# Patient Record
Sex: MALE | Race: WHITE | NOT HISPANIC OR LATINO | URBAN - METROPOLITAN AREA
[De-identification: names, ages, dates, MRNs, and addresses within clinical notes are randomized per-mention and may not be internally consistent; named-entity substitution may affect disease eponyms.]

---

## 2018-01-15 ENCOUNTER — EMERGENCY (EMERGENCY)
Facility: HOSPITAL | Age: 48
LOS: 1 days | End: 2018-01-15

## 2018-01-15 DIAGNOSIS — F25.9 SCHIZOAFFECTIVE DISORDER, UNSPECIFIED: ICD-10-CM

## 2018-01-15 DIAGNOSIS — F32.9 MAJOR DEPRESSIVE DISORDER, SINGLE EPISODE, UNSPECIFIED: ICD-10-CM

## 2018-01-15 DIAGNOSIS — E11.9 TYPE 2 DIABETES MELLITUS WITHOUT COMPLICATIONS: ICD-10-CM

## 2018-06-04 ENCOUNTER — INPATIENT (INPATIENT)
Facility: HOSPITAL | Age: 48
LOS: 7 days | Discharge: HOME | End: 2018-06-12
Attending: PSYCHIATRY & NEUROLOGY | Admitting: PSYCHIATRY & NEUROLOGY

## 2018-06-04 VITALS
DIASTOLIC BLOOD PRESSURE: 84 MMHG | WEIGHT: 225.09 LBS | RESPIRATION RATE: 22 BRPM | OXYGEN SATURATION: 98 % | HEIGHT: 71 IN | TEMPERATURE: 98 F | SYSTOLIC BLOOD PRESSURE: 149 MMHG | HEART RATE: 89 BPM

## 2018-06-04 DIAGNOSIS — F22 DELUSIONAL DISORDERS: ICD-10-CM

## 2018-06-04 DIAGNOSIS — F33.9 MAJOR DEPRESSIVE DISORDER, RECURRENT, UNSPECIFIED: ICD-10-CM

## 2018-06-04 DIAGNOSIS — F95.9 TIC DISORDER, UNSPECIFIED: ICD-10-CM

## 2018-06-04 DIAGNOSIS — F84.0 AUTISTIC DISORDER: ICD-10-CM

## 2018-06-04 LAB
ALBUMIN SERPL ELPH-MCNC: 4.3 G/DL — SIGNIFICANT CHANGE UP (ref 3.5–5.2)
ALP SERPL-CCNC: 51 U/L — SIGNIFICANT CHANGE UP (ref 30–115)
ALT FLD-CCNC: 18 U/L — SIGNIFICANT CHANGE UP (ref 0–41)
ANION GAP SERPL CALC-SCNC: 14 MMOL/L — SIGNIFICANT CHANGE UP (ref 7–14)
APAP SERPL-MCNC: <5 UG/ML — LOW (ref 10–30)
AST SERPL-CCNC: 17 U/L — SIGNIFICANT CHANGE UP (ref 0–41)
BASOPHILS # BLD AUTO: 0.05 K/UL — SIGNIFICANT CHANGE UP (ref 0–0.2)
BASOPHILS NFR BLD AUTO: 0.5 % — SIGNIFICANT CHANGE UP (ref 0–1)
BILIRUB SERPL-MCNC: 0.5 MG/DL — SIGNIFICANT CHANGE UP (ref 0.2–1.2)
BUN SERPL-MCNC: 16 MG/DL — SIGNIFICANT CHANGE UP (ref 10–20)
CALCIUM SERPL-MCNC: 9.8 MG/DL — SIGNIFICANT CHANGE UP (ref 8.5–10.1)
CHLORIDE SERPL-SCNC: 104 MMOL/L — SIGNIFICANT CHANGE UP (ref 98–110)
CO2 SERPL-SCNC: 25 MMOL/L — SIGNIFICANT CHANGE UP (ref 17–32)
CREAT SERPL-MCNC: 1.2 MG/DL — SIGNIFICANT CHANGE UP (ref 0.7–1.5)
EOSINOPHIL # BLD AUTO: 0.14 K/UL — SIGNIFICANT CHANGE UP (ref 0–0.7)
EOSINOPHIL NFR BLD AUTO: 1.5 % — SIGNIFICANT CHANGE UP (ref 0–8)
ETHANOL SERPL-MCNC: <10 MG/DL — HIGH
GLUCOSE SERPL-MCNC: 94 MG/DL — SIGNIFICANT CHANGE UP (ref 70–99)
HCT VFR BLD CALC: 41.1 % — LOW (ref 42–52)
HGB BLD-MCNC: 14.4 G/DL — SIGNIFICANT CHANGE UP (ref 14–18)
IMM GRANULOCYTES NFR BLD AUTO: 0.4 % — HIGH (ref 0.1–0.3)
LYMPHOCYTES # BLD AUTO: 2.51 K/UL — SIGNIFICANT CHANGE UP (ref 1.2–3.4)
LYMPHOCYTES # BLD AUTO: 26.1 % — SIGNIFICANT CHANGE UP (ref 20.5–51.1)
MCHC RBC-ENTMCNC: 30.1 PG — SIGNIFICANT CHANGE UP (ref 27–31)
MCHC RBC-ENTMCNC: 35 G/DL — SIGNIFICANT CHANGE UP (ref 32–37)
MCV RBC AUTO: 86 FL — SIGNIFICANT CHANGE UP (ref 80–94)
MONOCYTES # BLD AUTO: 0.75 K/UL — HIGH (ref 0.1–0.6)
MONOCYTES NFR BLD AUTO: 7.8 % — SIGNIFICANT CHANGE UP (ref 1.7–9.3)
NEUTROPHILS # BLD AUTO: 6.11 K/UL — SIGNIFICANT CHANGE UP (ref 1.4–6.5)
NEUTROPHILS NFR BLD AUTO: 63.7 % — SIGNIFICANT CHANGE UP (ref 42.2–75.2)
PLATELET # BLD AUTO: 274 K/UL — SIGNIFICANT CHANGE UP (ref 130–400)
POTASSIUM SERPL-MCNC: 3.9 MMOL/L — SIGNIFICANT CHANGE UP (ref 3.5–5)
POTASSIUM SERPL-SCNC: 3.9 MMOL/L — SIGNIFICANT CHANGE UP (ref 3.5–5)
PROT SERPL-MCNC: 6.8 G/DL — SIGNIFICANT CHANGE UP (ref 6–8)
RBC # BLD: 4.78 M/UL — SIGNIFICANT CHANGE UP (ref 4.7–6.1)
RBC # FLD: 11.8 % — SIGNIFICANT CHANGE UP (ref 11.5–14.5)
SALICYLATES SERPL-MCNC: <0.3 MG/DL — LOW (ref 4–30)
SODIUM SERPL-SCNC: 143 MMOL/L — SIGNIFICANT CHANGE UP (ref 135–146)
WBC # BLD: 9.6 K/UL — SIGNIFICANT CHANGE UP (ref 4.8–10.8)
WBC # FLD AUTO: 9.6 K/UL — SIGNIFICANT CHANGE UP (ref 4.8–10.8)

## 2018-06-04 RX ORDER — LURASIDONE HYDROCHLORIDE 40 MG/1
60 TABLET ORAL DAILY
Qty: 0 | Refills: 0 | Status: DISCONTINUED | OUTPATIENT
Start: 2018-06-04 | End: 2018-06-05

## 2018-06-04 RX ORDER — SIMVASTATIN 20 MG/1
20 TABLET, FILM COATED ORAL AT BEDTIME
Qty: 0 | Refills: 0 | Status: DISCONTINUED | OUTPATIENT
Start: 2018-06-04 | End: 2018-06-12

## 2018-06-04 RX ORDER — FLUOXETINE HCL 10 MG
40 CAPSULE ORAL DAILY
Qty: 0 | Refills: 0 | Status: DISCONTINUED | OUTPATIENT
Start: 2018-06-04 | End: 2018-06-12

## 2018-06-04 RX ORDER — METFORMIN HYDROCHLORIDE 850 MG/1
1000 TABLET ORAL
Qty: 0 | Refills: 0 | Status: DISCONTINUED | OUTPATIENT
Start: 2018-06-04 | End: 2018-06-12

## 2018-06-04 RX ORDER — LAMOTRIGINE 25 MG/1
200 TABLET, ORALLY DISINTEGRATING ORAL EVERY 12 HOURS
Qty: 0 | Refills: 0 | Status: DISCONTINUED | OUTPATIENT
Start: 2018-06-04 | End: 2018-06-12

## 2018-06-04 RX ORDER — CLONAZEPAM 1 MG
2 TABLET ORAL EVERY 12 HOURS
Qty: 0 | Refills: 0 | Status: DISCONTINUED | OUTPATIENT
Start: 2018-06-04 | End: 2018-06-11

## 2018-06-04 RX ADMIN — Medication 1 MILLIGRAM(S): at 16:23

## 2018-06-04 RX ADMIN — LAMOTRIGINE 200 MILLIGRAM(S): 25 TABLET, ORALLY DISINTEGRATING ORAL at 22:40

## 2018-06-04 RX ADMIN — Medication 2 MILLIGRAM(S): at 22:42

## 2018-06-04 RX ADMIN — METFORMIN HYDROCHLORIDE 1000 MILLIGRAM(S): 850 TABLET ORAL at 22:41

## 2018-06-04 RX ADMIN — SIMVASTATIN 20 MILLIGRAM(S): 20 TABLET, FILM COATED ORAL at 22:42

## 2018-06-04 NOTE — ED PROVIDER NOTE - OBJECTIVE STATEMENT
47y M w PMH schizoaffective disorder w paranoia, HTN, HLD, schizophrenia presents with wife for paranoid behavior, worsening for the last few days. Pt states that he believes that people have been watching him. Saw a van down the street from him for a long time. Once it left, he went to the house that it was parked near, and asked why it was there. Homeowner said that a neighbor had a [employer] service call, and the pt called [employer] to confirm and went to the neighbor's house and rang the doorbell. Both 47y M w PMH schizoaffective disorder w paranoia, HTN, HLD, schizophrenia presents with wife for paranoid behavior, worsening for the last few days. Pt states that he believes that people have been watching him. Saw a van down the street from him for a long time. Once it left, he went to the house that it was parked near, and asked why it was there. Homeowner said that a neighbor had a [employer] service call, and the pt called [employer] to confirm and went to the neighbor's house and rang the doorbell. Both sources told the pt that there was no service call, so he get more paranoid.   Today, wife wanted to bring him to ED for evaluation, and wanted a police escort for assistance. Pt got very upset and hit himself in the face with fists approximately 20-30 times while the police were not comfortable intervening.  Pt states he has been having delusions. Aware of his paranoia. +suicidal ideation. No HI.

## 2018-06-04 NOTE — ED BEHAVIORAL HEALTH ASSESSMENT NOTE - DETAILS
2 daughters age 16 and 19 haldol, abilify, geodon "bad reaction", risperdal worked x 8 yrs then "lost effectiveness" maternal uncle suicided, also w schizophrenia, 2 sisters with "mental disorders" requiring IPP na

## 2018-06-04 NOTE — ED PROVIDER NOTE - ATTENDING CONTRIBUTION TO CARE
48 y/o M comes to the ER PMH of multiple psych conditions, paranoid personality and delusional disorder. Last one last Sunday. Pt sees a psychotherapist 2 x a week. Pt also states that he punched himself in the right cheek 30 times in a rage and is requesting an XR.    Patient has been having delusions and ringing on peoples doorbells and thinking parked cars are people up to no good.  Wife is concerned patient has had a psychotic break and she cannot manage him. Patient placed on a 1:1, labs sent.  Patient medically cleared and evaluated by Psych, who request admission to Psych.

## 2018-06-04 NOTE — ED BEHAVIORAL HEALTH ASSESSMENT NOTE - HPI (INCLUDE ILLNESS QUALITY, SEVERITY, DURATION, TIMING, CONTEXT, MODIFYING FACTORS, ASSOCIATED SIGNS AND SYMPTOMS)
56 yo MWM w ho past dx of paranoid personality do, schizoaffective disorder, delusional disorder presents with worsening of paranoid symptoms. Pt reports having been diagnosed with schizoaffective disorder in June 2007 after presenting with belief that he was being watched by neighbors for "government reasons" and being fired from job and pt mentioned email from boss telling him that he "feared for my safety and the safety of my employees and my emotional well being". Pt had a 10 day stay in [facility] 10 for psych reasons. Pt is compliant with treatment of seeing Dr Kulkarni every few weeks and is on fluoxetine 20 mg qd, lamotragine 200 mg po q12h, clonazepam 2 mg po q12h, latuda 60 mg po qd and has been on this regimen for several months with most recent change being an inc in benzo as he was orig rx 2 mg total for day but was taking 3-5 mg per day and dosage was inc and pt has been taking appropriately as rx since then. Pt has been experiencing decompensation in recent days with worry that he was being watched by cars, worrying that the government is watching him to find out if he is driving to give this evidence to prosecutors who would pass that on to a  which would get him in legal trouble. Pt has been on SSD for mental illness and at the time of SSD assessment, he reported difficulty with driving and since then periodically worries that he is being observed that he is able to drive well. In recent days, pt has been concerned about cars on his block. Yesterday, he saw a car parked in front of neighbors home and thought it might be from [employer], so called [employer] to find out if they had sent a car to [facility]. Pt then began ringing doorbells of neighbors to ask them about cars in area. He has also called 2 of his 4 friends to ask 3 questions "are you my friend? are you watching me? are you recording me?" repeatedly. He reports his friends denied this and wife took phone away before he could call a 3rd person. Pt reports sadness as one friend told him "I can't have you around my wife and kids anymore" and he has had this friend for 30 yrs. Pt has also felt that he is being recorded. 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 Pt waited in car for her during her sessions. At each session, he became increasingly worried about being watched and at the last session she attended, he was screaming while in car. Wife was going to take him to ER, but was worried about his agitation and so called [place] for [place]. Pt reports he didn't want an escort, so when they arrived, he locked himself within car and began hitting himself in the face repeatedly, pt estimates 30 times (pt does have bruised and swollen right cheek) and yelling "I don't want an escort". [place] did not intervene until EMS responded and then was able to start an IV and transport him to ER. Pt reported that while in car, he had urge to take a large quantity of clonazepam to hurt himself "my doctor told me if I take too much, it can stop my heart". Pt did not have any meds on him. Pt reports that currently he has "zero depression" but states this was his original diagnosis for SSD. Pt pleasant and cooperative, good historian but exam noted for flat tone of voice and pt repeating facts in a robotic fashion multiple times during assessment.

## 2018-06-04 NOTE — ED BEHAVIORAL HEALTH ASSESSMENT NOTE - RISK ASSESSMENT
Pt felt to be an acute risk to self/others, given thoughts of self harm, nonsuicidal self injurious behavior and active psychosis

## 2018-06-04 NOTE — PATIENT PROFILE BEHAVIORAL HEALTH - NSBHPSYTREAT_PSY_A_CORE
partial hospitalization/family history of psych problems/pt sees a therapist twice a week and has a private psychiatrist he sees. Pt has family with psych disorders and prior IPP admissions/private therapist

## 2018-06-04 NOTE — ED PROVIDER NOTE - NS ED ROS FT
Constitutional: No fever or chills. Normal appetite. No unintended weight loss.   Eyes: No vision changes.  ENT: No hearing changes. No ear pain. No sore throat.  Neck: No neck pain or stiffness.  Cardiovascular: No chest pain, palpitations, or edema.  Pulmonary: No cough or SOB. No hemoptysis.  Abdominal: No abdominal pain, nausea, vomiting, or diarrhea.   : No dysuria or frequency. No hematuria.   Neuro: No headache, syncope, or dizziness.  MS: No back pain. No calf pain/swelling.  Psych: +suicidal ideations

## 2018-06-04 NOTE — ED BEHAVIORAL HEALTH ASSESSMENT NOTE - SUMMARY
54 yo MWM w ho ishaant diagnoses, currently presenting with delusion of being watched by government for evidence that he is fraudulently receiving his SSD income which would result in legal issues for him. Pt currently with flat affect, tells history with robotic, repetitive fashion but otherwise cooperative. Pt agrees to IPP at this time with ER hold. Process of ER hold explained to pt and pt in agreement.

## 2018-06-04 NOTE — ED PROVIDER NOTE - CARE PLAN
Principal Discharge DX:	Paranoid schizophrenia  Secondary Diagnosis:	Aggressive behavior  Secondary Diagnosis:	Delusions

## 2018-06-04 NOTE — ED PROVIDER NOTE - PHYSICAL EXAMINATION
Constitutional: Well developed, well nourished. NAD. Good general hygiene  Head: R upper cheek swelling without bleeding or abrasions.   Eyes: PERRLA. EOMI without discomfort.   ENT: No nasal discharge. Mucous membranes moist.  Neck: Supple. Painless ROM.  Cardiovascular: Regular rhythm. Regular rate. Normal S1 and S2. No murmurs. 2+ pulses in all extremities.   Pulmonary: Normal respiratory rate and effort. Lungs clear to auscultation bilaterally. No wheezing, rales, or rhonchi. Bilateral, equal lung expansion.   Abdominal: Soft. Nondistended. Nontender. No rebound or guarding.   Extremities. Pelvis stable. No lower extremity edema.  Skin: No rashes.   Neuro: AAOx3. No focal neurological deficits.  Psych: Agitated. Pressured speech. Anxious.

## 2018-06-04 NOTE — PATIENT PROFILE BEHAVIORAL HEALTH - REASON FOR ADMISSION
pt realized he was having delusional thinking over the last several weeks and has been recently getting worse. Pt states he feels that the government is tapping his phone and listening to his conversations, car in the neighborhood have been following him. Pt admitted to ringing neighborhood doorbells questioning people about deliveries made to their homes. Pt sees a therapist twice a week and regularly sees a psychiatrist. Pt expressed these concerns to his therapist and psychiatrist. Pt is compliant with medications at home. Pt went to work with wife this morning and got increasingly paranoid throughout the course of the day. Wife wanted a police escort to take pt to ER. Pt did not want the police escort, locked himself in the care and punched himself 30 times in the head.

## 2018-06-04 NOTE — ED PROVIDER NOTE - MEDICAL DECISION MAKING DETAILS
I personally evaluated the patient. I reviewed the Resident’s or Physician Assistant’s note (as assigned above), and agree with the findings and plan except as documented in my note. 46yo M comes to the ER PMH of multiple psych conditions, paranoid personality and delusional disorder. Last one last Sunday. Pt sees a psychotherapist 2 x a week. Pt also states that he punched himself in the right cheek 30 times in a rage and is requesting an XR.

## 2018-06-05 DIAGNOSIS — E78.00 PURE HYPERCHOLESTEROLEMIA, UNSPECIFIED: ICD-10-CM

## 2018-06-05 DIAGNOSIS — Z98.890 OTHER SPECIFIED POSTPROCEDURAL STATES: Chronic | ICD-10-CM

## 2018-06-05 DIAGNOSIS — F39 UNSPECIFIED MOOD [AFFECTIVE] DISORDER: ICD-10-CM

## 2018-06-05 DIAGNOSIS — E11.9 TYPE 2 DIABETES MELLITUS WITHOUT COMPLICATIONS: ICD-10-CM

## 2018-06-05 RX ORDER — LURASIDONE HYDROCHLORIDE 40 MG/1
80 TABLET ORAL DAILY
Qty: 0 | Refills: 0 | Status: DISCONTINUED | OUTPATIENT
Start: 2018-06-05 | End: 2018-06-05

## 2018-06-05 RX ORDER — LURASIDONE HYDROCHLORIDE 40 MG/1
80 TABLET ORAL DAILY
Qty: 0 | Refills: 0 | Status: DISCONTINUED | OUTPATIENT
Start: 2018-06-06 | End: 2018-06-07

## 2018-06-05 RX ORDER — HYDROXYZINE HCL 10 MG
50 TABLET ORAL EVERY 6 HOURS
Qty: 0 | Refills: 0 | Status: DISCONTINUED | OUTPATIENT
Start: 2018-06-05 | End: 2018-06-12

## 2018-06-05 RX ADMIN — Medication 40 MILLIGRAM(S): at 11:12

## 2018-06-05 RX ADMIN — LURASIDONE HYDROCHLORIDE 60 MILLIGRAM(S): 40 TABLET ORAL at 11:12

## 2018-06-05 RX ADMIN — LAMOTRIGINE 200 MILLIGRAM(S): 25 TABLET, ORALLY DISINTEGRATING ORAL at 08:33

## 2018-06-05 RX ADMIN — Medication 2 MILLIGRAM(S): at 20:59

## 2018-06-05 RX ADMIN — Medication 2 MILLIGRAM(S): at 08:32

## 2018-06-05 RX ADMIN — METFORMIN HYDROCHLORIDE 1000 MILLIGRAM(S): 850 TABLET ORAL at 08:33

## 2018-06-05 RX ADMIN — METFORMIN HYDROCHLORIDE 1000 MILLIGRAM(S): 850 TABLET ORAL at 20:44

## 2018-06-05 RX ADMIN — SIMVASTATIN 20 MILLIGRAM(S): 20 TABLET, FILM COATED ORAL at 20:44

## 2018-06-05 RX ADMIN — LAMOTRIGINE 200 MILLIGRAM(S): 25 TABLET, ORALLY DISINTEGRATING ORAL at 20:44

## 2018-06-05 NOTE — PROGRESS NOTE ADULT - SUBJECTIVE AND OBJECTIVE BOX
Pt was seen by Dr. Godwin (PGY2)- assessment and plan discussed with resident and in agreement.    Pt was evaluated and assessed independently.     States he came in because of worsening paranoia. Reports that he had thoughts of overdosing on his klonopin yesterday which scared him and caused him to ask his wife to take him to the ER. It appears the thought was more intrusive then a suicidal ideation with intent. States he has been seeing his therapist (2 times per week- Dr. Meza) and Dr. Kulkarni as scheduled. Most recently on Monday his klonopin was change to 2mg q12 from 1mg q6. States his Latuda was last increased 1 month ago.   His Prozac was increased on admission. Pt states he feels better now that he is in the hospital and denies any active suicidal ideation. States he continues to believe the government is following him and has relief with reassurance but states that after therapy his symptoms usually subside for 1-3 days but then return.

## 2018-06-05 NOTE — H&P ADULT - NSHPPHYSICALEXAM_GEN_ALL_CORE
Vital Signs Last 24 Hrs  T(F): 97.6 (05 Jun 2018 09:45), Max: 98.6 (04 Jun 2018 18:43)  HR: 67 (05 Jun 2018 09:45) (63 - 89)  BP: 135/64 (05 Jun 2018 09:45) (119/65 - 149/84)  RR: 20 (05 Jun 2018 10:30) (16 - 22)  SpO2: 96% (05 Jun 2018 10:30) (96% - 99%)    PHYSICAL EXAM:      Constitutional: alert, calm, speech slightly rapid and pressured  Eyes: PERRLA, EOM intact  ENMT: no sore throat  Neck: no tenderness  Back: no spinal tenderness  Respiratory: cta b/l  Cardiovascular: s1 s2 rrr  Gastrointestinal: soft nt  nd + bs no rebound or guarding  Genitourinary: no cva tenderness  Extremities: normal rom, no edema, calf tenderness  Vascular: no cyanosis   Neurological:no focal deficits  Skin: no rash  Lymph Nodes: no lymphadenopathy  Musculoskeletal: no joint swelling

## 2018-06-05 NOTE — H&P ADULT - HISTORY OF PRESENT ILLNESS
46yo M with hx of mental illness compliant with psych meds whom was having increasing, paranoia, delusions at home and became increasingly agitated, screaming frequently, thoughts of hurting himself and even SI. Patient punched himself in head 30 times in a rage episode. Patient brought to ED by police.  Patient denies any alcohol or substance abuse.

## 2018-06-05 NOTE — PROGRESS NOTE ADULT - PROBLEM SELECTOR PLAN 1
-Pt will be changed to enhanced supervision for safety and 1:1 will be discontinued  -Will increase Latuda to 80mg q24  -Will follow up with Dr. Kulkarni and wife for collateral  -Ativan 1mg q6 prn for severe agitation  -Vistaril 50mg q6 prn for anxiety or agitation

## 2018-06-05 NOTE — CHART NOTE - NSCHARTNOTEFT_GEN_A_CORE
Social work admit note:    PT is a 55 year old  white male brought to the ED by [place]/[place] for mental health evaluation due to paranoia, agitation, self-harm behavior and anxiety.      PT has a previous diagnosis of paranoid personality disorder, schizoaffective disorder, delusional disorder presents with worsening of paranoid symptoms. Per ED notes. PT reports having been diagnosed with schizoaffective disorder in June 2007 after presenting with belief that he was being watched by neighbors for "government reasons" and being fired from job and pt mentioned email from boss telling him that he "feared for my safety and the safety of my employees and my emotional well being". PT has been experiencing decompensation in recent days with worry that he was being watched by cars, worrying that the government is watching. Last admission @ [facility] IPP1/2019 with similar presentation.    PT currently receiving out patient treatment bu Dr. Kulkarni, [facility]. PT is currently receiving SSDI, he lives with his wife and minor children.    PT denies any substance abuse or legal issues. See social work assessment for further information.

## 2018-06-05 NOTE — H&P ADULT - NSHPLABSRESULTS_GEN_ALL_CORE
14.4   9.60  )-----------( 274      ( 04 Jun 2018 17:14 )             41.1   06-04    143  |  104  |  16  ----------------------------<  94  3.9   |  25  |  1.2    Ca    9.8      04 Jun 2018 17:14    TPro  6.8  /  Alb  4.3  /  TBili  0.5  /  DBili  x   /  AST  17  /  ALT  18  /  AlkPhos  51  06-04    < from: CT Maxillofacial No Cont (06.04.18 @ 20:03) >    IMPRESSION:    No evidence of acute facial fracture.    < end of copied text >

## 2018-06-05 NOTE — H&P ADULT - NSHPREVIEWOFSYSTEMS_GEN_ALL_CORE
General:	no fever, weight loss,  chills  Skin: no rash, ulcers  Ophthalmologic: no visual changes  ENMT:	no sore throat  Respiratory and Thorax: no cough, wheeze,  sob  Cardiovascular:	no chest pain, palpitations, dizziness  Gastrointestinal:	no nausea, vomiting, diarrhea, abd pain  Genitourinary:	no dysuria, hematuria  Musculoskeletal:	no joint pains  Neurological:	 no speech disturbance, focal weakness, numbness

## 2018-06-06 DIAGNOSIS — E11.9 TYPE 2 DIABETES MELLITUS WITHOUT COMPLICATIONS: ICD-10-CM

## 2018-06-06 DIAGNOSIS — F25.1 SCHIZOAFFECTIVE DISORDER, DEPRESSIVE TYPE: ICD-10-CM

## 2018-06-06 LAB
CHOLEST SERPL-MCNC: 125 MG/DL — SIGNIFICANT CHANGE UP (ref 100–200)
ESTIMATED AVERAGE GLUCOSE: 126 MG/DL — HIGH (ref 68–114)
HBA1C BLD-MCNC: 6 % — HIGH (ref 4–5.6)
HDLC SERPL-MCNC: 39 MG/DL — LOW (ref 40–125)
LIPID PNL WITH DIRECT LDL SERPL: 66 MG/DL — SIGNIFICANT CHANGE UP (ref 4–129)
TOTAL CHOLESTEROL/HDL RATIO MEASUREMENT: 3.2 RATIO — LOW (ref 4–5.5)
TRIGL SERPL-MCNC: 162 MG/DL — HIGH (ref 10–149)

## 2018-06-06 RX ADMIN — Medication 1 MILLIGRAM(S): at 03:34

## 2018-06-06 RX ADMIN — Medication 50 MILLIGRAM(S): at 04:28

## 2018-06-06 RX ADMIN — METFORMIN HYDROCHLORIDE 1000 MILLIGRAM(S): 850 TABLET ORAL at 20:01

## 2018-06-06 RX ADMIN — LAMOTRIGINE 200 MILLIGRAM(S): 25 TABLET, ORALLY DISINTEGRATING ORAL at 08:33

## 2018-06-06 RX ADMIN — LURASIDONE HYDROCHLORIDE 80 MILLIGRAM(S): 40 TABLET ORAL at 08:34

## 2018-06-06 RX ADMIN — Medication 40 MILLIGRAM(S): at 08:33

## 2018-06-06 RX ADMIN — LAMOTRIGINE 200 MILLIGRAM(S): 25 TABLET, ORALLY DISINTEGRATING ORAL at 20:01

## 2018-06-06 RX ADMIN — METFORMIN HYDROCHLORIDE 1000 MILLIGRAM(S): 850 TABLET ORAL at 08:33

## 2018-06-06 RX ADMIN — Medication 2 MILLIGRAM(S): at 08:33

## 2018-06-06 RX ADMIN — Medication 2 MILLIGRAM(S): at 20:01

## 2018-06-06 RX ADMIN — SIMVASTATIN 20 MILLIGRAM(S): 20 TABLET, FILM COATED ORAL at 20:01

## 2018-06-06 NOTE — PROGRESS NOTE BEHAVIORAL HEALTH - NSBHCHARTREVIEWLAB_PSY_A_CORE FT
CBC Full  -  ( 04 Jun 2018 17:14 )  WBC Count : 9.60 K/uL  Hemoglobin : 14.4 g/dL  Hematocrit : 41.1 %  Platelet Count - Automated : 274 K/uL  Mean Cell Volume : 86.0 fL  Mean Cell Hemoglobin : 30.1 pg  Mean Cell Hemoglobin Concentration : 35.0 g/dL  Auto Neutrophil # : 6.11 K/uL  Auto Lymphocyte # : 2.51 K/uL  Auto Monocyte # : 0.75 K/uL  Auto Eosinophil # : 0.14 K/uL  Auto Basophil # : 0.05 K/uL  Auto Neutrophil % : 63.7 %  Auto Lymphocyte % : 26.1 %  Auto Monocyte % : 7.8 %  Auto Eosinophil % : 1.5 %  Auto Basophil % : 0.5 %
CBC Full  -  ( 04 Jun 2018 17:14 )  WBC Count : 9.60 K/uL  Hemoglobin : 14.4 g/dL  Hematocrit : 41.1 %  Platelet Count - Automated : 274 K/uL  Mean Cell Volume : 86.0 fL  Mean Cell Hemoglobin : 30.1 pg  Mean Cell Hemoglobin Concentration : 35.0 g/dL  Auto Neutrophil # : 6.11 K/uL  Auto Lymphocyte # : 2.51 K/uL  Auto Monocyte # : 0.75 K/uL  Auto Eosinophil # : 0.14 K/uL  Auto Basophil # : 0.05 K/uL  Auto Neutrophil % : 63.7 %  Auto Lymphocyte % : 26.1 %  Auto Monocyte % : 7.8 %  Auto Eosinophil % : 1.5 %  Auto Basophil % : 0.5 %

## 2018-06-06 NOTE — CONSULT NOTE ADULT - SUBJECTIVE AND OBJECTIVE BOX
QUINN CAMPOS  47y  Male      Patient is a 47y old  Male who presents with a chief complaint of pt realized he was having delusional thinking over the last several weeks and has been recently getting worse. Pt states he feels that the government is tapping his phone and listening to his conversations, car in the neighborhood have been following him. Pt admitted to ringing neighborhood doorbells questioning people about deliveries made to their homes. Pt sees a therapist twice a week and regularly sees a psychiatrist. Pt expressed these concerns to his therapist and psychiatrist. Pt is compliant with medications at home. Pt went to work with wife this morning and got increasingly paranoid throughout the course of the day. Wife wanted a police escort to take pt to ER. Pt did not want the police escort, locked himself in the care and punched himself 30 times in the head. (04 Jun 2018 23:36)    HPI:  48yo M with hx of mental illness compliant with psych meds whom was having increasing, paranoia, delusions at home and became increasingly agitated, screaming frequently, thoughts of hurting himself and even SI. Patient punched himself in head 30 times in a rage episode. Patient brought to ED by police.  Patient denies any alcohol or substance abuse. (05 Jun 2018 10:46)    pt wellknown to me as take care of himmecially in office  last seen few months ago  lives hjoem with wife adn children    INTERVAL HPI/OVERNIGHT EVENTS:  HEALTH ISSUES - PROBLEM Dx:  Mood disorder: Mood disorder  Type 2 diabetes mellitus without complication, without long-term current use of insulin: Type 2 diabetes mellitus without complication, without long-term current use of insulin  High blood cholesterol: High blood cholesterol  Delusional disorder  Major depression, recurrent, chronic  Autism spectrum disorder  Tic like phenomenon        PAST MEDICAL & SURGICAL HISTORY:  Paranoid schizophrenia  High blood cholesterol  Diabetes mellitus, type 2  S/P knee surgery    FAMILY HISTORY:  No pertinent family history in first degree relatives    clonazePAM Tablet 2 milliGRAM(s) Oral every 12 hours  FLUoxetine 40 milliGRAM(s) Oral daily  hydrOXYzine hydrochloride 50 milliGRAM(s) Oral every 6 hours PRN  lamoTRIgine 200 milliGRAM(s) Oral every 12 hours  LORazepam     Tablet 1 milliGRAM(s) Oral every 6 hours PRN  lurasidone 80 milliGRAM(s) Oral daily  metFORMIN 1000 milliGRAM(s) Oral two times a day  simvastatin 20 milliGRAM(s) Oral at bedtime      REVIEW OF SYSTEMS:  CONSTITUTIONAL: No fever, weight loss, or fatigue  EYES: No eye pain, visual disturbances, or discharge  ENMT:  No difficulty hearing, tinnitus, vertigo; No sinus or throat pain  NECK: No pain or stiffness  BREASTS: No pain, masses, or nipple discharge  RESPIRATORY: No cough, wheezing, chills or hemoptysis; No shortness of breath  CARDIOVASCULAR: No chest pain, palpitations, dizziness, or leg swelling  GASTROINTESTINAL: No abdominal or epigastric pain. No nausea, vomiting, or hematemesis; No diarrhea or constipation. No melena or hematochezia.  GENITOURINARY: No dysuria, frequency, hematuria, or incontinence  NEUROLOGICAL: No headaches, memory loss, loss of strength, numbness, or tremors  SKIN: No itching, burning, rashes, or lesions   LYMPH NODES: No enlarged glands  ENDOCRINE: No heat or cold intolerance; No hair loss  MUSCULOSKELETAL: No joint pain or swelling; No muscle, back, or extremity pain  PSYCHIATRIC: as per hpi and previous psych history  HEME/LYMPH: No easy bruising, or bleeding gums  ALLERY AND IMMUNOLOGIC: No hives or eczema    T(C): 36.3 (06-06-18 @ 06:20), Max: 36.4 (06-05-18 @ 09:45)  HR: 65 (06-06-18 @ 06:20) (65 - 80)  BP: 108/63 (06-06-18 @ 06:20) (108/63 - 135/64)  RR: 16 (06-06-18 @ 06:20) (16 - 20)  SpO2: 96% (06-05-18 @ 10:30) (96% - 99%)  Wt(kg): --Vital Signs Last 24 Hrs  T(C): 36.3 (06 Jun 2018 06:20), Max: 36.4 (05 Jun 2018 09:45)  T(F): 97.3 (06 Jun 2018 06:20), Max: 97.6 (05 Jun 2018 09:45)  HR: 65 (06 Jun 2018 06:20) (65 - 80)  BP: 108/63 (06 Jun 2018 06:20) (108/63 - 135/64)  BP(mean): --  RR: 16 (06 Jun 2018 06:20) (16 - 20)  SpO2: 96% (05 Jun 2018 10:30) (96% - 99%)    PHYSICAL EXAM:  GENERAL: NAD, well-groomed, well-developed  HEAD:  Atraumatic, Normocephalic  EYES: EOMI, PERRLA, conjunctiva and sclera clear  ENMT: No tonsillar erythema, exudates, or enlargement; Moist mucous membranes, Good dentition, No lesions  NECK: Supple, No JVD, Normal thyroid  NERVOUS SYSTEM:  Alert & Oriented X3, Good concentration; Motor Strength 5/5 B/L upper and lower extremities; DTRs 2+ intact and symmetric  CHEST/LUNG: Clear to percussion bilaterally; No rales, rhonchi, wheezing, or rubs  HEART: Regular rate and rhythm; No murmurs, rubs, or gallops  ABDOMEN: Soft, Nontender, Nondistended; Bowel sounds present  EXTREMITIES:  2+ Peripheral Pulses, No clubbing, cyanosis, or edema  LYMPH: No lymphadenopathy noted  SKIN: No rashes or lesions  Neuro: alert  no focal deficits    Consultant(s) Notes Reviewed:  [x ] YES  [ ] NO  Care Discussed with Consultants/Other Providers [ x] YES  [ ] NO    LABS:                        14.4   9.60  )-----------( 274      ( 04 Jun 2018 17:14 )             41.1     06-04    143  |  104  |  16  ----------------------------<  94  3.9   |  25  |  1.2    Ca    9.8      04 Jun 2018 17:14    TPro  6.8  /  Alb  4.3  /  TBili  0.5  /  DBili  x   /  AST  17  /  ALT  18  /  AlkPhos  51  06-04        CAPILLARY BLOOD GLUCOSE                RADIOLOGY & ADDITIONAL TESTS:    Imaging Personally Reviewed:  [ ] YES  [ ] NO

## 2018-06-07 DIAGNOSIS — F20.9 SCHIZOPHRENIA, UNSPECIFIED: ICD-10-CM

## 2018-06-07 LAB — TSH SERPL-MCNC: 1.83 UIU/ML — SIGNIFICANT CHANGE UP (ref 0.27–4.2)

## 2018-06-07 RX ORDER — LURASIDONE HYDROCHLORIDE 40 MG/1
80 TABLET ORAL DAILY
Qty: 0 | Refills: 0 | Status: DISCONTINUED | OUTPATIENT
Start: 2018-06-07 | End: 2018-06-12

## 2018-06-07 RX ADMIN — METFORMIN HYDROCHLORIDE 1000 MILLIGRAM(S): 850 TABLET ORAL at 08:03

## 2018-06-07 RX ADMIN — Medication 40 MILLIGRAM(S): at 08:02

## 2018-06-07 RX ADMIN — LAMOTRIGINE 200 MILLIGRAM(S): 25 TABLET, ORALLY DISINTEGRATING ORAL at 20:49

## 2018-06-07 RX ADMIN — Medication 2 MILLIGRAM(S): at 20:49

## 2018-06-07 RX ADMIN — SIMVASTATIN 20 MILLIGRAM(S): 20 TABLET, FILM COATED ORAL at 21:05

## 2018-06-07 RX ADMIN — Medication 2 MILLIGRAM(S): at 08:02

## 2018-06-07 RX ADMIN — Medication 50 MILLIGRAM(S): at 04:02

## 2018-06-07 RX ADMIN — METFORMIN HYDROCHLORIDE 1000 MILLIGRAM(S): 850 TABLET ORAL at 20:49

## 2018-06-07 RX ADMIN — LAMOTRIGINE 200 MILLIGRAM(S): 25 TABLET, ORALLY DISINTEGRATING ORAL at 08:03

## 2018-06-07 RX ADMIN — LURASIDONE HYDROCHLORIDE 80 MILLIGRAM(S): 40 TABLET ORAL at 08:03

## 2018-06-07 NOTE — PROGRESS NOTE ADULT - ASSESSMENT
Pt is 47 y.o. male with schizoaffective disorder presented to hospital with worsening paranoid delusions, worsening depression with suicidal ideation, and decreased self care. Pt is stable on the unit, denying active suicidal ideation at this time. Pt is tolerating increase in Latuda and Prozac. Will consider decreasing klonopin dose.     -Will increase Latuda to 100mg q24 tomorrow  -Continue Lamictal 200mg q12  -Continue Prozac 40mg q24  -Continue Klonopin 2mg q12 at this time  -Will discontinue ativan prn as pt is benefiting from vistaril prn  -Continue Vistaril 50mg q 6 prn Pt is 47 y.o. male with schizoaffective disorder presented to hospital with worsening paranoid delusions, worsening depression with suicidal ideation, and decreased self care. Pt is stable on the unit, denying active suicidal ideation at this time. Pt is tolerating increase in Latuda and Prozac. Will consider decreasing klonopin dose.

## 2018-06-07 NOTE — PROGRESS NOTE ADULT - PROBLEM SELECTOR PLAN 1
-Will increase Latuda to 100mg q24 tomorrow  -Continue Lamictal 200mg q12  -Continue Prozac 40mg q24  -Continue Klonopin 2mg q12 at this time  -Will discontinue ativan prn as pt is benefiting from vistaril prn  -Continue Vistaril 50mg q 6 prn -Continue Latuda 80mg q24  -Continue Lamictal 200mg q12  -Continue Prozac 40mg q24  -Continue Klonopin 2mg q12 at this time  -Will discontinue ativan prn as pt is benefiting from vistaril prn  -Continue Vistaril 50mg q 6 prn

## 2018-06-07 NOTE — PROGRESS NOTE ADULT - SUBJECTIVE AND OBJECTIVE BOX
Pt seen and examined independent of PGY2 Dr. Godwin    Pt reported he is doing "very good" and states he did wake up at 4 am but was able to get back to sleep with a vistaril.  States he feels his paranoid thoughts are getting better and attributes this to Latuda. Denies any side effects . States his wife and in-laws visiting helped his mood.   Pt is interested in attending groups while on the unit.  Discussed Clozaril risk and benefits with patient and pt feels he is improving with increase in Latuda and would like to give it a trail.   Pt was asking about individual therapy while on the unit despite being told yesterday that is not available.    On assessment pt is alert and awake, orientated x 3. Pt is well groomed and has fair eye contact. Pt is cooperative during assessment. Speech is reg rate, rhythm, volume, tone. Pts mood is "better", affect is anxious and constricted. Thought process is linear but overinclusive and perseverating. + Paranoid Delusions. Denies SI/HI/AVH. Insight is fair, judgement is good.    Pt is 47 y.o. male with schizoaffective disorder presented to hospital with worsening paranoid delusions, worsening depression with suicidal ideation, and decreased self care. Pt is stable on the unit, denying active suicidal ideation at this time. Pt is tolerating increase in Latuda and Prozac. Will consider decreasing klonopin dose.     -Will increase Latuda to 100mg q24 tomorrow  -Continue Lamictal 200mg q12  -Continue Prozac 40mg q24  -Continue Klonopin 2mg q12 Pt seen and examined independent of PGY2 Dr. Godwin    Pt reported he is doing "very good" and states he did wake up at 4 am but was able to get back to sleep with a vistaril.  States he feels his paranoid thoughts are getting better and attributes this to Latuda. Denies any side effects . States his wife and in-laws visiting helped his mood.   Pt is interested in attending groups while on the unit.  Discussed Clozaril risk and benefits with patient and pt feels he is improving with increase in Latuda and would like to give it a trail.   Pt was asking about individual therapy while on the unit despite being told yesterday that is not available.    On assessment pt is alert and awake, orientated x 3. Pt is well groomed and has fair eye contact. Pt is cooperative during assessment. Speech is reg rate, rhythm, volume, tone. Pts mood is "better", affect is anxious and constricted. Thought process is linear but overinclusive and perseverating. + Paranoid Delusions. Denies SI/HI/AVH. Insight is fair, judgement is good.

## 2018-06-07 NOTE — PROGRESS NOTE BEHAVIORAL HEALTH - NSBHADDHXPSYCHFT_PSY_A_CORE
patient reports that he had thoughts that his emails were being hacked, and his bosses firing him, however he was coping at work. In 2007, he decompensated and had to stop working

## 2018-06-08 RX ADMIN — LAMOTRIGINE 200 MILLIGRAM(S): 25 TABLET, ORALLY DISINTEGRATING ORAL at 20:37

## 2018-06-08 RX ADMIN — LURASIDONE HYDROCHLORIDE 80 MILLIGRAM(S): 40 TABLET ORAL at 08:06

## 2018-06-08 RX ADMIN — Medication 40 MILLIGRAM(S): at 12:15

## 2018-06-08 RX ADMIN — METFORMIN HYDROCHLORIDE 1000 MILLIGRAM(S): 850 TABLET ORAL at 08:05

## 2018-06-08 RX ADMIN — LAMOTRIGINE 200 MILLIGRAM(S): 25 TABLET, ORALLY DISINTEGRATING ORAL at 08:32

## 2018-06-08 RX ADMIN — METFORMIN HYDROCHLORIDE 1000 MILLIGRAM(S): 850 TABLET ORAL at 20:37

## 2018-06-08 RX ADMIN — Medication 2 MILLIGRAM(S): at 08:03

## 2018-06-08 RX ADMIN — Medication 50 MILLIGRAM(S): at 18:13

## 2018-06-08 RX ADMIN — SIMVASTATIN 20 MILLIGRAM(S): 20 TABLET, FILM COATED ORAL at 21:09

## 2018-06-08 RX ADMIN — Medication 2 MILLIGRAM(S): at 20:38

## 2018-06-08 NOTE — PROGRESS NOTE BEHAVIORAL HEALTH - SUMMARY
47 Y male with schizoaffective disorder admitted for decompensated psychosis and poor self care. Since pt's Prozac and Latuda were increased, pt has improved grooming, reports decreased paranoia, and visible on the unit more, and less anxious.   Family meeting on Monday at 2pm
47 Y male with schizoaffective disorder admitted for decompensated psychosis. Patient presently has paranoid ideations which interfere with his functioning and worsening mood presenting suicidal ideation.  Pt will be tried on increase in Latuda and Prozac. If pts symptoms continue to not respond, will consider Clozaril after discussing risk and benefits with patient.
47 Y male with schizoaffective disorder admitted for decompensated psychosis. Patient presently has paranoid ideations which interfere with his functioning and make him a danger to self, requiring IPP hospitalization.    Plan:  1. discontinue 1:1  2. increase latuda to 80mg  3. prn ativan 1mg and vistaril 50 for agitation
47 Y male with schizoaffective disorder admitted for decompensated psychosis. Patient presently has paranoid ideations which interfere with his functioning and worsening mood presenting suicidal ideation.  Pt will be tried on increase in Latuda and Prozac. If pts symptoms continue to not respond, will consider Clozaril after discussing risk and benefits with patient.

## 2018-06-09 RX ADMIN — METFORMIN HYDROCHLORIDE 1000 MILLIGRAM(S): 850 TABLET ORAL at 08:11

## 2018-06-09 RX ADMIN — SIMVASTATIN 20 MILLIGRAM(S): 20 TABLET, FILM COATED ORAL at 20:37

## 2018-06-09 RX ADMIN — LURASIDONE HYDROCHLORIDE 80 MILLIGRAM(S): 40 TABLET ORAL at 08:13

## 2018-06-09 RX ADMIN — Medication 2 MILLIGRAM(S): at 08:14

## 2018-06-09 RX ADMIN — Medication 50 MILLIGRAM(S): at 20:34

## 2018-06-09 RX ADMIN — Medication 2 MILLIGRAM(S): at 20:33

## 2018-06-09 RX ADMIN — Medication 50 MILLIGRAM(S): at 02:30

## 2018-06-09 RX ADMIN — Medication 40 MILLIGRAM(S): at 11:51

## 2018-06-09 RX ADMIN — METFORMIN HYDROCHLORIDE 1000 MILLIGRAM(S): 850 TABLET ORAL at 20:34

## 2018-06-09 RX ADMIN — LAMOTRIGINE 200 MILLIGRAM(S): 25 TABLET, ORALLY DISINTEGRATING ORAL at 20:34

## 2018-06-09 RX ADMIN — LAMOTRIGINE 200 MILLIGRAM(S): 25 TABLET, ORALLY DISINTEGRATING ORAL at 08:11

## 2018-06-09 NOTE — PROGRESS NOTE ADULT - SUBJECTIVE AND OBJECTIVE BOX
pt stable alert in NAD  no new complaints   stil with paranoina    PARANOID SCHIZOPHRENIA; AGGRESSIVE BEHAVIOR; DELUSIONS  ^PARANOID SCHIZOPHRENIA; AGGRESSIVE BEHAVIOR; DELUSIONS  No pertinent family history in first degree relatives  MEWS Score  Paranoid schizophrenia  High blood cholesterol  Diabetes mellitus, type 2  Paranoid schizophrenia  Schizophrenia, unspecified type  Diabetes mellitus, type 2  Schizoaffective disorder, depressive type  Mood disorder  Type 2 diabetes mellitus without complication, without long-term current use of insulin  High blood cholesterol  Delusional disorder  Major depression, recurrent, chronic  Autism spectrum disorder  Tic like phenomenon  S/P knee surgery  No significant past surgical history  AGITATED  90+  Delusions  Aggressive behavior    HEALTH ISSUES - PROBLEM Dx:  Schizophrenia, unspecified type  Diabetes mellitus, type 2: Diabetes mellitus, type 2  Schizoaffective disorder, depressive type: Schizoaffective disorder, depressive type  Mood disorder: Mood disorder  Type 2 diabetes mellitus without complication, without long-term current use of insulin: Type 2 diabetes mellitus without complication, without long-term current use of insulin  High blood cholesterol: High blood cholesterol  Delusional disorder  Major depression, recurrent, chronic  Autism spectrum disorder  Tic like phenomenon        PAST MEDICAL & SURGICAL HISTORY:  Paranoid schizophrenia  High blood cholesterol  Diabetes mellitus, type 2  S/P knee surgery    No Known Allergies      FAMILY HISTORY:  No pertinent family history in first degree relatives      clonazePAM Tablet 2 milliGRAM(s) Oral every 12 hours  FLUoxetine 40 milliGRAM(s) Oral daily  hydrOXYzine hydrochloride 50 milliGRAM(s) Oral every 6 hours PRN  lamoTRIgine 200 milliGRAM(s) Oral every 12 hours  lurasidone 80 milliGRAM(s) Oral daily  metFORMIN 1000 milliGRAM(s) Oral two times a day  simvastatin 20 milliGRAM(s) Oral at bedtime      T(C): 35.9 (06-09-18 @ 06:29), Max: 36.3 (06-08-18 @ 11:05)  HR: 68 (06-09-18 @ 06:29) (68 - 89)  BP: 112/64 (06-09-18 @ 06:29) (112/64 - 120/79)  RR: 16 (06-09-18 @ 06:29) (16 - 20)  SpO2: --    PE;  general:   stable no acute cahnages  Lungs:    Heart:    EXT:  from  Neuro:alert no deficits                          CAPILLARY BLOOD GLUCOSE

## 2018-06-10 RX ADMIN — Medication 50 MILLIGRAM(S): at 18:10

## 2018-06-10 RX ADMIN — LURASIDONE HYDROCHLORIDE 80 MILLIGRAM(S): 40 TABLET ORAL at 08:02

## 2018-06-10 RX ADMIN — SIMVASTATIN 20 MILLIGRAM(S): 20 TABLET, FILM COATED ORAL at 21:18

## 2018-06-10 RX ADMIN — Medication 2 MILLIGRAM(S): at 08:01

## 2018-06-10 RX ADMIN — Medication 40 MILLIGRAM(S): at 11:56

## 2018-06-10 RX ADMIN — LAMOTRIGINE 200 MILLIGRAM(S): 25 TABLET, ORALLY DISINTEGRATING ORAL at 08:01

## 2018-06-10 RX ADMIN — LAMOTRIGINE 200 MILLIGRAM(S): 25 TABLET, ORALLY DISINTEGRATING ORAL at 20:24

## 2018-06-10 RX ADMIN — Medication 2 MILLIGRAM(S): at 20:24

## 2018-06-10 RX ADMIN — METFORMIN HYDROCHLORIDE 1000 MILLIGRAM(S): 850 TABLET ORAL at 08:01

## 2018-06-10 RX ADMIN — METFORMIN HYDROCHLORIDE 1000 MILLIGRAM(S): 850 TABLET ORAL at 20:24

## 2018-06-10 NOTE — PROGRESS NOTE BEHAVIORAL HEALTH - NSBHCHARTREVIEWVS_PSY_A_CORE FT
Vital Signs Last 24 Hrs  T(C): 35.9 (07 Jun 2018 10:04), Max: 36.7 (06 Jun 2018 18:31)  T(F): 96.7 (07 Jun 2018 10:04), Max: 98 (06 Jun 2018 18:31)  HR: 101 (07 Jun 2018 10:04) (63 - 101)  BP: 108/72 (07 Jun 2018 10:04) (108/72 - 123/59)  BP(mean): --  RR: 17 (07 Jun 2018 10:04) (16 - 18)  SpO2: --
Vital Signs Last 24 Hrs  T(C): 35.5 (05 Jun 2018 18:44), Max: 36.6 (05 Jun 2018 07:22)  T(F): 95.9 (05 Jun 2018 18:44), Max: 97.8 (05 Jun 2018 07:22)  HR: 80 (05 Jun 2018 18:44) (63 - 80)  BP: 132/77 (05 Jun 2018 18:44) (119/65 - 135/64)  BP(mean): --  RR: 18 (05 Jun 2018 18:44) (16 - 20)  SpO2: 96% (05 Jun 2018 10:30) (96% - 99%)
Vital Signs Last 24 Hrs  T(C): 35.9 (07 Jun 2018 10:04), Max: 36.7 (06 Jun 2018 18:31)  T(F): 96.7 (07 Jun 2018 10:04), Max: 98 (06 Jun 2018 18:31)  HR: 101 (07 Jun 2018 10:04) (63 - 101)  BP: 108/72 (07 Jun 2018 10:04) (108/72 - 123/59)  BP(mean): --  RR: 17 (07 Jun 2018 10:04) (16 - 18)  SpO2: --
Vital Signs Last 24 Hrs  T(C): 35.5 (05 Jun 2018 18:44), Max: 36.6 (05 Jun 2018 07:22)  T(F): 95.9 (05 Jun 2018 18:44), Max: 97.8 (05 Jun 2018 07:22)  HR: 80 (05 Jun 2018 18:44) (63 - 80)  BP: 132/77 (05 Jun 2018 18:44) (119/65 - 135/64)  BP(mean): --  RR: 18 (05 Jun 2018 18:44) (16 - 20)  SpO2: 96% (05 Jun 2018 10:30) (96% - 99%)

## 2018-06-10 NOTE — PROGRESS NOTE BEHAVIORAL HEALTH - PROBLEM SELECTOR PLAN 2
Carb consistent diet  Metformin 1000mg q12
Carb consistent diet  Metformin 1000mg q12
-Continue Zocor
Carb consistent diet  Metformin 1000mg q12

## 2018-06-10 NOTE — PROGRESS NOTE BEHAVIORAL HEALTH - PROBLEM SELECTOR PROBLEM 2
Type 2 diabetes mellitus without complication, without long-term current use of insulin
Type 2 diabetes mellitus without complication, without long-term current use of insulin
High blood cholesterol
Type 2 diabetes mellitus without complication, without long-term current use of insulin

## 2018-06-10 NOTE — PROGRESS NOTE BEHAVIORAL HEALTH - PRIMARY DX
Schizoaffective disorder, depressive type
Schizophrenia, unspecified type
Schizoaffective disorder, depressive type
Schizoaffective disorder, depressive type

## 2018-06-10 NOTE — PROGRESS NOTE BEHAVIORAL HEALTH - PROBLEM SELECTOR PROBLEM 1
Schizoaffective disorder, depressive type

## 2018-06-11 RX ORDER — METFORMIN HYDROCHLORIDE 850 MG/1
1 TABLET ORAL
Qty: 0 | Refills: 0 | COMMUNITY
Start: 2018-06-11

## 2018-06-11 RX ORDER — FLUOXETINE HCL 10 MG
1 CAPSULE ORAL
Qty: 0 | Refills: 0 | COMMUNITY
Start: 2018-06-11

## 2018-06-11 RX ORDER — LAMOTRIGINE 25 MG/1
0 TABLET, ORALLY DISINTEGRATING ORAL
Qty: 0 | Refills: 0 | COMMUNITY

## 2018-06-11 RX ORDER — LURASIDONE HYDROCHLORIDE 40 MG/1
0 TABLET ORAL
Qty: 0 | Refills: 0 | COMMUNITY

## 2018-06-11 RX ORDER — METFORMIN HYDROCHLORIDE 850 MG/1
0 TABLET ORAL
Qty: 0 | Refills: 0 | COMMUNITY

## 2018-06-11 RX ORDER — SIMVASTATIN 20 MG/1
1 TABLET, FILM COATED ORAL
Qty: 0 | Refills: 0 | COMMUNITY
Start: 2018-06-11

## 2018-06-11 RX ORDER — SIMVASTATIN 20 MG/1
0 TABLET, FILM COATED ORAL
Qty: 0 | Refills: 0 | COMMUNITY

## 2018-06-11 RX ORDER — LAMOTRIGINE 25 MG/1
1 TABLET, ORALLY DISINTEGRATING ORAL
Qty: 0 | Refills: 0 | COMMUNITY
Start: 2018-06-11

## 2018-06-11 RX ORDER — FLUOXETINE HCL 10 MG
0 CAPSULE ORAL
Qty: 0 | Refills: 0 | COMMUNITY

## 2018-06-11 RX ORDER — LURASIDONE HYDROCHLORIDE 40 MG/1
1 TABLET ORAL
Qty: 0 | Refills: 0 | COMMUNITY
Start: 2018-06-11

## 2018-06-11 RX ADMIN — Medication 2 MILLIGRAM(S): at 08:01

## 2018-06-11 RX ADMIN — LURASIDONE HYDROCHLORIDE 80 MILLIGRAM(S): 40 TABLET ORAL at 08:01

## 2018-06-11 RX ADMIN — LAMOTRIGINE 200 MILLIGRAM(S): 25 TABLET, ORALLY DISINTEGRATING ORAL at 08:00

## 2018-06-11 RX ADMIN — Medication 50 MILLIGRAM(S): at 01:18

## 2018-06-11 RX ADMIN — Medication 2 MILLIGRAM(S): at 21:03

## 2018-06-11 RX ADMIN — METFORMIN HYDROCHLORIDE 1000 MILLIGRAM(S): 850 TABLET ORAL at 08:01

## 2018-06-11 RX ADMIN — LAMOTRIGINE 200 MILLIGRAM(S): 25 TABLET, ORALLY DISINTEGRATING ORAL at 21:04

## 2018-06-11 RX ADMIN — Medication 40 MILLIGRAM(S): at 08:01

## 2018-06-11 RX ADMIN — METFORMIN HYDROCHLORIDE 1000 MILLIGRAM(S): 850 TABLET ORAL at 21:04

## 2018-06-11 RX ADMIN — SIMVASTATIN 20 MILLIGRAM(S): 20 TABLET, FILM COATED ORAL at 21:04

## 2018-06-11 NOTE — PROGRESS NOTE BEHAVIORAL HEALTH - NSBHADMITDANGERSELF_PSY_A_CORE
unable to care for self
psychosis/unable to care for self
unable to care for self
unable to care for self
unable to care for self/suicidal ideation with plan and means
unable to care for self
unable to care for self

## 2018-06-11 NOTE — PROGRESS NOTE BEHAVIORAL HEALTH - NSBHLEGALSTATUS_PSY_A_CORE
9.39 (Emergency)
9.27 (2PC)
9.39 (Emergency)

## 2018-06-11 NOTE — CHART NOTE - NSCHARTNOTEFT_GEN_A_CORE
Social work update:    Meeting held on 6/11 with PT, PT's wife (Pilar Couch) and MD Min to discuss progress, medications, treatment plan and discharge options.    PT will return to out patient provider Dr. Kulkarni when stable.     will continue to offer support daily and make collateral contacts as necessary.

## 2018-06-11 NOTE — PROGRESS NOTE BEHAVIORAL HEALTH - NSBHFUPTYPE_PSY_A_CORE
Inpatient
Inpatient-On Service Note
Inpatient-On Service Note
Inpatient
Inpatient

## 2018-06-11 NOTE — PROGRESS NOTE BEHAVIORAL HEALTH - NS ED BHA MSE GENERAL APPEARANCE
Other/Well developed
Well developed
Well developed/Other
Other/Well developed
Well developed/Other
Other/Well developed
Other/Well developed

## 2018-06-11 NOTE — PROGRESS NOTE BEHAVIORAL HEALTH - NS ED BHA MSE SPEECH SPONTANEITY
Increased latency
Increased latency
Normal
Increased latency

## 2018-06-11 NOTE — PROGRESS NOTE BEHAVIORAL HEALTH - THOUGHT PROCESS
Linear/Overinclusive
Overinclusive/Linear
Overinclusive/Circumstantial/Impaired reasoning/Perseverative
Perseverative
Circumstantial/Perseverative/Impaired reasoning/Overinclusive
Linear/Overinclusive
Overinclusive/Linear

## 2018-06-11 NOTE — PROGRESS NOTE BEHAVIORAL HEALTH - NSBHFUPINTERVALHXFT_PSY_A_CORE
Pt states he feels better including improved mood. Denies suicidal ideation. Denies any side effects.
Pt states he feels better today- including less paranoid and improved mood. Denies suicidal ideation. Denies any side effects.  Feels he is benefiting from the increase in medication. States his visit with his wife are going well.   Slept 7 hours and woke up around 5 am - did not take a vistaril as he was worried he could miss breakfast and able to go back to sleep after 45 min.   Did not require any prns after morning dose yesterday. Pt is shaving, showering daily and visible on the unit.
patient reports that for 10 years he has had intermittent thoughts that the government is monitoring him. He has been told he is paranoid and this makes him feel better for a day or two and then he continues to have intrusive thoughts of being watched. Presently, he feels strongly that he is being monitored.
patient reports that the increase in latuda is helping him with the paranoia, he says he feels calmer. He reports appropriate sleep and appetite. He says he feels safe in the hospital. He expresses sadness over the effect that his "paranoia" has over his family, and says his goal is for the paranoia to go away. He is well related on interview.
Pt states he feels better including less paranoid and improved mood. Denies suicidal ideation. Denies any side effects.
Patient seen and examined with treatment team during morning rounds. Patient reports feeling "much better" in terms of his anxiety and his paranoid thoughts. He admits to still having some paranoid thoughts about cars following him on the outside, but "I think about it about 5% of the day, as opposed to 95% of the day." He reports good sleep and appetite, and reports looking forward to going home to attend his daughter's award ceremony later this week. He denies any suicidal or homicidal ideations. He reports feeling safe in the hospital and on the unit. No behavioural events overnight.  Met with patient's wife, who agrees that patient has improved significantly and patient is ready for discharge.
Pt seen and evaluated today with treatment team. Pt states he is feeling a little better but did have an awakening at 3/4am and got a vistaril. States it was helpful and was able to get back to sleep. States he was feeling paranoid. Reports his mood is low and attributing this to his family not able to come visit him. Denies any suicidal ideation at this time.    Spoke with patients wife who states that pt has been worse over the last month. He has been agitated, more depressed, tearful with outbursts and crying spells, more paranoid causing pt to bother neighbors and calling friends to obtain reassurance that they are not spying on him/working with the government, poor self care. She was concerned he is suicidal.     Spoke with Dr. Kulkarni who states the patient has not responded well to any antipsychotic trails including Risperidone, Haldol, Geodon, and currently not responding to titration on Latuda. States he might be appropriate for Clozaril trail.

## 2018-06-11 NOTE — PROGRESS NOTE BEHAVIORAL HEALTH - NSBHADMITIPOBS_PSY_A_CORE
Enhanced supervision
Routine observation
Enhanced supervision
Enhanced supervision
Routine observation
Enhanced supervision
Routine observation

## 2018-06-11 NOTE — DISCHARGE NOTE BEHAVIORAL HEALTH - MEDICATION SUMMARY - MEDICATIONS TO TAKE
I will START or STAY ON the medications listed below when I get home from the hospital:    clonazePAM 1 mg oral tablet  -- 1 milligram(s) by mouth 4 times a day MDD:4mg   -- Indication: For Paranoid schizophrenia    lamoTRIgine 200 mg oral tablet  -- 1 tab(s) by mouth every 12 hours  -- Indication: For Paranoid schizophrenia    FLUoxetine 40 mg oral capsule  -- 1 cap(s) by mouth once a day  -- Indication: For Paranoid schizophrenia    metFORMIN 1000 mg oral tablet  -- 1 tab(s) by mouth 2 times a day  -- Indication: For Paranoid schizophrenia    simvastatin 20 mg oral tablet  -- 1 tab(s) by mouth once a day (at bedtime)  -- Indication: For Paranoid schizophrenia    lurasidone 80 mg oral tablet  -- 1 tab(s) by mouth once a day  -- Indication: For Paranoid schizophrenia

## 2018-06-11 NOTE — DISCHARGE NOTE BEHAVIORAL HEALTH - MEDICATION SUMMARY - MEDICATIONS TO CHANGE
I will SWITCH the dose or number of times a day I take the medications listed below when I get home from the hospital:    Latuda    clonazePAM    FLUoxetine

## 2018-06-11 NOTE — PROGRESS NOTE BEHAVIORAL HEALTH - THOUGHT CONTENT
Preoccupations/Delusions
Delusions
Preoccupations/Delusions
Delusions/Preoccupations
Delusions/Preoccupations

## 2018-06-11 NOTE — PROGRESS NOTE BEHAVIORAL HEALTH - NSBHFUPINTERVALCCFT_PSY_A_CORE
"Im feeling better"
"Im feeling better"  I am feeling less paranoid. I think I am ready for discharge.
I am paranoid
I am paranoid and my family is dysfunctional because of it
"Im feeling better" I think I am ready for discharge.
"I am feeling much better."
I am feeling a little better

## 2018-06-11 NOTE — PROGRESS NOTE ADULT - SUBJECTIVE AND OBJECTIVE BOX
pt stable alert in NAD  no new complaints    PARANOID SCHIZOPHRENIA; AGGRESSIVE BEHAVIOR; DELUSIONS  ^PARANOID SCHIZOPHRENIA; AGGRESSIVE BEHAVIOR; DELUSIONS  No pertinent family history in first degree relatives  MEWS Score  Paranoid schizophrenia  High blood cholesterol  Diabetes mellitus, type 2  Paranoid schizophrenia  Schizophrenia, unspecified type  Diabetes mellitus, type 2  Schizoaffective disorder, depressive type  Mood disorder  Type 2 diabetes mellitus without complication, without long-term current use of insulin  High blood cholesterol  Delusional disorder  Major depression, recurrent, chronic  Autism spectrum disorder  Tic like phenomenon  S/P knee surgery  No significant past surgical history  AGITATED  90+  Delusions  Aggressive behavior    HEALTH ISSUES - PROBLEM Dx:  Schizophrenia, unspecified type  Diabetes mellitus, type 2: Diabetes mellitus, type 2  Schizoaffective disorder, depressive type: Schizoaffective disorder, depressive type  Mood disorder: Mood disorder  Type 2 diabetes mellitus without complication, without long-term current use of insulin: Type 2 diabetes mellitus without complication, without long-term current use of insulin  High blood cholesterol: High blood cholesterol  Delusional disorder  Major depression, recurrent, chronic  Autism spectrum disorder  Tic like phenomenon        PAST MEDICAL & SURGICAL HISTORY:  Paranoid schizophrenia  High blood cholesterol  Diabetes mellitus, type 2  S/P knee surgery    No Known Allergies      FAMILY HISTORY:  No pertinent family history in first degree relatives      clonazePAM Tablet 2 milliGRAM(s) Oral every 12 hours  FLUoxetine 40 milliGRAM(s) Oral daily  hydrOXYzine hydrochloride 50 milliGRAM(s) Oral every 6 hours PRN  lamoTRIgine 200 milliGRAM(s) Oral every 12 hours  lurasidone 80 milliGRAM(s) Oral daily  metFORMIN 1000 milliGRAM(s) Oral two times a day  simvastatin 20 milliGRAM(s) Oral at bedtime      T(C): 35.9 (06-11-18 @ 06:24), Max: 35.9 (06-11-18 @ 06:24)  HR: 58 (06-11-18 @ 06:24) (58 - 92)  BP: 107/70 (06-11-18 @ 06:24) (107/70 - 110/68)  RR: 16 (06-11-18 @ 06:24) (16 - 16)  SpO2: --    PE;  general:  unchanged adn stable    Lungs:    Heart:    EXT:    Neuro:                          CAPILLARY BLOOD GLUCOSE

## 2018-06-11 NOTE — PROGRESS NOTE BEHAVIORAL HEALTH - NSBHPTASSESSDT_PSY_A_CORE
05-Jun-2018 22:14
06-Jun-2018 15:22
07-Jun-2018 14:41
08-Jun-2018 14:41
10-Hari-2018 06:43
09-Jun-2018 10:41
11-Jun-2018 16:20

## 2018-06-11 NOTE — DISCHARGE NOTE BEHAVIORAL HEALTH - PROVIDER TOKENS
FREE:[LAST:[Ohn],FIRST:[Kyi],PHONE:[phone]],FAX:[phone]    -],ADDRESS:[[address] [place] [address]]]

## 2018-06-11 NOTE — DISCHARGE NOTE BEHAVIORAL HEALTH - HPI (INCLUDE ILLNESS QUALITY, SEVERITY, DURATION, TIMING, CONTEXT, MODIFYING FACTORS, ASSOCIATED SIGNS AND SYMPTOMS)
As per intake:   54 yo MWM w ho past dx of paranoid personality do, schizoaffective disorder, delusional disorder presents with worsening of paranoid symptoms. Pt reports having been diagnosed with schizoaffective disorder in June 2007 after presenting with belief that he was being watched by neighbors for "government reasons" and being fired from job and pt mentioned email from boss telling him that he "feared for my safety and the safety of my employees and my emotional well being". Pt had a 10 day stay in [facility] for psych reasons. Pt is compliant with treatment of seeing Dr Kulkarni every few weeks and is on fluoxetine 20 mg qd, lamotragine 200 mg po q12h, clonazepam 2 mg po q12h, latuda 60 mg po qd and has been on this regimen for several months with most recent change being an inc in benzo as he was orig rx 2 mg total for day but was taking 3-5 mg per day and dosage was inc and pt has been taking appropriately as rx since then. Pt has been experiencing decompensation in recent days with worry that he was being watched by cars, worrying that the government is watching him to find out if he is driving to give this evidence to prosecutors who would pass that on to a  which would get him in legal trouble. Pt has been on SSD for mental illness and at the time of SSD assessment, he reported difficulty with driving and since then periodically worries that he is being observed that he is able to drive well. In recent days, pt has been concerned about cars on his block. Yesterday, he saw a car parked in front of neighbors home and thought it might be from [employer], so called [employer] to find out if they had sent a car to [facility]. Pt then began ringing doorbells of neighbors to ask them about cars in area. He has also called 2 of his 4 friends to ask 3 questions "are you my friend? are you watching me? are you recording me?" repeatedly. He reports his friends denied this and wife took phone away before he could call a 3rd person. Pt reports sadness as one friend told him "I can't have you around my wife and kids anymore" and he has had this friend for 30 yrs. Pt has also felt that he is being recorded. 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 Pt waited in car for her during her sessions. At each session, he became increasingly worried about being watched and at the last session she attended, he was screaming while in car. Wife was going to take him to ER, but was worried about his agitation and so called [employer] for escort. Pt reports he didn't want an escort, so when they arrived, he locked himself within car and began hitting himself in the face repeatedly, pt estimates 30 times (pt does have bruised and swollen right cheek) and yelling "I don't want an escort". [employer] did not intervene until EMS responded and then was able to start an IV and transport him to ER. Pt reported that while in car, he had urge to take a large quantity of clonazepam to hurt himself "my doctor told me if I take too much, it can stop my heart". Pt did not have any meds on him. Pt reports that currently he has "zero depression" but states this was his original diagnosis for SSD. Pt pleasant and cooperative, good historian but exam noted for flat tone of voice and pt repeating facts in a robotic fashion multiple times during assessment.

## 2018-06-12 VITALS
DIASTOLIC BLOOD PRESSURE: 63 MMHG | TEMPERATURE: 96 F | SYSTOLIC BLOOD PRESSURE: 105 MMHG | HEART RATE: 85 BPM | RESPIRATION RATE: 18 BRPM

## 2018-06-12 RX ORDER — SIMVASTATIN 20 MG/1
1 TABLET, FILM COATED ORAL
Qty: 30 | Refills: 0 | OUTPATIENT
Start: 2018-06-12

## 2018-06-12 RX ORDER — FLUOXETINE HCL 10 MG
1 CAPSULE ORAL
Qty: 30 | Refills: 0 | OUTPATIENT
Start: 2018-06-12

## 2018-06-12 RX ORDER — LURASIDONE HYDROCHLORIDE 40 MG/1
1 TABLET ORAL
Qty: 30 | Refills: 0 | OUTPATIENT
Start: 2018-06-12

## 2018-06-12 RX ORDER — METFORMIN HYDROCHLORIDE 850 MG/1
1 TABLET ORAL
Qty: 60 | Refills: 0 | OUTPATIENT
Start: 2018-06-12

## 2018-06-12 RX ORDER — CLONAZEPAM 1 MG
1 TABLET ORAL
Qty: 120 | Refills: 0 | OUTPATIENT
Start: 2018-06-12 | End: 2018-07-11

## 2018-06-12 RX ORDER — LAMOTRIGINE 25 MG/1
1 TABLET, ORALLY DISINTEGRATING ORAL
Qty: 60 | Refills: 0 | OUTPATIENT
Start: 2018-06-12

## 2018-06-12 RX ORDER — CLONAZEPAM 1 MG
0 TABLET ORAL
Qty: 0 | Refills: 0 | COMMUNITY

## 2018-06-12 RX ADMIN — METFORMIN HYDROCHLORIDE 1000 MILLIGRAM(S): 850 TABLET ORAL at 08:05

## 2018-06-12 RX ADMIN — Medication 40 MILLIGRAM(S): at 11:34

## 2018-06-12 RX ADMIN — LURASIDONE HYDROCHLORIDE 80 MILLIGRAM(S): 40 TABLET ORAL at 08:05

## 2018-06-12 RX ADMIN — LAMOTRIGINE 200 MILLIGRAM(S): 25 TABLET, ORALLY DISINTEGRATING ORAL at 08:05

## 2018-06-12 NOTE — PROGRESS NOTE ADULT - SUBJECTIVE AND OBJECTIVE BOX
pt stable alert in NAD  no new complaints    PARANOID SCHIZOPHRENIA; AGGRESSIVE BEHAVIOR; DELUSIONS  ^PARANOID SCHIZOPHRENIA; AGGRESSIVE BEHAVIOR; DELUSIONS  No pertinent family history in first degree relatives  MEWS Score  Paranoid schizophrenia  High blood cholesterol  Diabetes mellitus, type 2  Paranoid schizophrenia  Schizophrenia, unspecified type  Diabetes mellitus, type 2  Schizoaffective disorder, depressive type  Mood disorder  Type 2 diabetes mellitus without complication, without long-term current use of insulin  High blood cholesterol  Delusional disorder  Major depression, recurrent, chronic  Autism spectrum disorder  Tic like phenomenon  S/P knee surgery  No significant past surgical history  AGITATED  90+  Delusions  Aggressive behavior      No Known Allergies          FLUoxetine 40 milliGRAM(s) Oral daily  hydrOXYzine hydrochloride 50 milliGRAM(s) Oral every 6 hours PRN  lamoTRIgine 200 milliGRAM(s) Oral every 12 hours  lurasidone 80 milliGRAM(s) Oral daily  metFORMIN 1000 milliGRAM(s) Oral two times a day  simvastatin 20 milliGRAM(s) Oral at bedtime      T(C): 35.8 (06-12-18 @ 05:47), Max: 36.4 (06-11-18 @ 12:21)  HR: 57 (06-12-18 @ 05:47) (57 - 67)  BP: 108/58 (06-12-18 @ 05:47) (106/53 - 114/71)  RR: 17 (06-12-18 @ 05:47) (17 - 18)  SpO2: --    PE;  general:  stabel alert    Lungs:  clear  Heart:noarml    EXT:  from  Neuro:   nod eficits                        CAPILLARY BLOOD GLUCOSE

## 2018-06-12 NOTE — CHART NOTE - NSCHARTNOTEFT_GEN_A_CORE
Social work discharge note:    PT is alert and oriented x3, denies homicidal or suicidal ideation, no psychosis noted.    MD met with PT's wife Pilar Couch, she has no concerns for discharge, she is in agreement with discharge plan.    PT was given appointment with his previous provider Dr. Kulkarni and Dr. Diaz, this writer spoke to both providers on 6/12 prior to discharge, they had no concerns.     will follow up with out patient provider after discharge to ensure compliance with appointment.

## 2018-06-12 NOTE — PROGRESS NOTE ADULT - PROBLEM SELECTOR PROBLEM 2
Diabetes mellitus, type 2
Mood disorder
Schizophrenia, unspecified type
Type 2 diabetes mellitus without complication, without long-term current use of insulin
High blood cholesterol

## 2018-06-12 NOTE — PROGRESS NOTE ADULT - PROBLEM SELECTOR PLAN 2
-Will continue Lamcital and Prozac at current dose
cotn with meds and to f/u with nme in office one week
continue present treatment as per psych plan as reviewed  Medically stable with no new changes in treatment  will continue to monitor medical status while being treated on psych
cotn current tx no new changes
-Continue Zocor 40mg qHS

## 2018-06-12 NOTE — PROGRESS NOTE ADULT - PROBLEM SELECTOR PLAN 1
continue present treatment as per psych plan as reviewed  Medically stable with no new changes in treatment  will continue to monitor medical status while being treated on psych   [facility]

## 2018-06-12 NOTE — PROGRESS NOTE ADULT - PROBLEM SELECTOR PROBLEM 1
Delusional disorder
Schizophrenia, unspecified type
Diabetes mellitus, type 2
Schizoaffective disorder, depressive type
Schizoaffective disorder, depressive type

## 2018-06-15 DIAGNOSIS — F95.8 OTHER TIC DISORDERS: ICD-10-CM

## 2018-06-15 DIAGNOSIS — E11.9 TYPE 2 DIABETES MELLITUS WITHOUT COMPLICATIONS: ICD-10-CM

## 2018-06-15 DIAGNOSIS — F20.0 PARANOID SCHIZOPHRENIA: ICD-10-CM

## 2018-06-15 DIAGNOSIS — F33.9 MAJOR DEPRESSIVE DISORDER, RECURRENT, UNSPECIFIED: ICD-10-CM

## 2018-06-15 DIAGNOSIS — F84.0 AUTISTIC DISORDER: ICD-10-CM

## 2018-06-15 DIAGNOSIS — I10 ESSENTIAL (PRIMARY) HYPERTENSION: ICD-10-CM

## 2018-06-15 DIAGNOSIS — Z81.8 FAMILY HISTORY OF OTHER MENTAL AND BEHAVIORAL DISORDERS: ICD-10-CM

## 2018-06-15 DIAGNOSIS — Z79.84 LONG TERM (CURRENT) USE OF ORAL HYPOGLYCEMIC DRUGS: ICD-10-CM

## 2018-06-15 DIAGNOSIS — R45.851 SUICIDAL IDEATIONS: ICD-10-CM

## 2018-10-02 NOTE — PROGRESS NOTE BEHAVIORAL HEALTH - RECENT MEMORY
"You were seen today in the Cardiovascular Clinic at the [facility].     Cardiology Providers you saw during your visit: Nela March NP     Follow up and medication changes:  1. Increase coreg (carvedilol) to 6.25mg twice daily  2. In one week (Tuesday 10/9), increase lisinopril to 10mg twice daily  3. Follow up with your primary care provider re: diabetes  4. Return to clinic to see Dr. Matos 10/31.      Results for KELSY NOLAN (MRN [id number]) as of 10/2/2018 07:15   Ref. Range 10/2/2018 06:46   Sodium Latest Ref Range: 133 - 144 mmol/L 134   Potassium Latest Ref Range: 3.4 - 5.3 mmol/L 4.5   Chloride Latest Ref Range: 94 - 109 mmol/L 100   Carbon Dioxide Latest Ref Range: 20 - 32 mmol/L 26   Urea Nitrogen Latest Ref Range: 7 - 30 mg/dL 18   Creatinine Latest Ref Range: 0.66 - 1.25 mg/dL 0.79   GFR Estimate Latest Ref Range: >60 mL/min/1.7m2 >90   GFR Estimate If Black Latest Ref Range: >60 mL/min/1.7m2 >90   Calcium Latest Ref Range: 8.5 - 10.1 mg/dL 9.0   Anion Gap Latest Ref Range: 3 - 14 mmol/L 8   N-Terminal Pro Bnp Latest Ref Range: 0 - 125 pg/mL 812 (H)   Glucose Latest Ref Range: 70 - 99 mg/dL 164 (H)       Please limit your fluid intake to 2 L (68 ounces) daily.  2 Liters a day = 8.5 cups, or 68 ounces.  Please limit your salt intake to 2 grams a day or less.     If you gain 2# in 24 hours or 5# in one week call Meli Alfred RN so we can adjust your medications as needed over the phone.     Please feel free to call me with any questions or concerns.       Meli Alfred RN  [facility]  Cardiology Care Coordinator-Heart Failure Clinic     Questions and [phone].   First press #1 for the University and then press #3 for \"Medical Questions\" to reach us Cardiology Nurses.      On Call Cardiologist for after hours or on weekends: [phone]   option #4 and ask to speak to the on-call Cardiologist. Inform them you are a CORE/heart failure patient at the "
[facility].        If you need a medication refill please contact your pharmacy.  Please allow 3 business days for your refill to be completed.  _______________________________________________________  [facility]IN[facility] Cardiomyopathy, Optimization, Rehabilitation, Education   The [facility].R.E[facility] CLINIC is a heart failure specialty clinic within the [facility] where you will work with specialized nurse practitioners dedicated to helping patients with heart failure carefully adjust medications, receive education, and learn who and when to call if symptoms develop. They specialize in helping you better understand your condition, slow the progression of your disease, improve the length and quality of your life, help you detect future heart problems before they become life threatening, and avoid hospitalizations.  As always, thank you for trusting us with your health care needs!  
Normal

## 2018-11-02 ENCOUNTER — INPATIENT (INPATIENT)
Facility: HOSPITAL | Age: 48
LOS: 3 days | Discharge: HOME | End: 2018-11-06
Attending: PSYCHIATRY & NEUROLOGY | Admitting: PSYCHIATRY & NEUROLOGY

## 2018-11-02 VITALS
WEIGHT: 197.98 LBS | OXYGEN SATURATION: 100 % | SYSTOLIC BLOOD PRESSURE: 134 MMHG | RESPIRATION RATE: 20 BRPM | TEMPERATURE: 98 F | DIASTOLIC BLOOD PRESSURE: 84 MMHG | HEART RATE: 84 BPM

## 2018-11-02 DIAGNOSIS — F25.0 SCHIZOAFFECTIVE DISORDER, BIPOLAR TYPE: ICD-10-CM

## 2018-11-02 DIAGNOSIS — E11.9 TYPE 2 DIABETES MELLITUS WITHOUT COMPLICATIONS: ICD-10-CM

## 2018-11-02 DIAGNOSIS — F22 DELUSIONAL DISORDERS: ICD-10-CM

## 2018-11-02 DIAGNOSIS — Z98.890 OTHER SPECIFIED POSTPROCEDURAL STATES: Chronic | ICD-10-CM

## 2018-11-02 DIAGNOSIS — E78.00 PURE HYPERCHOLESTEROLEMIA, UNSPECIFIED: ICD-10-CM

## 2018-11-02 DIAGNOSIS — F20.0 PARANOID SCHIZOPHRENIA: ICD-10-CM

## 2018-11-02 DIAGNOSIS — F60.0 PARANOID PERSONALITY DISORDER: ICD-10-CM

## 2018-11-02 LAB
ALBUMIN SERPL ELPH-MCNC: 4.6 G/DL — SIGNIFICANT CHANGE UP (ref 3.5–5.2)
ALP SERPL-CCNC: 62 U/L — SIGNIFICANT CHANGE UP (ref 30–115)
ALT FLD-CCNC: 29 U/L — SIGNIFICANT CHANGE UP (ref 0–41)
AMPHET UR-MCNC: NEGATIVE — SIGNIFICANT CHANGE UP
ANION GAP SERPL CALC-SCNC: 13 MMOL/L — SIGNIFICANT CHANGE UP (ref 7–14)
APAP SERPL-MCNC: <5 UG/ML — LOW (ref 10–30)
AST SERPL-CCNC: 26 U/L — SIGNIFICANT CHANGE UP (ref 0–41)
BARBITURATES UR SCN-MCNC: NEGATIVE — SIGNIFICANT CHANGE UP
BASOPHILS # BLD AUTO: 0.08 K/UL — SIGNIFICANT CHANGE UP (ref 0–0.2)
BASOPHILS NFR BLD AUTO: 0.7 % — SIGNIFICANT CHANGE UP (ref 0–1)
BENZODIAZ UR-MCNC: POSITIVE
BILIRUB SERPL-MCNC: <0.2 MG/DL — SIGNIFICANT CHANGE UP (ref 0.2–1.2)
BUN SERPL-MCNC: 18 MG/DL — SIGNIFICANT CHANGE UP (ref 10–20)
CALCIUM SERPL-MCNC: 10.1 MG/DL — SIGNIFICANT CHANGE UP (ref 8.5–10.1)
CHLORIDE SERPL-SCNC: 102 MMOL/L — SIGNIFICANT CHANGE UP (ref 98–110)
CO2 SERPL-SCNC: 26 MMOL/L — SIGNIFICANT CHANGE UP (ref 17–32)
COCAINE METAB.OTHER UR-MCNC: NEGATIVE — SIGNIFICANT CHANGE UP
CREAT SERPL-MCNC: 1.2 MG/DL — SIGNIFICANT CHANGE UP (ref 0.7–1.5)
EOSINOPHIL # BLD AUTO: 0.34 K/UL — SIGNIFICANT CHANGE UP (ref 0–0.7)
EOSINOPHIL NFR BLD AUTO: 3.2 % — SIGNIFICANT CHANGE UP (ref 0–8)
ETHANOL SERPL-MCNC: <10 MG/DL — HIGH
GLUCOSE SERPL-MCNC: 131 MG/DL — HIGH (ref 70–99)
HCT VFR BLD CALC: 43.1 % — SIGNIFICANT CHANGE UP (ref 42–52)
HGB BLD-MCNC: 14.5 G/DL — SIGNIFICANT CHANGE UP (ref 14–18)
IMM GRANULOCYTES NFR BLD AUTO: 0.7 % — HIGH (ref 0.1–0.3)
LYMPHOCYTES # BLD AUTO: 3.27 K/UL — SIGNIFICANT CHANGE UP (ref 1.2–3.4)
LYMPHOCYTES # BLD AUTO: 30.5 % — SIGNIFICANT CHANGE UP (ref 20.5–51.1)
MCHC RBC-ENTMCNC: 29.5 PG — SIGNIFICANT CHANGE UP (ref 27–31)
MCHC RBC-ENTMCNC: 33.6 G/DL — SIGNIFICANT CHANGE UP (ref 32–37)
MCV RBC AUTO: 87.6 FL — SIGNIFICANT CHANGE UP (ref 80–94)
METHADONE UR-MCNC: NEGATIVE — SIGNIFICANT CHANGE UP
MONOCYTES # BLD AUTO: 1.02 K/UL — HIGH (ref 0.1–0.6)
MONOCYTES NFR BLD AUTO: 9.5 % — HIGH (ref 1.7–9.3)
NEUTROPHILS # BLD AUTO: 5.94 K/UL — SIGNIFICANT CHANGE UP (ref 1.4–6.5)
NEUTROPHILS NFR BLD AUTO: 55.4 % — SIGNIFICANT CHANGE UP (ref 42.2–75.2)
NRBC # BLD: 0 /100 WBCS — SIGNIFICANT CHANGE UP (ref 0–0)
OPIATES UR-MCNC: NEGATIVE — SIGNIFICANT CHANGE UP
PCP SPEC-MCNC: SIGNIFICANT CHANGE UP
PLATELET # BLD AUTO: 278 K/UL — SIGNIFICANT CHANGE UP (ref 130–400)
POTASSIUM SERPL-MCNC: 4.9 MMOL/L — SIGNIFICANT CHANGE UP (ref 3.5–5)
POTASSIUM SERPL-SCNC: 4.9 MMOL/L — SIGNIFICANT CHANGE UP (ref 3.5–5)
PROPOXYPHENE QUALITATIVE URINE RESULT: NEGATIVE — SIGNIFICANT CHANGE UP
PROT SERPL-MCNC: 7.4 G/DL — SIGNIFICANT CHANGE UP (ref 6–8)
RBC # BLD: 4.92 M/UL — SIGNIFICANT CHANGE UP (ref 4.7–6.1)
RBC # FLD: 12.2 % — SIGNIFICANT CHANGE UP (ref 11.5–14.5)
SALICYLATES SERPL-MCNC: <0.3 MG/DL — LOW (ref 4–30)
SODIUM SERPL-SCNC: 141 MMOL/L — SIGNIFICANT CHANGE UP (ref 135–146)
WBC # BLD: 10.73 K/UL — SIGNIFICANT CHANGE UP (ref 4.8–10.8)
WBC # FLD AUTO: 10.73 K/UL — SIGNIFICANT CHANGE UP (ref 4.8–10.8)

## 2018-11-02 RX ORDER — CLOZAPINE 150 MG/1
25 TABLET, ORALLY DISINTEGRATING ORAL AT BEDTIME
Qty: 0 | Refills: 0 | Status: COMPLETED | OUTPATIENT
Start: 2018-11-02 | End: 2018-11-02

## 2018-11-02 RX ORDER — SIMVASTATIN 20 MG/1
20 TABLET, FILM COATED ORAL AT BEDTIME
Qty: 0 | Refills: 0 | Status: DISCONTINUED | OUTPATIENT
Start: 2018-11-02 | End: 2018-11-06

## 2018-11-02 RX ORDER — SIMVASTATIN 20 MG/1
20 TABLET, FILM COATED ORAL AT BEDTIME
Qty: 0 | Refills: 0 | Status: DISCONTINUED | OUTPATIENT
Start: 2018-11-02 | End: 2018-11-02

## 2018-11-02 RX ORDER — METFORMIN HYDROCHLORIDE 850 MG/1
1000 TABLET ORAL
Qty: 0 | Refills: 0 | Status: DISCONTINUED | OUTPATIENT
Start: 2018-11-02 | End: 2018-11-02

## 2018-11-02 RX ORDER — CLONAZEPAM 1 MG
1 TABLET ORAL THREE TIMES A DAY
Qty: 0 | Refills: 0 | Status: DISCONTINUED | OUTPATIENT
Start: 2018-11-02 | End: 2018-11-06

## 2018-11-02 RX ORDER — CLOZAPINE 150 MG/1
25 TABLET, ORALLY DISINTEGRATING ORAL
Qty: 0 | Refills: 0 | Status: DISCONTINUED | OUTPATIENT
Start: 2018-11-04 | End: 2018-11-05

## 2018-11-02 RX ORDER — CLONAZEPAM 1 MG
1 TABLET ORAL THREE TIMES A DAY
Qty: 0 | Refills: 0 | Status: DISCONTINUED | OUTPATIENT
Start: 2018-11-02 | End: 2018-11-02

## 2018-11-02 RX ORDER — LAMOTRIGINE 25 MG/1
200 TABLET, ORALLY DISINTEGRATING ORAL
Qty: 0 | Refills: 0 | Status: DISCONTINUED | OUTPATIENT
Start: 2018-11-02 | End: 2018-11-02

## 2018-11-02 RX ORDER — LURASIDONE HYDROCHLORIDE 40 MG/1
80 TABLET ORAL DAILY
Qty: 0 | Refills: 0 | Status: DISCONTINUED | OUTPATIENT
Start: 2018-11-02 | End: 2018-11-02

## 2018-11-02 RX ORDER — CLOZAPINE 150 MG/1
TABLET, ORALLY DISINTEGRATING ORAL
Qty: 0 | Refills: 0 | Status: DISCONTINUED | OUTPATIENT
Start: 2018-11-02 | End: 2018-11-05

## 2018-11-02 RX ORDER — METFORMIN HYDROCHLORIDE 850 MG/1
1000 TABLET ORAL
Qty: 0 | Refills: 0 | Status: DISCONTINUED | OUTPATIENT
Start: 2018-11-02 | End: 2018-11-06

## 2018-11-02 RX ORDER — LAMOTRIGINE 25 MG/1
200 TABLET, ORALLY DISINTEGRATING ORAL
Qty: 0 | Refills: 0 | Status: DISCONTINUED | OUTPATIENT
Start: 2018-11-02 | End: 2018-11-06

## 2018-11-02 RX ORDER — FLUOXETINE HCL 10 MG
40 CAPSULE ORAL DAILY
Qty: 0 | Refills: 0 | Status: DISCONTINUED | OUTPATIENT
Start: 2018-11-02 | End: 2018-11-06

## 2018-11-02 RX ORDER — FLUOXETINE HCL 10 MG
40 CAPSULE ORAL ONCE
Qty: 0 | Refills: 0 | Status: DISCONTINUED | OUTPATIENT
Start: 2018-11-02 | End: 2018-11-02

## 2018-11-02 RX ADMIN — METFORMIN HYDROCHLORIDE 1000 MILLIGRAM(S): 850 TABLET ORAL at 20:45

## 2018-11-02 RX ADMIN — Medication 1 MILLIGRAM(S): at 08:05

## 2018-11-02 RX ADMIN — LAMOTRIGINE 200 MILLIGRAM(S): 25 TABLET, ORALLY DISINTEGRATING ORAL at 20:46

## 2018-11-02 RX ADMIN — Medication 40 MILLIGRAM(S): at 08:06

## 2018-11-02 RX ADMIN — LAMOTRIGINE 200 MILLIGRAM(S): 25 TABLET, ORALLY DISINTEGRATING ORAL at 08:06

## 2018-11-02 RX ADMIN — SIMVASTATIN 20 MILLIGRAM(S): 20 TABLET, FILM COATED ORAL at 21:49

## 2018-11-02 RX ADMIN — CLOZAPINE 25 MILLIGRAM(S): 150 TABLET, ORALLY DISINTEGRATING ORAL at 20:46

## 2018-11-02 RX ADMIN — Medication 1 MILLIGRAM(S): at 12:01

## 2018-11-02 RX ADMIN — METFORMIN HYDROCHLORIDE 1000 MILLIGRAM(S): 850 TABLET ORAL at 08:06

## 2018-11-02 NOTE — ED PROVIDER NOTE - ATTENDING CONTRIBUTION TO CARE
48 year old male, pmhx of schizoaffective, comes in with request to be admitted because he wants to be changed off f his clozipine, np cp/sob, no n/v/d, no loc, no fever.     CONSTITUTIONAL: Well-developed; well-nourished; in no acute distress. Sitting up and providing appropriate history and physical examination  SKIN: skin exam is warm and dry, no acute rash.  HEAD: Normocephalic; atraumatic.  EYES: PERRL, 3 mm bilateral, no nystagmus, EOM intact; conjunctiva and sclera clear.  ENT: No nasal discharge; airway clear.  NECK: Supple; non tender. + full passive ROM in all directions. No JVD  CARD: S1, S2 normal; no murmurs, gallops, or rubs. Regular rate and rhythm. + Symmetric Strong Pulses  RESP: No wheezes, rales or rhonchi. Good air movement bilaterally  ABD: soft; non-distended; non-tender. No Rebound, No Guarding, No signs of peritonitis, No CVA tenderness. No pulsatile abdominal mass. + Strong and Symmetric Pulses  EXT: Normal ROM. No clubbing, cyanosis or edema. Dp and Pt Pulses intact. Cap refill less than 3 seconds  NEURO: CN 2-12 intact, normal finger to nose, normal romberg, stable gait, no sensory or motor deficits, Alert, oriented, grossly unremarkable. No Focal deficits. GCS 15. NIH 0  PSYCH: Cooperative, appropriate. no si or hi, no a.v hallucinations.     Seen and evaluated by Psychiatry Dr. Garrido, patient will be admitted to psychiatry.

## 2018-11-02 NOTE — ED BEHAVIORAL HEALTH ASSESSMENT NOTE - DETAILS
His two sisters  have schizophrenia . maternal committed suicide and had mental illness yes as above

## 2018-11-02 NOTE — ED BEHAVIORAL HEALTH ASSESSMENT NOTE - SUMMARY
48 year old   male  with a long h/o mental illness and chronic paranoid delusions , had been on multiple antipsychotic medications with poor response . pt wants to get admitted at the request of his psychiatrist to  start  Clozapine

## 2018-11-02 NOTE — PROGRESS NOTE BEHAVIORAL HEALTH - NSBHADDHXFAMFT_PSY_A_CORE
has long family history of psychiatric illnesses. two sisters that suffer from psychiatric illnesses, said that they are on meds and have been hospitalized for mental illness, however unable to specify diagnoses. his brother has paranoid schizophrenia and his mother had bipolar disorder.

## 2018-11-02 NOTE — ED PROVIDER NOTE - NS ED ROS FT
Review of Systems    Constitutional: (-) fever  Eyes/ENT: (-) change in vision, (-) sore throat, (-) ear pain  Cardiovascular: (-) chest pain, (-) palpitation   Respiratory: (-) cough, (-) shortness of breath  Gastrointestinal: (-) abdominal pain (-) vomiting, (-) diarrhea  Musculoskeletal: (-) neck pain, (-) back pain, (-) joint pain  Integumentary: (-) rash, (-) edema  Neurological: (-) headache, (-) altered mental status  Heme/Lymph: (-) easy bruising (-) easy bleeding (-) lymphadenopathy  Allergic/Immunologic: (-) pruritus

## 2018-11-02 NOTE — H&P ADULT - HISTORY OF PRESENT ILLNESS
· HPI (include illness quality, severity, duration, timing, context, modifying factors, associated signs and symptoms)	48 year old    , unemployed  male  who is domiciled with his family , has a long history of  mental illness ( schizoaffective  d/o )  , 2 prior psych admissions and  has been on many antipsychotic  medications  with poor response , presents with chronic  fixed paranoid delusions  that  people are watching him through cars  and government is after him.  He is very frustrated that no medications is helping him . He follows up with Dr Kulkarni  once a month and  Dr Meza  for therapy twice a week . pt states that  his psychiatrist and therapist recommended  to get admitted to start  Clozapine. He denies  feeling depress but feels hopeless at times not getting better.  He denies hearing voices. He denies suicidal / homicidal ideations intent or plan .

## 2018-11-02 NOTE — CONSULT NOTE ADULT - ASSESSMENT
dm on metformin  check glu    hld on lopid  check lipid    Imp psych check b12 folate tsh  psych to follow up

## 2018-11-02 NOTE — CONSULT NOTE ADULT - SUBJECTIVE AND OBJECTIVE BOX
QUINN CAMPOS  48y  Male      Patient is a 48y old  Male who presents with a chief complaint of Pt c/o paranoid delusions in the context of people watching him; pt expressed frustration towards ineffective medication regime (02 Nov 2018 05:38)        PAST MEDICAL/SURGICAL HISTORY  PAST MEDICAL & SURGICAL HISTORY:  Paranoid schizophrenia  High blood cholesterol  Diabetes mellitus, type 2  S/P knee surgery      REVIEW OF SYSTEMS:  CONSTITUTIONAL: No fever, weight loss, or fatigue  EYES: No eye pain, visual disturbances, or discharge  ENMT:  No difficulty hearing, tinnitus, vertigo; No sinus or throat pain  NECK: No pain or stiffness  RESPIRATORY: No cough, wheezing, chills or hemoptysis; No shortness of breath  CARDIOVASCULAR: No chest pain, palpitations, dizziness, or leg swelling  GASTROINTESTINAL: No abdominal or epigastric pain. No nausea, vomiting, or hematemesis; No diarrhea or constipation. No melena or hematochezia.  GENITOURINARY: No dysuria, frequency, hematuria, or incontinence    ALLERY AND IMMUNOLOGIC: No hives or eczema    clonazePAM Tablet 1 milliGRAM(s) Oral three times a day  FLUoxetine 40 milliGRAM(s) Oral daily  lamoTRIgine 200 milliGRAM(s) Oral two times a day  lurasidone 80 milliGRAM(s) Oral daily  metFORMIN 1000 milliGRAM(s) Oral two times a day  simvastatin 20 milliGRAM(s) Oral at bedtime      T(C): 36.6 (11-02-18 @ 09:56), Max: 36.6 (11-02-18 @ 00:53)  HR: 88 (11-02-18 @ 09:56) (63 - 88)  BP: 142/92 (11-02-18 @ 09:56) (114/59 - 142/92)  RR: 18 (11-02-18 @ 09:56) (18 - 20)  SpO2: 100% (11-02-18 @ 03:20) (100% - 100%)  Wt(kg): --Vital Signs Last 24 Hrs  T(C): 36.6 (02 Nov 2018 09:56), Max: 36.6 (02 Nov 2018 00:53)  T(F): 97.8 (02 Nov 2018 09:56), Max: 97.9 (02 Nov 2018 00:53)  HR: 88 (02 Nov 2018 09:56) (63 - 88)  BP: 142/92 (02 Nov 2018 09:56) (114/59 - 142/92)  BP(mean): --  RR: 18 (02 Nov 2018 09:56) (18 - 20)  SpO2: 100% (02 Nov 2018 03:20) (100% - 100%)    PHYSICAL EXAM:  GENERAL: NAD, well-groomed, well-developed  HEAD:  Atraumatic, Normocephalic  EYES: EOMI, PERRLA, conjunctiva and sclera clear  ENMT: No tonsillar erythema, exudates, or enlargement; Moist mucous membranes, Good dentition, No lesions  NECK: Supple, No JVD, Normal thyroid  NERVOUS SYSTEM:  Alert & Oriented X3, Good concentration; Motor Strength 5/5 B/L upper and lower extremities; DTRs 2+ intact and symmetric  CHEST/LUNG: Clear to percussion bilaterally; No rales, rhonchi, wheezing, or rubs  HEART: Regular rate and rhythm; No murmurs, rubs, or gallops  ABDOMEN: Soft, Nontender, Nondistended; Bowel sounds present  EXTREMITIES:  2+ Peripheral Pulses, No clubbing, cyanosis, or edema  LYMPH: No lymphadenopathy noted  SKIN: No rashes or lesions      LABS:  CBC   11-02-18 @ 02:25  Hematcorit 43.1  Hemoglobin 14.5  Mean Cell Hemoglobin 29.5  Platelet Count-Automated 278  RBC Count 4.92  Red Cell Distrib Width 12.2  Wbc Count 10.73      BMP  11-02-18 @ 02:25  Anion Gap. Serum 13  Blood Urea Nitrogen,Serm 18  Calcium, Total Serum 10.1  Carbon Dioxide, Serum 26  Chloride, Serum 102  Creatinine, Serum 1.2  eGFR in  81  eGFR in Non Afican American 70  Gloucose, serum 131  Potassium, Serum 4.9  Sodium, Serum 141                  CMP  11-02-18 @ 02:25  Devi Aminotransferase(ALT/SGPT)29  Albumin, Serum 4.6  Alkaline Phosphatase, Serum 62  Anion Gap, Serum 13  Aspartate Aminotransferase (AST/SGOT)26  Bilirubin Total, Serum <0.2  Blood Urea Nitrogen, Serum 18  Calcium,Total Serum 10.1  Carbon Dioxide, Serum 26  Chloride, Serum 102  Creatinine, Serum 1.2  eGFR if  81  eGFR if Non African American 70  Glucose, Serum 131  Potassium, Serum 4.9  Protein Total, Serum 7.4  Sodium, Serum 141                          PT/INR      Amylase/Lipase            RADIOLOGY & ADDITIONAL TESTS:    Imaging Personally Reviewed:  [ ] YES  [ ] NO

## 2018-11-02 NOTE — ED PROVIDER NOTE - OBJECTIVE STATEMENT
47 yo m pmhx of schizoaffective disorder reports for evaluation and placement to inpatient psychiatric unit for clozapine, pt reports feeling out of control for 1 wk in duration as per pt felt like I was not in my own skin. Denies suicidal, homicidal ideations; denies auditory and visual hallucinations, CP, SOB, palpitations, n/v.

## 2018-11-02 NOTE — ED PROVIDER NOTE - PHYSICAL EXAMINATION
Physical Exam    Vital Signs: I have reviewed the initial vital signs.  Constitutional: well-nourished, appears stated age, no acute distress  Eyes: PERRLA, EOM intact, RAPD absent, no conjunctival injection, and symmetrical lids.  ENT: Neck supple with no adenopathy, moist MM.  Cardiovascular: regular rate, regular rhythm, well-perfused extremities  Respiratory: unlabored respiratory effort, clear to auscultation bilaterally  Gastrointestinal: soft, non-tender abdomen, no pulsatile mass  Musculoskeletal: supple neck, no lower extremity edema  Integumentary: warm, dry, no rash  Neurologic: awake, alert, cranial nerves II-XII grossly intact, extremities’ motor and sensory functions grossly intact  Psychiatric: A&Ox3, appropriate mood, appropriate affect

## 2018-11-02 NOTE — ED ADULT NURSE NOTE - NSIMPLEMENTINTERV_GEN_ALL_ED
Implemented All Universal Safety Interventions:  [place] to call system. Call bell, personal items and telephone within reach. Instruct patient to call for assistance. Room bathroom lighting operational. Non-slip footwear when patient is off stretcher. Physically safe environment: no spills, clutter or unnecessary equipment. Stretcher in lowest position, wheels locked, appropriate side rails in place.

## 2018-11-02 NOTE — PROGRESS NOTE BEHAVIORAL HEALTH - NSBHFUPADMSUICFT_PSY_A_CORE
had "fleeting ideas" of wanting to kill himself before previous admission, however never acted on this.

## 2018-11-02 NOTE — H&P ADULT - NSHPPHYSICALEXAM_GEN_ALL_CORE
GENERAL:  47y/o Male NAD, resting comfortably.  HEAD:  Atraumatic, Normocephalic  EYES: EOMI, PERRLA, conjunctiva and sclera clear  NECK: Supple, No JVD, no cervical lymphadenopathy, non-tender  CHEST/LUNG: Clear to auscultation bilaterally; No wheeze, rhonchi, or rales  HEART: Regular rate and rhythm; S1&S2  ABDOMEN: Soft, Nontender, Nondistended x 4 quadrants; Bowel sounds present  EXTREMITIES:   Peripheral Pulses Present, No clubbing, no cyanosis, or no edema, no calf tenderness  PSYCH: AAOx3, cooperative, appropriate  NEUROLOGY: WNL  SKIN: WNL

## 2018-11-02 NOTE — PROGRESS NOTE BEHAVIORAL HEALTH - NSBHFUPSTRENGTHS_PSY_A_CORE
Has supportive interpersonal relationships with family, friends or peers/Knowledge of medications/Cooperative with treatment/Has access to housing/residential stability/Motivated and ready for change

## 2018-11-02 NOTE — CHART NOTE - NSCHARTNOTEFT_GEN_A_CORE
Mr. Wilder Bentley is a [age] year old   male with a history of schizoaffective disorder who presented to the ED with chronic paranoid delusions that the government is after him and people are watching him through cars. He reports his medication isn't helping him and asked for a review of his medications. He states "I want to be on Clozapine". Mr. Bentley denies feeling depressed but reports feeling helpless. He denies auditory hallucinations, as well as suicidal/ homicidal ideations, intent or plan.     In the community, Mr. Bentley is resides with his wife and two children, ages fourteen and seventeen, in a private residence. He is unemployed and receives disability benefits. 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 He has a long history of mental illness and two prior [facility] admissions, including [facility] June 2018 and [place] January 2009. He has been on multiple antipsychotic medications in the past with poor response. His medical history is significant for diabetes, high blood cholesterol and knee surgery in 1997. He denies substance use. He sees his Psychiatrist, Dr. Kulkarni, once a month and his therapist, Dr. Meza, for therapy twice a week. He was voluntarily admitted to [facility] for safety, stabilization and medication management (to start Clozapine).      will continue to meet with Mr. Bentley one on one and with treatment team daily. Discharge plan is for Mr. Bentley to resume treatment with Dr. Kulkarni and Dr. Meza. Please refer to Social Work Psychosocial for additional information.

## 2018-11-02 NOTE — H&P ADULT - NSHPLABSRESULTS_GEN_ALL_CORE
14.5   10.73 )-----------( 278      ( 02 Nov 2018 02:25 )             43.1       11-02    141  |  102  |  18  ----------------------------<  131<H>  4.9   |  26  |  1.2    Ca    10.1      02 Nov 2018 02:25    TPro  7.4  /  Alb  4.6  /  TBili  <0.2  /  DBili  x   /  AST  26  /  ALT  29  /  AlkPhos  62  11-02                      Lactate Trend            CAPILLARY BLOOD GLUCOSE      POCT Blood Glucose.: 129 mg/dL (02 Nov 2018 02:41)

## 2018-11-02 NOTE — ED BEHAVIORAL HEALTH ASSESSMENT NOTE - CURRENT MEDICATION
Latuda  80 mg po daily. Lamotrigine 200 mg po q 12 hrs ,  Fluoxetine  60 mg po daily  Clonazepam 1 mg po tid    METFORMIN 1000MG PO Q 12 HRS , SIMVASTATIN 20 MG PO AT BED TIME

## 2018-11-02 NOTE — PROGRESS NOTE BEHAVIORAL HEALTH - NSBHADDHXPSYCHFT_PSY_A_CORE
two previous admissions, one in 2009 and one in June of 2018, for paranoid delusions and suicidal ideation. patient first noticed symptoms in 2008/2009. Patient has had a trial of multiple medications including haldol, Geodon, Abilify, and Zyprexa in the past which have caused adverse reactions. Patient had been maintained on Risperdal for about 8 years. Than recently switched to Latuda.

## 2018-11-02 NOTE — ED BEHAVIORAL HEALTH ASSESSMENT NOTE - HPI (INCLUDE ILLNESS QUALITY, SEVERITY, DURATION, TIMING, CONTEXT, MODIFYING FACTORS, ASSOCIATED SIGNS AND SYMPTOMS)
48 year old    , unemployed  male  who is domiciled with his family , has a long history of  mental illness ( schizoaffective  d/o )  , 2 prior psych admissions and  has been on many antipsychotic  medications  with poor response , presents with chronic  fixed paranoid delusions  that  people are watching him through cars  and government is after him.  He is very frustrated that no medications is helping him . He follows up with Dr Kulkarni  once a month and  Dr Meza  for therapy twice a week . pt states that  his psychiatrist and therapist recommended  to get admitted to start  Clozapine. He denies  feeling depress but feels hopeless at times not getting better.  He denies hearing voices. He denies suicidal / homicidal ideations intent or plan .

## 2018-11-02 NOTE — PROGRESS NOTE BEHAVIORAL HEALTH - NSBHFUPSUICPROTECT_PSY_A_CORE
Positive therapeutic relationships/Identifies reasons for living/Supportive social network or family

## 2018-11-03 LAB
CHOLEST SERPL-MCNC: 141 MG/DL — SIGNIFICANT CHANGE UP (ref 100–200)
ESTIMATED AVERAGE GLUCOSE: 157 MG/DL — HIGH (ref 68–114)
HBA1C BLD-MCNC: 7.1 % — HIGH (ref 4–5.6)
HDLC SERPL-MCNC: 38 MG/DL — LOW
LIPID PNL WITH DIRECT LDL SERPL: 68 MG/DL — SIGNIFICANT CHANGE UP (ref 4–129)
TOTAL CHOLESTEROL/HDL RATIO MEASUREMENT: 3.7 RATIO — LOW (ref 4–5.5)
TRIGL SERPL-MCNC: 259 MG/DL — HIGH (ref 10–149)
TSH SERPL-MCNC: 4.68 UIU/ML — HIGH (ref 0.27–4.2)

## 2018-11-03 RX ADMIN — LAMOTRIGINE 200 MILLIGRAM(S): 25 TABLET, ORALLY DISINTEGRATING ORAL at 21:15

## 2018-11-03 RX ADMIN — SIMVASTATIN 20 MILLIGRAM(S): 20 TABLET, FILM COATED ORAL at 21:17

## 2018-11-03 RX ADMIN — METFORMIN HYDROCHLORIDE 1000 MILLIGRAM(S): 850 TABLET ORAL at 08:50

## 2018-11-03 RX ADMIN — LAMOTRIGINE 200 MILLIGRAM(S): 25 TABLET, ORALLY DISINTEGRATING ORAL at 08:49

## 2018-11-03 RX ADMIN — METFORMIN HYDROCHLORIDE 1000 MILLIGRAM(S): 850 TABLET ORAL at 21:15

## 2018-11-03 RX ADMIN — Medication 1 MILLIGRAM(S): at 14:15

## 2018-11-03 RX ADMIN — Medication 40 MILLIGRAM(S): at 08:49

## 2018-11-04 RX ADMIN — Medication 30 MILLILITER(S): at 15:32

## 2018-11-04 RX ADMIN — METFORMIN HYDROCHLORIDE 1000 MILLIGRAM(S): 850 TABLET ORAL at 20:24

## 2018-11-04 RX ADMIN — LAMOTRIGINE 200 MILLIGRAM(S): 25 TABLET, ORALLY DISINTEGRATING ORAL at 20:24

## 2018-11-04 RX ADMIN — METFORMIN HYDROCHLORIDE 1000 MILLIGRAM(S): 850 TABLET ORAL at 08:07

## 2018-11-04 RX ADMIN — LAMOTRIGINE 200 MILLIGRAM(S): 25 TABLET, ORALLY DISINTEGRATING ORAL at 08:07

## 2018-11-04 RX ADMIN — Medication 1 MILLIGRAM(S): at 08:11

## 2018-11-04 RX ADMIN — Medication 40 MILLIGRAM(S): at 08:06

## 2018-11-04 RX ADMIN — SIMVASTATIN 20 MILLIGRAM(S): 20 TABLET, FILM COATED ORAL at 20:25

## 2018-11-05 DIAGNOSIS — E11.9 TYPE 2 DIABETES MELLITUS WITHOUT COMPLICATIONS: ICD-10-CM

## 2018-11-05 RX ORDER — LURASIDONE HYDROCHLORIDE 40 MG/1
80 TABLET ORAL DAILY
Qty: 0 | Refills: 0 | Status: DISCONTINUED | OUTPATIENT
Start: 2018-11-05 | End: 2018-11-06

## 2018-11-05 RX ADMIN — SIMVASTATIN 20 MILLIGRAM(S): 20 TABLET, FILM COATED ORAL at 21:13

## 2018-11-05 RX ADMIN — Medication 1 MILLIGRAM(S): at 17:41

## 2018-11-05 RX ADMIN — Medication 1 MILLIGRAM(S): at 10:09

## 2018-11-05 RX ADMIN — Medication 40 MILLIGRAM(S): at 08:02

## 2018-11-05 RX ADMIN — LAMOTRIGINE 200 MILLIGRAM(S): 25 TABLET, ORALLY DISINTEGRATING ORAL at 21:12

## 2018-11-05 RX ADMIN — METFORMIN HYDROCHLORIDE 1000 MILLIGRAM(S): 850 TABLET ORAL at 08:02

## 2018-11-05 RX ADMIN — LAMOTRIGINE 200 MILLIGRAM(S): 25 TABLET, ORALLY DISINTEGRATING ORAL at 08:07

## 2018-11-05 RX ADMIN — METFORMIN HYDROCHLORIDE 1000 MILLIGRAM(S): 850 TABLET ORAL at 21:13

## 2018-11-05 NOTE — PROGRESS NOTE BEHAVIORAL HEALTH - RISK ASSESSMENT
Patient at moderate risk given his paranoid delusions, history of previous [facility] admissions and history of suicidal thinking. These risks are mitigated by patients strong social support, strong therapeutic relationships, compliance with medications, engaged in treatment, good insight and judgement, and ease of access to emergency room.

## 2018-11-05 NOTE — PROGRESS NOTE BEHAVIORAL HEALTH - DETAILS
adverse effects to previous antipsychotics (restlessness, etc.), lethargy (sleep 15 hours)
adverse effects to previous antipsychotics (restlessness, etc.)
adverse effects to previous antipsychotics (restlessness, etc.)

## 2018-11-05 NOTE — PROGRESS NOTE BEHAVIORAL HEALTH - NSBHFUPINTERVALHXFT_PSY_A_CORE
Patient endorses that he has had paranoid delusions which have been worsening over the past few weeks. Patient states he feels as thought the "government" is after him and that he is constantly being watched. Patient endorses that his psychiatrist had suggested he begin Clozapine and that his wife bring him to the hospital to begin this medication. Patient reports that he feels safe in the hospital. Patient acknowledges that he has had a trial of nearly every antipsychotic in the past, most have given him adverse side effects. Patient had been maintained well on the Risperdal and than Latuda after that but reports that they both "stopped working". Patient denies any suicidal or homicidal ideation or intent. Patient denies feeling depressed. Patient denies AH/VH. Patient denies symptoms of uriah.     Collateral provided by Dr. Kulkarni, patient's psychiatrist. She states that she has been following the patient for many years. She reports that he has always dealt with these paranoid delusions however there has been a recent change from baseline. She reports that she believes the patient needs clozapine after going through trials of nearly all other antipsychotics. Given the fact that the patient has diabetes, is usually home alone for most of the day, and that his worsening delusions make him more unpredictable than usual, she feels as though beginning this medication in the in-patient unit is warranted.     Collateral from patients wife, Pilar. She states that lately the patient has not "been himself". She too mentions that the paranoid delusions have been around for years. However, she reports that her  no longer does anything at homes and just "wastes away". She endorses that the patient has been worsening from his baseline. He spends all day crying, has poor hygiene, will make phone calls all day asking people about his delusions and has "no other outlets". She also reports that they have two teenage children who are being affected by the patients paranoia, as he is frequently asking them about his delusions. She is worried about his condition and is equally concerned about beginning this new medication.
Patient seen and examined at bedside this morning, chart reviewed. Per nursing staff patient had refused all doses of his Clozapine since 11/3/18 and had provided a 72 hour notice to be discharged on the date provided as well. On evaluation, patient endorses that after taking his initial dose of Clozapine, he had slept for 15 hours and also had a lot of diarrhea. Patient reports that he spoke to others on the medication and looked into its many side effects and states that he does not feel comfortable taking this medication. Patient endorses that he would like to go back home and continue his previous medication regimen, Latuda, and follow up with a psychiatrist that his wife chooses. Patient has good insight and knowledge into his condition and treatment plan. Patient denies the previously heightened paranoid delusions. Denies AH/VH, denies suicidal and homicidal thoughts or ideations.     Speaking to patient's wife, she reports that she understands this is the patients choice and that there is not enough evidence to warrant him staying in the hospital. She endorses that she feels safe to take the patient home but would prefer he join a partial program. Her main concern is that the patient is decompensating at home, he spends all day "crying" and "pacing" and this is impacting the family. She would also like to take the patient to another psychiatrist for a second opinion. She is upset that the patient did not give Clozapine a fair trial however understand that this discussion is better suited for his outpatient treatment team.
Patient seen and examined at bedside this morning, chart reviewed. Patient endorses that he has had paranoid delusions which have been worsening over the past few weeks. Patient states he feels as thought the "government" is after him and that he is constantly being watched. Patient endorses that his psychiatrist had suggested he begin Clozapine and that his wife bring him to the hospital to begin this medication. Patient reports that he feels safe in the hospital. Patient acknowledges that he has had a trial of nearly every antipsychotic in the past, most have given him adverse side effects. Patient had been maintained well on the Risperdal and than Latuda after that but reports that they both "stopped working". Patient denies any suicidal or homicidal ideation or intent. Patient denies feeling depressed. Patient denies AH/VH. Patient denies symptoms of uriah.     Collateral provided by Dr. Kulkarni, patient's psychiatrist. She states that she has been following the patient for many years. She reports that he has always dealt with these paranoid delusions however there has been a recent change from baseline. She reports that she believes the patient needs clozapine after going through trials of nearly all other antipsychotics. Given the fact that the patient has diabetes, is usually home alone for most of the day, and that his worsening delusions make him more unpredictable than usual, she feels as though beginning this medication in the in-patient unit is warranted.     Collateral from patients wife, Pilar. She states that lately the patient has not "been himself". She too mentions that the paranoid delusions have been around for years. However, she reports that her  no longer does anything at homes and just "wastes away". She endorses that the patient has been worsening from his baseline. He spends all day crying, has poor hygiene, will make phone calls all day asking people about his delusions and has "no other outlets". She also reports that they have two teenage children who are being affected by the patients paranoia, as he is frequently asking them about his delusions. She is worried about his condition and is equally concerned about beginning this new medication.

## 2018-11-05 NOTE — CHART NOTE - NSCHARTNOTEFT_GEN_A_CORE
Social Work Note:    Patient submitted a 72 hour letter requesting discharge.  He is anticipated for discharge tomorrow.      Plan is for patient to resume outpatient treatment at [facility].

## 2018-11-05 NOTE — PROGRESS NOTE BEHAVIORAL HEALTH - SUMMARY
48 year old    , unemployed  male  who is domiciled with his family , has a long history of  mental illness ( schizoaffective  d/o )  , 2 prior psych admissions and  has been on many antipsychotic  medications  with poor response , presents with chronic  fixed paranoid delusions  that  people are watching him through cars  and government is after him.  He is very frustrated that no medications is helping him . He follows up with Dr Kulkarni  once a month and  Dr Meza  for therapy twice a week . pt states that  his psychiatrist and therapist recommended  to get admitted to start  Clozapine. He denies  feeling depress but feels hopeless at times not getting better.  He denies hearing voices. He denies suicidal / homicidal ideations intent or plan .    On reassessment, patient endorses that he is not comfortable with the new medication and would like to be discharged and restarted on his previous regimen. Patient will likely transition care through the partial program and follow up with his out-patient psychiatrist. Patient has good judgement and insight with his condition and denies any current symptoms of depression or psychosis. Will continue to monitor.
48 year old    , unemployed  male  who is domiciled with his family , has a long history of  mental illness ( schizoaffective  d/o )  , 2 prior psych admissions and  has been on many antipsychotic  medications  with poor response , presents with chronic  fixed paranoid delusions  that  people are watching him through cars  and government is after him.  He is very frustrated that no medications is helping him . He follows up with Dr Kulkarni  once a month and  Dr Meza  for therapy twice a week . pt states that  his psychiatrist and therapist recommended  to get admitted to start  Clozapine. He denies  feeling depress but feels hopeless at times not getting better.  He denies hearing voices. He denies suicidal / homicidal ideations intent or plan .
48 year old    , unemployed  male  who is domiciled with his family , has a long history of  mental illness ( schizoaffective  d/o )  , 2 prior psych admissions and  has been on many antipsychotic  medications  with poor response , presents with chronic  fixed paranoid delusions  that  people are watching him through cars  and government is after him.  He is very frustrated that no medications is helping him . He follows up with Dr Kulkarni  once a month and  Dr Meza  for therapy twice a week . pt states that  his psychiatrist and therapist recommended  to get admitted to start  Clozapine. He denies  feeling depress but feels hopeless at times not getting better.  He denies hearing voices. He denies suicidal / homicidal ideations intent or plan .

## 2018-11-05 NOTE — PROGRESS NOTE BEHAVIORAL HEALTH - CASE SUMMARY
Pt was seen and discussed with the resident. Chart reviewed. Agree with assessment and plan above. On evaluation, pt reported that he did not like clozapine as it made him sleep for too long and have diarrhea. Pt has great insight into his illness, states he knows he needs to work on the "cognitive" aspect of his mental illness. States that he has been going to therapy twice per week to address delusions. States he feels safe going home. Pt put in 72 hr notice on Sat 11/3/18, asking to go home. Pt states that he came to the hospital to be started on clozapine so now that he is not on it he can go home. Pt reports feeling safe going home. Denied depressed mood. Denied manic symptoms. States that he wants to resume Latuda as it helped him with the "mood" component. Denied A/V hallucinations. Is visible on the unit, attending groups. Denied suicidal/homicidal ideation, intent or plan.
Pt was seen and discussed with the resident. Chart reviewed. Agree with assessment and plan above. 48 year old    , unemployed  male  who is domiciled with his family , has a long history of  mental illness ( schizoaffective  d/o ) , 2 prior psych admissions and  has been on many antipsychotic  medications  with poor response , presents with chronic  fixed paranoid delusions  that  people are watching him through cars and government is after him. Pt felt unsafe outside the hospital and his paranoia has been worsening. Continue medications as above.

## 2018-11-05 NOTE — PROGRESS NOTE BEHAVIORAL HEALTH - LANGUAGE
No abnormalities noted/Normal repetition

## 2018-11-05 NOTE — PROGRESS NOTE BEHAVIORAL HEALTH - PROBLEM SELECTOR PLAN 1
- Will continue pt's outpatient Prozac 40 mg qd and Lamictal 200 mg BID  - D/C Clozapine   - Restart Latuda 80 mg QHS
- Will continue pt's outpatient Prozac 40 mg qd and lamictal 200 mg BID  - Will start clozaril titration - 25 mg qhs starting today 11/2/18 x 2 doses then increase to 25 mg BID x 2 days, etc.   - Will continue to monitor ANC and WBC  - Pt and family are aware of all potential side effects and are agreeable to do weekly blood tests for 6 months, then biweekly, etc.
- Will continue pt's outpatient Prozac 40 mg qd and lamictal 200 mg BID  - Will start clozaril titration - 25 mg qhs starting today 11/2/18 x 2 doses then increase to 25 mg BID x 2 days, etc.   - Will continue to monitor ANC and WBC  - Pt and family are aware of all potential side effects and are agreeable to do weekly blood tests for 6 months, then biweekly, etc.

## 2018-11-05 NOTE — PROGRESS NOTE BEHAVIORAL HEALTH - NSBHADMITIPREASONDETAILS_PSY_A_CORE FT
not caring for self, far from baseline, paranoid delusions.

## 2018-11-05 NOTE — PROGRESS NOTE BEHAVIORAL HEALTH - NSBHCHARTREVIEWVS_PSY_A_CORE FT
Vital Signs Last 24 Hrs  T(C): 36.7 (05 Nov 2018 10:30), Max: 37 (04 Nov 2018 11:30)  T(F): 98 (05 Nov 2018 10:30), Max: 98.6 (04 Nov 2018 11:30)  HR: 85 (05 Nov 2018 10:30) (71 - 95)  BP: 113/78 (05 Nov 2018 10:30) (113/65 - 139/87)  BP(mean): --  RR: 18 (05 Nov 2018 10:30) (18 - 18)  SpO2: --
Vital Signs Last 24 Hrs  T(C): 36.6 (02 Nov 2018 09:56), Max: 36.6 (02 Nov 2018 00:53)  T(F): 97.8 (02 Nov 2018 09:56), Max: 97.9 (02 Nov 2018 00:53)  HR: 88 (02 Nov 2018 09:56) (63 - 88)  BP: 142/92 (02 Nov 2018 09:56) (114/59 - 142/92)  BP(mean): --  RR: 18 (02 Nov 2018 09:56) (18 - 20)  SpO2: 100% (02 Nov 2018 03:20) (100% - 100%)

## 2018-11-05 NOTE — PROGRESS NOTE BEHAVIORAL HEALTH - NSBHADMITIPOBSFT_PSY_A_CORE
patient engaged in treatment and agrees to safety plan

## 2018-11-05 NOTE — PROGRESS NOTE BEHAVIORAL HEALTH - NSBHCHARTREVIEWLAB_PSY_A_CORE FT
Comprehensive Metabolic Panel (11.02.18 @ 02:25)    Sodium, Serum: 141 mmol/L    Potassium, Serum: 4.9: Hemolyzed. Interpret with caution mmol/L    Chloride, Serum: 102 mmol/L    Carbon Dioxide, Serum: 26 mmol/L    Anion Gap, Serum: 13 mmol/L    Blood Urea Nitrogen, Serum: 18 mg/dL    Creatinine, Serum: 1.2 mg/dL    Glucose, Serum: 131 mg/dL    Calcium, Total Serum: 10.1 mg/dL    Protein Total, Serum: 7.4 g/dL    Albumin, Serum: 4.6 g/dL    Bilirubin Total, Serum: <0.2 mg/dL    Alkaline Phosphatase, Serum: 62 U/L    Aspartate Aminotransferase (AST/SGOT): 26: Hemolyzed. Interpret with caution U/L    Alanine Aminotransferase (ALT/SGPT): 29: Hemolyzed. Interpret with caution U/L    eGFR if Non : 70: Interpretative comment  The units for eGFR are ml/min/1.73m2 (normalized body surface area). The  eGFR is calculated from a serum creatinine using the CKD-EPI equation.  Other variables required for calculation are race, age and sex. Among  patients with chronic kidney disease (CKD), the eGFR is useful in  determining the stage of disease according to KDOQI CKD classification.  All eGFR results are reported numerically with the following  interpretation.          GFR                    With                 Without     (ml/min/1.73 m2)    Kidney Damage       Kidney Damage        >= 90                    Stage 1                     Normal        60-89                    Stage 2                     Decreased GFR        30-59     Stage 3                     Stage 3        15-29                    Stage 4                     Stage 4        < 15                      Stage 5                     Stage 5  Each stage of CKD assumes that the associated GFR level has been in  effect for at least 3 months. Determination of stages one and two (with  eGFR > 59 ml/min/m2) requires estimation of kidney damage for at least 3  months as defined by structural or functional abnormalities.  Limitations: All estimates of GFR will be less accurate for patients at  extremes of muscle mass (including but not limited to frail elderly,  critically ill, or cancer patients), those with unusual diets, and those  with conditions associated with reduced secretion or extrarenal  elimination of creatinine. The eGFR equation is not recommended for use  in patients with unstable creatinine levels. mL/min/1.73M2    eGFR if African American: 81 mL/min/1.73M2    Complete Blood Count + Automated Diff (11.02.18 @ 02:25)    Auto Basophil #: 0.08 K/uL    Auto Basophil %: 0.7 %
11-02    141  |  102  |  18  ----------------------------<  131<H>  4.9   |  26  |  1.2    Ca    10.1      02 Nov 2018 02:25    TPro  7.4  /  Alb  4.6  /  TBili  <0.2  /  DBili  x   /  AST  26  /  ALT  29  /  AlkPhos  62  11-02

## 2018-11-05 NOTE — CONSULT NOTE ADULT - SUBJECTIVE AND OBJECTIVE BOX
QUINN CAMPOS  48y  Male      Patient is a 48y old  Male who presents with a chief complaint of Pt c/o paranoid delusions in the context of people watching him; pt expressed frustration towards ineffective medication regime (02 Nov 2018 05:38)    HPI:  · HPI (include illness quality, severity, duration, timing, context, modifying factors, associated signs and symptoms)	48 year old    , unemployed  male  who is domiciled with his family , has a long history of  mental illness ( schizoaffective  d/o )  , 2 prior psych admissions and  has been on many antipsychotic  medications  with poor response , presents with chronic  fixed paranoid delusions  that  people are watching him through cars  and government is after him.  He is very frustrated that no medications is helping him . He follows up with Dr Kulkarni  once a month and  Dr Meza  for therapy twice a week . pt states that  his psychiatrist and therapist recommended  to get admitted to start  Clozapine. He denies  feeling depress but feels hopeless at times not getting better.  He denies hearing voices. He denies suicidal / homicidal ideations intent or plan . (02 Nov 2018 06:02)    INTERVAL HPI/OVERNIGHT EVENTS:  HEALTH ISSUES - PROBLEM Dx:  Schizophrenia, paranoid type  Paranoid schizophrenia: Paranoid schizophrenia  Diabetes mellitus, type 2: Diabetes mellitus, type 2  High blood cholesterol: High blood cholesterol  Delusional disorder  Paranoid personality disorder  Schizoaffective disorder, bipolar type        PAST MEDICAL & SURGICAL HISTORY:  Paranoid schizophrenia  High blood cholesterol  Diabetes mellitus, type 2  S/P knee surgery    FAMILY HISTORY:  No pertinent family history in first degree relatives    bismuth subsalicylate Liquid 30 milliLiter(s) Oral every 6 hours PRN  clonazePAM Tablet 1 milliGRAM(s) Oral three times a day PRN  cloZAPine   Oral   cloZAPine 25 milliGRAM(s) Oral two times a day  FLUoxetine 40 milliGRAM(s) Oral daily  lamoTRIgine 200 milliGRAM(s) Oral two times a day  metFORMIN 1000 milliGRAM(s) Oral two times a day  simvastatin 20 milliGRAM(s) Oral at bedtime      REVIEW OF SYSTEMS:  CONSTITUTIONAL: No fever, weight loss, or fatigue  EYES: No eye pain, visual disturbances, or discharge  ENMT:  No difficulty hearing, tinnitus, vertigo; No sinus or throat pain  NECK: No pain or stiffness  BREASTS: No pain, masses, or nipple discharge  RESPIRATORY: No cough, wheezing, chills or hemoptysis; No shortness of breath  CARDIOVASCULAR: No chest pain, palpitations, dizziness, or leg swelling  GASTROINTESTINAL: No abdominal or epigastric pain. No nausea, vomiting, or hematemesis; No diarrhea or constipation. No melena or hematochezia.  GENITOURINARY: No dysuria, frequency, hematuria, or incontinence  NEUROLOGICAL: No headaches, memory loss, loss of strength, numbness, or tremors  SKIN: No itching, burning, rashes, or lesions   LYMPH NODES: No enlarged glands  ENDOCRINE: No heat or cold intolerance; No hair loss  MUSCULOSKELETAL: No joint pain or swelling; No muscle, back, or extremity pain  PSYCHIATRIC: as per hpi and previous psych history  HEME/LYMPH: No easy bruising, or bleeding gums  ALLERY AND IMMUNOLOGIC: No hives or eczema    T(C): 36.4 (11-05-18 @ 06:28), Max: 37 (11-04-18 @ 11:30)  HR: 71 (11-05-18 @ 06:28) (71 - 95)  BP: 117/78 (11-05-18 @ 06:28) (113/65 - 139/87)  RR: 18 (11-05-18 @ 06:28) (18 - 18)  SpO2: --  Wt(kg): --Vital Signs Last 24 Hrs  T(C): 36.4 (05 Nov 2018 06:28), Max: 37 (04 Nov 2018 11:30)  T(F): 97.6 (05 Nov 2018 06:28), Max: 98.6 (04 Nov 2018 11:30)  HR: 71 (05 Nov 2018 06:28) (71 - 95)  BP: 117/78 (05 Nov 2018 06:28) (113/65 - 139/87)  BP(mean): --  RR: 18 (05 Nov 2018 06:28) (18 - 18)  SpO2: --    PHYSICAL EXAM:  GENERAL: NAD,well-developed  HEAD:  Atraumatic, Normocephalic  EYES: EOMI, PERRLA, conjunctiva and sclera clear  ENMT: No tonsillar erythema, exudates, or enlargement; Moist mucous membranes, Good dentition, No lesions  NECK: Supple, No JVD, Normal thyroid  NERVOUS SYSTEM:  Alert & Oriented X3, Good concentration; Motor Strength 5/5 B/L upper and lower extremities; DTRs 2+ intact and symmetric  CHEST/LUNG: Clear bs bilaterally; No rales, rhonchi, wheezing  HEART: Regular rate and rhythm; No murmurs, rubs, or gallops  ABDOMEN: Soft, Nontender, Nondistended; Bowel sounds present  EXTREMITIES:  2+ Peripheral Pulses, No clubbing, cyanosis, or edema  LYMPH: No lymphadenopathy noted  SKIN: No rashes or lesions  Neuro: alert  no focal deficits    Consultant(s) Notes Reviewed:  [x ] YES  [ ] NO  Care Discussed with Consultants/Other Providers [ x] YES  [ ] NO    LABS:              CAPILLARY BLOOD GLUCOSE                RADIOLOGY & ADDITIONAL TESTS:    Imaging Personally Reviewed:  [ ] YES  [ ] NO

## 2018-11-05 NOTE — PROGRESS NOTE BEHAVIORAL HEALTH - NSBHFUPINTERVALCCFT_PSY_A_CORE
"I am here because I need to start Clozaril" He stated clozaril made him sleep for 24 hrs.
"I am here because I need to start Clozaril"
"I would like to be discharged and restart my old medication"

## 2018-11-06 VITALS
SYSTOLIC BLOOD PRESSURE: 110 MMHG | HEART RATE: 75 BPM | DIASTOLIC BLOOD PRESSURE: 60 MMHG | TEMPERATURE: 97 F | RESPIRATION RATE: 18 BRPM

## 2018-11-06 RX ORDER — SIMVASTATIN 20 MG/1
1 TABLET, FILM COATED ORAL
Qty: 0 | Refills: 0 | DISCHARGE
Start: 2018-11-06

## 2018-11-06 RX ORDER — LAMOTRIGINE 25 MG/1
1 TABLET, ORALLY DISINTEGRATING ORAL
Qty: 0 | Refills: 0 | DISCHARGE
Start: 2018-11-06

## 2018-11-06 RX ORDER — CLONAZEPAM 1 MG
1 TABLET ORAL
Qty: 0 | Refills: 0 | DISCHARGE
Start: 2018-11-06

## 2018-11-06 RX ORDER — METFORMIN HYDROCHLORIDE 850 MG/1
1 TABLET ORAL
Qty: 0 | Refills: 0 | DISCHARGE
Start: 2018-11-06

## 2018-11-06 RX ORDER — FLUOXETINE HCL 10 MG
1 CAPSULE ORAL
Qty: 0 | Refills: 0 | DISCHARGE
Start: 2018-11-06

## 2018-11-06 RX ORDER — LURASIDONE HYDROCHLORIDE 40 MG/1
1 TABLET ORAL
Qty: 0 | Refills: 0 | DISCHARGE
Start: 2018-11-06

## 2018-11-06 RX ADMIN — METFORMIN HYDROCHLORIDE 1000 MILLIGRAM(S): 850 TABLET ORAL at 08:03

## 2018-11-06 RX ADMIN — LURASIDONE HYDROCHLORIDE 80 MILLIGRAM(S): 40 TABLET ORAL at 08:04

## 2018-11-06 RX ADMIN — Medication 1 MILLIGRAM(S): at 05:18

## 2018-11-06 RX ADMIN — Medication 40 MILLIGRAM(S): at 08:03

## 2018-11-06 RX ADMIN — LAMOTRIGINE 200 MILLIGRAM(S): 25 TABLET, ORALLY DISINTEGRATING ORAL at 08:03

## 2018-11-06 NOTE — DISCHARGE NOTE BEHAVIORAL HEALTH - PAST PSYCHIATRIC HISTORY
two previous admissions, one in 2009 and one in June of 2018, for paranoid delusions and suicidal ideation. patient first noticed symptoms in 2008/2009. Patient has had a trial of multiple medications including haldol, Geodon, Abilify, and Zyprexa in the past which have caused adverse reactions. Patient had been maintained on Risperdal for about 8 years. Than recently switched to Latuda.  denies any substance use  Outpatient meds: Latuda  80 mg po daily. Lamotrigine 200 mg po q 12 hrs ,  Fluoxetine  60 mg po daily  Clonazepam 1 mg po tid

## 2018-11-06 NOTE — DISCHARGE NOTE BEHAVIORAL HEALTH - NSBHDCRESPONSEFT_PSY_A_CORE
Pt has made significant progress over the course of hospitalization. With continuous psychotherapy from the treatment team and the medications, patient reports feeling better, sleeping and eating well. Thought process and insight improved. Pt was calm and cooperative and was in good behavioral control. Patient denied any suicidal or homicidal ideations. Patient denied any auditory or visual hallucinations. Patient denied any presecretory thoughts and denied any paranoid delusions.

## 2018-11-06 NOTE — DISCHARGE NOTE BEHAVIORAL HEALTH - NSBHDCMEDSFT_PSY_A_CORE
Patient was started on Clozaril 25 mg with plans to be titrated upward while monitoring response to medication and side effect profile. Pt refused medication after initial dose, reporting adverse side effects, including being extremely lethargic and GI distress. Patient has good insight and showed good judgement, submitting a 72 hour notice and wanting to be restarted on his home medications. Patient was restarted on his Latuda 80 mg qhs. Patient was compliant with the medications, denied any side-effects of the medications, and showed an effective response.

## 2018-11-06 NOTE — PROGRESS NOTE ADULT - SUBJECTIVE AND OBJECTIVE BOX
pt stable alert in NAD  no new complaints    SCHIZOAFFECTIVE DISORDER  ^MED EVAL  No pertinent family history in first degree relatives  Handoff  MEWS Score  Paranoid schizophrenia  High blood cholesterol  Diabetes mellitus, type 2  Schizoaffective disorder, bipolar type  Type 2 diabetes mellitus without complication, without long-term current use of insulin  Schizophrenia, paranoid type  Paranoid schizophrenia  Diabetes mellitus, type 2  High blood cholesterol  Delusional disorder  Paranoid personality disorder  Schizoaffective disorder, bipolar type  S/P knee surgery  No significant past surgical history  MED EVAL  90+    HEALTH ISSUES - PROBLEM Dx:  Type 2 diabetes mellitus without complication, without long-term current use of insulin: Type 2 diabetes mellitus without complication, without long-term current use of insulin  Schizophrenia, paranoid type  Paranoid schizophrenia: Paranoid schizophrenia  Diabetes mellitus, type 2: Diabetes mellitus, type 2  High blood cholesterol: High blood cholesterol  Delusional disorder  Paranoid personality disorder  Schizoaffective disorder, bipolar type        PAST MEDICAL & SURGICAL HISTORY:  Paranoid schizophrenia  High blood cholesterol  Diabetes mellitus, type 2  S/P knee surgery    No Known Allergies      FAMILY HISTORY:  No pertinent family history in first degree relatives      bismuth subsalicylate Liquid 30 milliLiter(s) Oral every 6 hours PRN  clonazePAM Tablet 1 milliGRAM(s) Oral three times a day PRN  FLUoxetine 40 milliGRAM(s) Oral daily  lamoTRIgine 200 milliGRAM(s) Oral two times a day  lurasidone 80 milliGRAM(s) Oral daily  metFORMIN 1000 milliGRAM(s) Oral two times a day  simvastatin 20 milliGRAM(s) Oral at bedtime      T(C): 36.2 (11-06-18 @ 06:43), Max: 36.7 (11-05-18 @ 10:30)  HR: 81 (11-06-18 @ 06:43) (81 - 85)  BP: 120/71 (11-06-18 @ 06:43) (113/78 - 134/89)  RR: 18 (11-06-18 @ 06:43) (18 - 18)  SpO2: --    PE;  general: alert no avcute isseis    Lungs:    Heart:    EXT:    Neuro: no deficist                          CAPILLARY BLOOD GLUCOSE

## 2018-11-06 NOTE — DISCHARGE NOTE BEHAVIORAL HEALTH - HPI (INCLUDE ILLNESS QUALITY, SEVERITY, DURATION, TIMING, CONTEXT, MODIFYING FACTORS, ASSOCIATED SIGNS AND SYMPTOMS)
48 year old    , unemployed  male  who is domiciled with his family , has a long history of  mental illness ( schizoaffective  d/o )  , 2 prior psych admissions and  has been on many antipsychotic  medications  with poor response , presents with chronic  fixed paranoid delusions  that  people are watching him through cars  and government is after him.  He is very frustrated that no medications is helping him . He follows up with Dr Kulkarni  once a month and  Dr Meza  for therapy twice a week . pt states that  his psychiatrist and therapist recommended  to get admitted to start  Clozapine. He denies  feeling depress but feels hopeless at times not getting better.  He denies hearing voices. He denies suicidal / homicidal ideations intent or plan 48 year old    , unemployed  male  who is domiciled with his family , has a long history of  mental illness ( schizoaffective  d/o )  , 2 prior psych admissions and  has been on many antipsychotic  medications  with poor response , presents with chronic  fixed paranoid delusions  that  people are watching him through cars  and government is after him.  He is very frustrated that no medications is helping him . He follows up with Dr Kulkarni  once a month and  Dr Meza  for therapy twice a week . pt states that  his psychiatrist and therapist recommended  to get admitted to start  Clozapine. He denies  feeling depress but feels hopeless at times not getting better.  He denies hearing voices. He denies suicidal / homicidal ideations intent or plan    Hospital Course:   Patient was admitted for further psychiatric stabilization. The patient was seen on a daily basis by the treatment team that consisted of an attending psychiatrist, resident psychiatrist, mental health nurse, and .     Patient was started on Clozaril 25 mg with plans to be titrated upward while monitoring response to medication and side effect profile. Pt refused medication after initial dose, reporting adverse side effects, including being extremely lethargic and GI distress. Patient has good insight and showed good judgement, submitting a 72 hour notice and wanting to be restarted on his home medications. Patient was restarted on his Latuda 80 mg qhs. Patient was compliant with the medications, denied any side-effects of the medications, and showed an effective response. Patient was encouraged to take part in the group therapy sessions. Pt has made significant progress over the course of hospitalization. With continuous psychotherapy from the treatment team and the medications, patient reports feeling better, sleeping and eating well. Thought process and insight improved. Pt was calm and cooperative and was in good behavioral control. Patient denied any suicidal or homicidal ideations. Patient denied any auditory or visual hallucinations. Patient denied any presecretory thoughts and denied any paranoid delusions. Pt was evaluated by treatment team, pt is stable for discharge and patient shows no imminent danger to self, others or property at this time. Pt understands and agrees with treatment plan and following up with outpatient partial program. Psychoeducation provided regarding diagnosis, medications, treatment and follow up. Risks, benefits, alternatives discussed, all questions and concerns addressed and answered. Reviewed crisis intervention with verbalized understanding. Pt was also future-oriented, looking forward to continuing care in the partial program. 48 year old    , unemployed  male  who is domiciled with his family , has a long history of  mental illness ( schizoaffective  d/o )  , 2 prior psych admissions and  has been on many antipsychotic  medications  with poor response , presents with chronic  fixed paranoid delusions  that  people are watching him through cars  and government is after him.  He is very frustrated that no medications is helping him . He follows up with Dr Kulkarni  once a month and  Dr Meza  for therapy twice a week . pt states that  his psychiatrist and therapist recommended  to get admitted to start  Clozapine. He denies  feeling depress but feels hopeless at times not getting better.  He denies hearing voices. He denies suicidal / homicidal ideations intent or plan    Hospital Course:   Patient was admitted for further psychiatric stabilization. The patient was seen on a daily basis by the treatment team that consisted of an attending psychiatrist, resident psychiatrist, mental health nurse, and .     Patient was started on Clozaril 25 mg with plans to be titrated upward while monitoring response to medication and side effect profile. Pt refused medication after initial dose, reporting adverse side effects, including being extremely lethargic and GI distress. Patient has good insight and showed good judgement, submitting a 72 hour notice and wanting to be restarted on his home medications. Patient was restarted on his Latuda 80 mg qhs. Patient was compliant with the medications, denied any side-effects of the medications, and showed an effective response. Patient was encouraged to take part in the group therapy sessions. Pt has made significant progress over the course of hospitalization. With continuous psychotherapy from the treatment team and the medications, patient reports feeling better, sleeping and eating well. Thought process and insight improved. Pt was calm and cooperative and was in good behavioral control. Patient denied any suicidal or homicidal ideations. Patient denied any auditory or visual hallucinations. Patient denied any presecretory thoughts and denied any paranoid delusions. Pt was evaluated by treatment team, pt is stable for discharge and patient shows no imminent danger to self, others or property at this time. Pt understands and agrees with treatment plan and following up with outpatient partial program. Psychoeducation provided regarding diagnosis, medications, treatment and follow up. Risks, benefits, alternatives discussed, all questions and concerns addressed and answered. Reviewed crisis intervention with verbalized understanding. Pt was also future-oriented, looking forward to continuing care in the partial program. Pt's wife felt comfortable picking up the patient and taking him home, did not have safety concerns.

## 2018-11-06 NOTE — CHART NOTE - NSCHARTNOTEFT_GEN_A_CORE
Social Work Discharge Note:    Patient is for discharge today.   He is alert and oriented x3.  Mood is improved.  Anxiety has decreased.  Insight and judgment have improved.  Suicidal / homicidal ideation denied.      Patient will be discharged to his home in [place].  Plan is for referral to [facility].  He is aware and agreeable to same.      Patient is aware and agreeable to discharge today.

## 2018-11-06 NOTE — DISCHARGE NOTE BEHAVIORAL HEALTH - MEDICATION SUMMARY - MEDICATIONS TO TAKE
I will START or STAY ON the medications listed below when I get home from the hospital:    lamoTRIgine 200 mg oral tablet  -- 1 tab(s) by mouth 2 times a day  -- Indication: For SCHIZOAFFECTIVE DISORDER    clonazePAM 1 mg oral tablet  -- 1 tab(s) by mouth 3 times a day, As needed, anxiety  -- Indication: For Anxiety    FLUoxetine 40 mg oral capsule  -- 1 cap(s) by mouth once a day  -- Indication: For SCHIZOAFFECTIVE DISORDER    metFORMIN 1000 mg oral tablet  -- 1 tab(s) by mouth 2 times a day  -- Indication: For Diabetes mellitus, type 2    simvastatin 20 mg oral tablet  -- 1 tab(s) by mouth once a day (at bedtime)  -- Indication: For High blood cholesterol    lurasidone 80 mg oral tablet  -- 1 tab(s) by mouth once a day  -- Indication: For SCHIZOAFFECTIVE DISORDER

## 2018-11-09 DIAGNOSIS — E78.00 PURE HYPERCHOLESTEROLEMIA, UNSPECIFIED: ICD-10-CM

## 2018-11-09 DIAGNOSIS — F60.0 PARANOID PERSONALITY DISORDER: ICD-10-CM

## 2018-11-09 DIAGNOSIS — Z79.84 LONG TERM (CURRENT) USE OF ORAL HYPOGLYCEMIC DRUGS: ICD-10-CM

## 2018-11-09 DIAGNOSIS — F22 DELUSIONAL DISORDERS: ICD-10-CM

## 2018-11-09 DIAGNOSIS — E11.9 TYPE 2 DIABETES MELLITUS WITHOUT COMPLICATIONS: ICD-10-CM

## 2018-11-09 DIAGNOSIS — F25.0 SCHIZOAFFECTIVE DISORDER, BIPOLAR TYPE: ICD-10-CM

## 2018-12-18 ENCOUNTER — OUTPATIENT (OUTPATIENT)
Dept: OUTPATIENT SERVICES | Facility: HOSPITAL | Age: 48
LOS: 1 days | Discharge: HOME | End: 2018-12-18

## 2018-12-18 DIAGNOSIS — F25.0 SCHIZOAFFECTIVE DISORDER, BIPOLAR TYPE: ICD-10-CM

## 2018-12-18 DIAGNOSIS — Z98.890 OTHER SPECIFIED POSTPROCEDURAL STATES: Chronic | ICD-10-CM

## 2019-05-23 ENCOUNTER — OUTPATIENT (OUTPATIENT)
Dept: OUTPATIENT SERVICES | Facility: HOSPITAL | Age: 49
LOS: 1 days | Discharge: HOME | End: 2019-05-23

## 2019-05-23 DIAGNOSIS — R78.79 FINDING OF ABNORMAL LEVEL OF HEAVY METALS IN BLOOD: ICD-10-CM

## 2019-05-23 DIAGNOSIS — Z98.890 OTHER SPECIFIED POSTPROCEDURAL STATES: Chronic | ICD-10-CM

## 2019-06-05 ENCOUNTER — APPOINTMENT (OUTPATIENT)
Dept: NEUROLOGY | Facility: CLINIC | Age: 49
End: 2019-06-05
Payer: MEDICARE

## 2019-06-05 VITALS
BODY MASS INDEX: 32.9 KG/M2 | DIASTOLIC BLOOD PRESSURE: 77 MMHG | HEIGHT: 71 IN | HEART RATE: 76 BPM | WEIGHT: 235 LBS | SYSTOLIC BLOOD PRESSURE: 111 MMHG

## 2019-06-05 DIAGNOSIS — Z83.3 FAMILY HISTORY OF DIABETES MELLITUS: ICD-10-CM

## 2019-06-05 DIAGNOSIS — Z82.49 FAMILY HISTORY OF ISCHEMIC HEART DISEASE AND OTHER DISEASES OF THE CIRCULATORY SYSTEM: ICD-10-CM

## 2019-06-05 DIAGNOSIS — R29.6 REPEATED FALLS: ICD-10-CM

## 2019-06-05 DIAGNOSIS — F25.9 SCHIZOAFFECTIVE DISORDER, UNSPECIFIED: ICD-10-CM

## 2019-06-05 DIAGNOSIS — E78.5 HYPERLIPIDEMIA, UNSPECIFIED: ICD-10-CM

## 2019-06-05 DIAGNOSIS — Z87.891 PERSONAL HISTORY OF NICOTINE DEPENDENCE: ICD-10-CM

## 2019-06-05 DIAGNOSIS — Z78.9 OTHER SPECIFIED HEALTH STATUS: ICD-10-CM

## 2019-06-05 DIAGNOSIS — R25.1 TREMOR, UNSPECIFIED: ICD-10-CM

## 2019-06-05 DIAGNOSIS — G21.19 OTHER DRUG INDUCED SECONDARY PARKINSONISM: ICD-10-CM

## 2019-06-05 DIAGNOSIS — F22 DELUSIONAL DISORDERS: ICD-10-CM

## 2019-06-05 DIAGNOSIS — E11.9 TYPE 2 DIABETES MELLITUS W/OUT COMPLICATIONS: ICD-10-CM

## 2019-06-05 PROCEDURE — 99203 OFFICE O/P NEW LOW 30 MIN: CPT

## 2019-06-05 RX ORDER — SIMVASTATIN 40 MG/1
40 TABLET, FILM COATED ORAL DAILY
Refills: 0 | Status: ACTIVE | COMMUNITY

## 2019-06-05 RX ORDER — FLUOXETINE HYDROCHLORIDE 60 MG/1
60 TABLET ORAL DAILY
Refills: 0 | Status: ACTIVE | COMMUNITY

## 2019-06-05 RX ORDER — METFORMIN HYDROCHLORIDE 1000 MG/1
1000 TABLET, COATED ORAL
Refills: 0 | Status: ACTIVE | COMMUNITY

## 2019-06-05 RX ORDER — CLONAZEPAM 1 MG/1
1 TABLET ORAL EVERY 8 HOURS
Refills: 0 | Status: ACTIVE | COMMUNITY

## 2019-06-05 RX ORDER — LAMOTRIGINE 200 MG/1
200 TABLET ORAL TWICE DAILY
Refills: 0 | Status: ACTIVE | COMMUNITY

## 2019-06-05 RX ORDER — LURASIDONE HYDROCHLORIDE 80 MG/1
80 TABLET, FILM COATED ORAL DAILY
Refills: 0 | Status: ACTIVE | COMMUNITY

## 2019-06-05 NOTE — ASSESSMENT
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  He will check with psychiatrist regarding option to decreased dose.  He doesn't want to go back on risperidone.  There are other atypicals but defer to psychiatry.  Other option would be to add on low dose of carbidopa-levodopa though that could aggravate psychosis.

## 2019-06-05 NOTE — REVIEW OF SYSTEMS
[Fever] : fever [Recent Weight Gain (___ Lbs)] : recent [unfilled] ~Ulb weight gain [Sleep Disturbances] : sleep disturbances [Anxiety] : anxiety [Change In Personality] : personality change [Depression] : depression [Emotional Problems] : emotional problems [Confused or Disoriented] : confusion [Decr. Concentrating Ability] : decreased concentrating ability [Memory Lapses or Loss] : memory loss [Repeating Questions] : repeated questioning about recent events [Changed Thought Patterns] : changed thought patterns [Poor Coordination] : poor coordination [Difficulties in Speech] : speech difficulties [Difficulty Walking] : difficulty walking [Chills] : no chills [Recent Weight Loss (___ Lbs)] : no recent weight loss [Suicidal] : not suicidal [Difficulty with Language] : no ~M difficulty with language [Facial Weakness] : no facial weakness [Arm Weakness] : no arm weakness [Hand Weakness] : no hand weakness [Leg Weakness] : no leg weakness [Difficulty Writing] : no difficulty writing [Numbness] : no numbness [Tingling] : no tingling [Seizures] : no convulsions [Dizziness] : no dizziness [Fainting] : no fainting [Lightheadedness] : no lightheadedness [Vertigo] : no vertigo [Eye Pain] : no eye pain [Eyesight Problems] : no eyesight problems [Earache] : no earache [Loss Of Hearing] : no hearing loss [Heart Rate Is Fast] : the heart rate was not fast [Heart Rate Is Slow] : the heart rate was not slow [Palpitations] : no palpitations [Wheezing] : no wheezing [Cough] : no cough [Shortness Of Breath] : no shortness of breath [Vomiting] : no vomiting [Constipation] : no constipation [Diarrhea] : no diarrhea [Incontinence] : no incontinence [Dysuria] : no dysuria [Joint Pain] : no joint pain [Arthralgias] : no arthralgias [Nocturia] : no nocturia [Skin Lesions] : no skin lesions [Joint Stiffness] : no joint stiffness [Joint Swelling] : no joint swelling [Itching] : no itching [Easy Bleeding] : no tendency for easy bleeding [Swollen Glands] : no swollen glands [Easy Bruising] : no tendency for easy bruising [Swollen Glands In The Neck] : no swollen glands in the neck

## 2019-06-05 NOTE — HISTORY OF PRESENT ILLNESS
[FreeTextEntry1] : Pt is 48 yoRH male presenting for evaluation of loss of balance and memory problems and shaking and loss of volume of speech.  Gradual progression over a 6-12 months.  Tremors come when sitting eating a meal.  Affect is lost per wife.  Loss of interest per wife.  Falls up and down stairs.  Today kneeling down fell over and can't get up easily.  When gets dressed in am loses balance putting on underwear.  Ambition and loss of interest decreased.  Thoughts are obsessive.   \par \par States mother, two sisters and uncle had similar symptoms.  Paranoid schizophrenia in uncle.  Going to see PCP Dr. Platt for blood work, and forgot location.

## 2019-07-15 ENCOUNTER — EMERGENCY (EMERGENCY)
Facility: HOSPITAL | Age: 49
LOS: 0 days | Discharge: HOME | End: 2019-07-15
Attending: EMERGENCY MEDICINE | Admitting: EMERGENCY MEDICINE
Payer: MEDICARE

## 2019-07-15 VITALS
RESPIRATION RATE: 18 BRPM | HEART RATE: 82 BPM | DIASTOLIC BLOOD PRESSURE: 87 MMHG | OXYGEN SATURATION: 100 % | TEMPERATURE: 97 F | SYSTOLIC BLOOD PRESSURE: 123 MMHG | WEIGHT: 235.01 LBS | HEIGHT: 70 IN

## 2019-07-15 DIAGNOSIS — K04.7 PERIAPICAL ABSCESS WITHOUT SINUS: ICD-10-CM

## 2019-07-15 DIAGNOSIS — R22.0 LOCALIZED SWELLING, MASS AND LUMP, HEAD: ICD-10-CM

## 2019-07-15 DIAGNOSIS — Z79.84 LONG TERM (CURRENT) USE OF ORAL HYPOGLYCEMIC DRUGS: ICD-10-CM

## 2019-07-15 DIAGNOSIS — E78.5 HYPERLIPIDEMIA, UNSPECIFIED: ICD-10-CM

## 2019-07-15 DIAGNOSIS — Z98.890 OTHER SPECIFIED POSTPROCEDURAL STATES: Chronic | ICD-10-CM

## 2019-07-15 DIAGNOSIS — E11.9 TYPE 2 DIABETES MELLITUS WITHOUT COMPLICATIONS: ICD-10-CM

## 2019-07-15 DIAGNOSIS — Z79.899 OTHER LONG TERM (CURRENT) DRUG THERAPY: ICD-10-CM

## 2019-07-15 DIAGNOSIS — F20.0 PARANOID SCHIZOPHRENIA: ICD-10-CM

## 2019-07-15 DIAGNOSIS — E78.00 PURE HYPERCHOLESTEROLEMIA, UNSPECIFIED: ICD-10-CM

## 2019-07-15 PROCEDURE — 99283 EMERGENCY DEPT VISIT LOW MDM: CPT

## 2019-07-15 RX ORDER — IBUPROFEN 200 MG
1 TABLET ORAL
Qty: 28 | Refills: 0
Start: 2019-07-15 | End: 2019-07-21

## 2019-07-15 RX ORDER — AMOXICILLIN 250 MG/5ML
1 SUSPENSION, RECONSTITUTED, ORAL (ML) ORAL
Qty: 30 | Refills: 0
Start: 2019-07-15 | End: 2019-07-24

## 2019-07-15 NOTE — ED PROVIDER NOTE - NSFOLLOWUPCLINICS_GEN_ALL_ED_FT
[facility]  Dental  [address] [place] [id number]  Phone: [phone]  Fax:   Follow Up Time:

## 2019-07-15 NOTE — ED PROVIDER NOTE - OBJECTIVE STATEMENT
49 yo M with PMHx of DM, HLD, and schizoaffective disorder presents to the ED c/o right lower facial swelling that started tonight. Pt thinks he might be getting dental infection. He denies taking medication to improve symptoms. He denies other complaints. Denies fever, chills, dysphagia.

## 2019-07-15 NOTE — ED PROVIDER NOTE - ATTENDING CONTRIBUTION TO CARE
I personally evaluated the patient. I reviewed the Resident’s or Physician Assistant’s note (as assigned above), and agree with the findings and plan except as documented in my note.  Chart reviewed. Presents with right facial swelling tonight. Exam shows +gingival swelling right lower molar with tenderness, throat clear, lungs clear.

## 2019-07-15 NOTE — ED PROVIDER NOTE - NSFOLLOWUPINSTRUCTIONS_ED_ALL_ED_FT
Dental Abscess  ImageA dental abscess is a collection of pus in or around a tooth that results from an infection. An abscess can cause pain in the affected area as well as other symptoms. Treatment is important to help with symptoms and to prevent the infection from spreading.    What are the causes?  This condition is caused by a bacterial infection around the root of the tooth that involves the inner part of the tooth (pulp). It may result from:    Severe tooth decay.  Trauma to the tooth, such as a broken or chipped tooth, that allows bacteria to enter into the pulp.  Severe gum disease around a tooth.    What increases the risk?  This condition is more likely to develop in males. It is also more likely to develop in people who:    Have dental decay (cavities).  Eat sugary snacks between meals.  Use tobacco products.  Have diabetes.  Have a weakened disease-fighting system (immune system).  Do not brush and care for their teeth regularly.    What are the signs or symptoms?  Symptoms of this condition include:    Severe pain in and around the infected tooth.  Swelling and redness around the infected tooth, in the mouth, or in the face.  Tenderness.  Pus drainage.  Bad breath.  Bitter taste in the mouth.  Difficulty swallowing.  Difficulty opening the mouth.  Nausea.  Vomiting.  Chills.  Swollen neck glands.  Fever.    How is this diagnosed?  This condition is diagnosed based on:    Your symptoms and your medical and dental history.  An examination of the infected tooth. During the exam, your dentist may tap on the infected tooth.    You may also have X-rays of the affected area.    How is this treated?  This condition is treated by getting rid of the infection. This may be done with:    Incision and drainage. This procedure is done by making an incision in the abscess to drain out the pus. Removing pus is the first priority in treating an abscess.  Antibiotic medicines. These may be used in certain situations.  Antibacterial mouth rinse.  A root canal. This may be performed to save the tooth. Your dentist accesses the visible part of your tooth (crown) with a drill and removes any damaged pulp. Then the space is filled and sealed off.  Tooth extraction. The tooth is pulled out if it cannot be saved by other treatment.    You may also receive treatment for pain, such as:    Acetaminophen or NSAIDs.  Gels that contain a numbing medicine.  An injection to block the pain near your nerve.    Follow these instructions at home:  Medicines     Take over-the-counter and prescription medicines only as told by your dentist.  If you were prescribed an antibiotic, take it as told by your dentist. Do not stop taking the antibiotic even if you start to feel better.  If you were prescribed a gel that contains a numbing medicine, use it exactly as told in the directions. Do not use these gels for children who are younger than 2 years of age.  Do not drive or use heavy machinery while taking prescription pain medicine.  General instructions     Rinse out your mouth often with salt water to relieve pain or swelling. To make a salt-water mixture, completely dissolve ½–1 tsp of salt in 1 cup of warm water.  Eat a soft diet while your abscess is healing.  Drink enough fluid to keep your urine pale yellow.  Do not apply heat to the outside of your mouth.  Do not use any products that contain nicotine or tobacco, such as cigarettes and e-cigarettes. If you need help quitting, ask your health care provider.  Keep all follow-up visits as told by your dentist. This is important.  How is this prevented?  Brush your teeth every morning and night with fluoride toothpaste. Floss one time each day.  Get regularly scheduled dental cleanings.  Consider having a dental sealant applied on teeth that have deep holes (caries).  Drink fluoridated water regularly. This includes most tap water. Check the label on bottled water to see if it contains fluoride.  Drink water instead of sugary drinks.  Eat healthy meals and snacks.  Wear a mouth guard or face shield to protect your teeth while playing sports.  Contact a health care provider if:  Your pain is worse and is not helped by medicine.  Get help right away if:  You have a fever or chills.  Your symptoms suddenly get worse.  You have a very bad headache.  You have problems breathing or swallowing.  You have trouble opening your mouth.  You have swelling in your neck or around your eye.  Summary  A dental abscess is a collection of pus in or around a tooth that results from an infection.  A dental abscess may result from severe tooth decay, trauma to the tooth, or severe gum disease around a tooth.  Symptoms include severe pain, swelling, redness, and drainage of pus in and around the infected tooth.  The first priority in treating a dental abscess is to drain out the pus. Treatment may also involve removing damage inside the tooth (root canal) or pulling out (extracting) the tooth.  This information is not intended to replace advice given to you by your health care provider. Make sure you discuss any questions you have with your health care provider.

## 2019-07-15 NOTE — ED PROVIDER NOTE - PHYSICAL EXAMINATION
VITAL SIGNS: I have reviewed nursing notes and confirm.  CONSTITUTIONAL: Well-developed; well-nourished; in no acute distress.  SKIN: Skin exam is warm and dry, no acute rash.  HEAD: Normocephalic; atraumatic.  EYES: Conjunctiva and sclera clear.  ENT: No nasal discharge; airway clear. (+) mild swelling to right mandible, no erythema. (+) TTP to right molars. No drooling.   NECK: Supple; non tender.  EXT: Normal ROM.   NEURO: Alert, oriented. Grossly unremarkable. No focal deficits.

## 2019-09-27 NOTE — H&P ADULT - PROBLEM SELECTOR PLAN 1
Referral is in [facility] and epic plan as per psychiatrist  prozac  klonopin  lamotrigine

## 2019-11-12 NOTE — DISCHARGE NOTE BEHAVIORAL HEALTH - NSBHDCIPNUMBERFT_PSY_A_CORE
Cindy Batista Patient Age: 63 year old  MESSAGE:   Patients daughter in law is calling and states Patient tongue has sore all over. Daughter In law was told in last appointment that if Patient continue get them to send Dr. Atilio Alaniz a message to see what he would  want her to do,     If Patient should schedule an appointment or prescribed something for her daughter in law states Patient has been using oral gel but it doesn't help.Message confirmed with caller      WEIGHT AND HEIGHT:   Wt Readings from Last 1 Encounters:   10/23/19 89.8 kg (198 lb)     Ht Readings from Last 1 Encounters:   10/23/19 5' 2\" (1.575 m)     BMI Readings from Last 1 Encounters:   10/23/19 36.21 kg/m²       ALLERGIES:  Hydrochlorothiazide  Current Outpatient Medications   Medication   • diclofenac-miSOPROStol 75-0.2 MG tablet   • alendronate (FOSAMAX) 70 MG tablet   • Glucosamine HCl (GLUCOSAMINE PO)   • baclofen (LIORESAL) 10 MG tablet   • spironolactone (ALDACTONE) 50 MG tablet   • metoPROLOL succinate (TOPROL-XL) 50 MG 24 hr tablet   • pravastatin (PRAVACHOL) 20 MG tablet   • aspirin (ECOTRIN) 81 MG EC tablet   • Calcium Carbonate-Vit D-Min (CALCIUM-VITAMIN D-MINERALS) 600-800 MG-UNIT Chew Tab   • omeprazole (PRILOSEC) 20 MG capsule     No current facility-administered medications for this visit.      PHARMACY to use: Please verify pharmacy with patient if needed.              Pharmacy preference(s) on file:   [id number] - [place], [place] - [address] [facility] [address]  [address]  [place] [address]  Phone: [phone] Fax: [phone]      CALL BACK INFO: Ok to leave response (including medical information) on answering machine  ROUTING: Patient's physician/staff        PCP: Jhon Olmedo MD         INS: Payor: [employer] - ADVOCATE / Plan: [employer] - ADVOCATE / Product Type: [employer] Risk   PATIENT ADDRESS:  [address] [place] [id number]   [phone]

## 2019-11-14 ENCOUNTER — APPOINTMENT (OUTPATIENT)
Dept: NEUROLOGY | Facility: CLINIC | Age: 49
End: 2019-11-14

## 2021-01-01 NOTE — PROGRESS NOTE BEHAVIORAL HEALTH - PROBLEM SELECTOR PLAN 1
Called [facility] radiology and and scheduled echo for Monday, 11/8 at 9am. Called family and let them know of the appointment time. Mom said this works for them. Gave her the number to radiology to call if needed.    Tracey Rea RN    
Continue Latuda 80mg q24  Continue Lamictal 200mg q12  Continue Klonopin 2mg q12  Continue Prozac 40mg q24  Vistaril 50mg q6 prn
Continue Latuda 80mg q24  Continue Lamictal 200mg q12  Continue Klonopin 2mg q12  Continue Prozac 40mg q24  Vistaril 50mg q6 prn
-Latuda 80mg q24  -Prozac 40mg q24  -Lamictal 200mg q12  -Klonopin 2mg q12  -Vistaril 50mg q6 prn
Continue Latuda 80mg q24  Continue Lamictal 200mg q12  Continue Klonopin 2mg q12  Continue Prozac 40mg q24  Vistaril 50mg q6 prn

## 2022-04-18 ENCOUNTER — APPOINTMENT (OUTPATIENT)
Dept: UROLOGY | Facility: CLINIC | Age: 52
End: 2022-04-18

## 2022-06-30 NOTE — PROVIDER CONTACT NOTE (OTHER) - REASON
"Chief Complaint:   Chief Complaint   Patient presents with    Knee Pain     right knee, patient had zilretta injection in 12/2021, has been having constant pain for a few weeks, states she feels fluid, wearing the knee brace for relief,        History of present illness:    This is a 89 y.o. year old female who complains of right knee pain and swelling.  Patient has had cortisone injection in the past and she has also had Zilretta injection versus a regular cortisone seems to work better for.    Review of Systems:    Constitution:   Denies chills, fever, and sweats.  HENT:   Denies headaches or blurry vision.  Cardiovascular:  Denies chest pain or irregular heart beat.  Respiratory:   Denies cough or shortness of breath.  Gastrointestinal:  Denies abdominal pain, nausea, or vomiting.  Musculoskeletal:   Denies muscle cramps.  Neurological:   Denies dizziness or focal weakness.  Psychiatric/Behavior: Normal mental status.  Hematology/Lymph:  Denies bleeding problem or easy bruising/bleeding.  Skin:    Denies rash or suspicious lesions.    Examination:    Vital Signs:    Vitals:    06/29/22 0945   BP: 128/71   Pulse: (!) 50   Weight: 73.9 kg (163 lb)   Height: 5' 1" (1.549 m)   PainSc:   3       Body mass index is 30.8 kg/m².    Constitution:   Well-developed, well nourished patient in no acute distress.  Neurological:   Alert and oriented x 3 and cooperative to examination.     Psychiatric/Behavior: Normal mental status.  Respiratory:   No shortness of breath.  Eyes:    Extraoccular muscles intact  Skin:    No scars, rash or suspicious lesions.    Physical Exam:       General Musculoskeletal Exam   Gait: antalgic       Right Knee Exam     Inspection   Erythema: absent  Effusion: present  Deformity: present  Bruising: absent    Tenderness   The patient is tender to palpation of the medial and the patellofemoral joint line    Crepitus   The patient has crepitus with ROM    Range of Motion   Extension: abnormal " H&P   Flexion: abnormal   5-115    Tests   Ligament Examination   MCL - Valgus: normal (0 to 2mm)  LCL - Varus: normal  Patella   Passive Patellar Tilt: neutral    Other   Sensation: normal    Comments:  Varus deformity    X-rays  reviewed today of the right knee from previous appointment  Patient has significant osteoarthritis of the right knee with bone-on-bone contact and varus deformity.  Osteophytes and sclerosis noted         Assessment: Primary osteoarthritis of right knee         Plan:  Right knee aspiration injection  Patient will call in 3-4 months if she needs a repeat injection          DISCLAIMER: This note may have been dictated using voice recognition software and may contain grammatical errors.     NOTE: Consult report sent to referring provider via [employer] EMR.

## 2022-09-24 ENCOUNTER — APPOINTMENT (OUTPATIENT)
Dept: ORTHOPEDIC SURGERY | Facility: CLINIC | Age: 52
End: 2022-09-24

## 2022-09-24 ENCOUNTER — RESULT CHARGE (OUTPATIENT)
Age: 52
End: 2022-09-24

## 2022-09-24 VITALS — HEIGHT: 70 IN | WEIGHT: 200 LBS | BODY MASS INDEX: 28.63 KG/M2

## 2022-09-24 DIAGNOSIS — Z00.00 ENCOUNTER FOR GENERAL ADULT MEDICAL EXAMINATION W/OUT ABNORMAL FINDINGS: ICD-10-CM

## 2022-09-24 DIAGNOSIS — S40.012A CONTUSION OF LEFT SHOULDER, INITIAL ENCOUNTER: ICD-10-CM

## 2022-09-24 PROCEDURE — 99214 OFFICE O/P EST MOD 30 MIN: CPT | Mod: 25

## 2022-09-24 PROCEDURE — 73030 X-RAY EXAM OF SHOULDER: CPT | Mod: LT

## 2022-09-24 PROCEDURE — 20610 DRAIN/INJ JOINT/BURSA W/O US: CPT

## 2022-09-24 NOTE — DISCUSSION/SUMMARY
[de-identified] :   X-ray findings were discussed with the patient.\par There are no acute displaced fractures or bony abnormalities.\par We discussed treatment options including anti-inflammatories, physical therapy, and cortisone injection.\par I prescribed the patient Mobic 15 mg 1 pill once a day for the pain and inflammation.\par I wrote a prescription for occupational/physical therapy for stretching, strengthening, range of motion, and different modalities to help with the pain and inflammation.\par The risks and benefits of a cortisone injection were explained.  He is a controlled diabetic.  He understands the cortisone could raise his blood sugar for 48-72 hours.  He would like to proceed with the injection.  Under sterile technique with the patient's consent, I injected 3 cc dexamethasone and 1 cc of 1% lidocaine into the glenohumeral joint space of the left shoulder.  Tolerated this well.  The puncture sites cover the Band-Aid.  He was advised to ice and rest the area.\par He went to contact his rheumatologist and explain his symptoms to ensure that he is not having a PMR flare-up.\par He understands that if he does not respond well to the steroid injection and NSAIDs, that he may need to go back on prednisone for longer period time.\par He also understands the bone contusions can take 4-6 weeks to heal in the 1st 2 weeks are typically the worst.\par He went to see Dr. Courtney in 4-6 weeks for repeat evaluation if he has continued pain.\par All questions were answered today.  He will contact the office with any questions or concerns.

## 2022-09-24 NOTE — PHYSICAL EXAM
[de-identified] :   Physical exam of his left shoulder:  Negative AC joint tenderness.  Mild glenohumeral joint tenderness.  Negative subacromial space tenderness.  He has full active and passive range of motion of the shoulder with some pain.  Negative drop-arm.  Positive Speed.  Negative [place].  Negative impingement or apprehension.  Strength is 4/5 in the left compared to the right.  Sensory and motor are intact.

## 2022-09-24 NOTE — DATA REVIEWED
[FreeTextEntry1] :   X-rays obtained in the office today of his left shoulder show AC joint arthritis without any acute displaced fractures or bony abnormalities.

## 2022-09-24 NOTE — HISTORY OF PRESENT ILLNESS
[de-identified] :  Patient is a 51-year-old male here for evaluation of his left shoulder.  He is accompanied by his wife.   he states that about 2 weeks ago, he fell and landed directly on the hardwood floor.  Prior to the fall, he was having some mild discomfort which he attributed to his mattress.  He has a history of PMR which he was on prednisone for several months.  He has been off the prednisone for the last 4 months.  He is well controlled diabetic.

## 2022-10-03 ENCOUNTER — APPOINTMENT (OUTPATIENT)
Dept: ORTHOPEDIC SURGERY | Facility: CLINIC | Age: 52
End: 2022-10-03

## 2022-10-03 DIAGNOSIS — M77.8 OTHER ENTHESOPATHIES, NOT ELSEWHERE CLASSIFIED: ICD-10-CM

## 2022-10-03 DIAGNOSIS — M54.12 RADICULOPATHY, CERVICAL REGION: ICD-10-CM

## 2022-10-03 DIAGNOSIS — M50.90 CERVICAL DISC DISORDER, UNSPECIFIED, UNSPECIFIED CERVICAL REGION: ICD-10-CM

## 2022-10-03 PROCEDURE — 72050 X-RAY EXAM NECK SPINE 4/5VWS: CPT

## 2022-10-03 PROCEDURE — 99213 OFFICE O/P EST LOW 20 MIN: CPT

## 2022-10-03 NOTE — DISCUSSION/SUMMARY
[de-identified] :   X-ray findings were discussed with the patient.\par There are no acute displaced fractures or bony abnormalities.\par We discussed treatment options including anti-inflammatories, physical therapy,    the deferred on a cortisone injection.\par  continueMobic 15 mg 1 pill once a day for the pain and inflammation.\par I wrote a prescription for occupational/physical therapy for stretching, strengthening, range of motion, and different modalities to help with the pain and inflammation.  the for the cervical spine and the left shoulder\par  no reason for an MRI of the neck or shoulder today

## 2022-10-03 NOTE — HISTORY OF PRESENT ILLNESS
[de-identified] :  Patient is a 51-year-old male here for evaluation of his left shoulder.  He is accompanied by his wife.   he states that about 2 weeks ago, he fell and landed directly on the hardwood floor.  Prior to the fall, he was having some mild discomfort which he attributed to his mattress.  He has a history of PMR which he was on prednisone for several months.  He has been off the prednisone for the last 4 months.  He is well controlled diabetic.

## 2022-10-03 NOTE — PHYSICAL EXAM
[de-identified] :   Physical exam of his left shoulder:  Negative AC joint tenderness.  Mild glenohumeral joint tenderness.  Negative subacromial space tenderness.  He has full active and passive range of motion of the shoulder with some pain.  Negative drop-arm.  Positive Speed.  Negative [place].  Negative impingement or apprehension.  Strength is 4/5 in the left compared to the right.  Sensory and motor are intact.  spasm in the neck mild dysesthesia down the arm\par \par X-rays cervical spine straightening degenerative changes C5-6

## 2022-10-03 NOTE — REASON FOR VISIT
[FreeTextEntry2] : left shoulder pain, neck and back pain  Just started taking the Mobic right handed was on an extended trip 3 weeks ago had a couple visit therapy therapist concerned may be a cervical component C5 history of a right humeral shaft fracture\par  cortisone injection last visit did help

## 2022-10-04 ENCOUNTER — APPOINTMENT (OUTPATIENT)
Dept: PAIN MANAGEMENT | Facility: CLINIC | Age: 52
End: 2022-10-04

## 2022-10-04 NOTE — DISCHARGE NOTE BEHAVIORAL HEALTH - NSBHDCIPNUMBERFT_PSY_A_CORE
"    10/4/2022         RE: Ed Murcia  [address] [place] [address]        Dear Colleague,    Thank you for referring your patient, Ed Murcia, to the [facility] [place]. Please see a copy of my visit note below.      Video-Visit Details    Type of service:  Video Visit  Video Start Time: 10:35  Video End Time:11:07  Originating Location (pt. Location): Home, [place]  Distant Location (provider location):  Home  Platform used for Video Visit: Ban Forte MD    Ed Murcia is a 34 year old year old male here for follow-up of diabetes mellitus, hypertriglyceridemia via a billable video visit.    Has PCP at [facility] (not available in [facility]). He also has PMHx of ADHD and hypertriglyceridemia    INTERVAL HISTORY:  - Wearing dexcom,  Transmitter not working and got new one so was off for a back  - On Trulicity 1.5mg weekly, minimal change in BG  - Feels that he is overall eating as healthy as he can, staying physically active and yet not making any progress with his blood glucoses      1) Diabetes Mellitus-- Type 2    Diabetes History:  Diagnosis: 2013, noticed polyuria/polydypsia, had screen done and diagnosed with DM2, some weight loss minimal at time of diagnosis  Hospitalizations: none  Previous Regimens: none  Current Regimen: Metformin 2000mg XR at night, Farxiga 10mg daily, Trulicity 1.5mg (first dose today)    BG check- Dexcom  Trends-                Denies polyuria, polydypsia, overall feels very well    Complications: no retinopathy, recent screening August, does have nephropathy  Fam: aunt Paternal w/ DM    Last eye exam: Completed end August, no retinopathy \"eyes look better than before\"  Foot Exam: completed at previous visit  ACEI/ARB: not now, ZACHARY elevated x2, on SGLT-2  Statin/ASA: not currently taking, on fish oil      2) Hypertriglyceridemia  - Longstanding, currently on Fenofibrate between 8/2-->9/27- with improving levels (1049-->611)  - " Taking fish oil once daily      BP Readings from Last 3 Encounters:   05/11/22 (!) 138/95       1/20/2022-  A1C- 9.5%  CBC- wnl  CMP- wnl (glucose 227)  Lipids- , HDL 38, , LDL (too high)  UA- No urine protein      No results found for: A1C      No results for input(s): CHOL, HDL, LDL, TRIG, CHOLHDLRATIO in the last 41421 hours.    No results found for: MICROL  No results found for: MICROALBUMIN      Wt Readings from Last 3 Encounters:   05/11/22 79.9 kg (176 lb 3.2 oz)       Current Outpatient Medications   Medication Sig Dispense Refill     blood glucose (NO BRAND SPECIFIED) lancets standard Use to test blood sugar 2 times daily or as directed. 100 each 3     blood glucose (NO BRAND SPECIFIED) test strip Use to test blood sugar 2 times daily or as directed. 100 strip 3     blood glucose (ONETOUCH VERIO IQ) test strip Use to test blood sugar up to 1 time daily. 50 strip 11     Continuous Blood Gluc Sensor (DEXCOM G6 SENSOR) MISC Change every 10 days. 3 each 3     dapagliflozin (FARXIGA) 10 MG TABS tablet        dulaglutide (TRULICITY) 1.5 MG/0.5ML pen Inject 1.5 mg Subcutaneous every 7 days 2 mL 3     escitalopram (LEXAPRO) 10 MG tablet        fenofibrate (TRICOR) 145 MG tablet Take 1 tablet (145 mg) by mouth daily 90 tablet 3     fish oil-omega-3 fatty acids 1000 MG capsule        metFORMIN (GLUCOPHAGE XR) 500 MG 24 hr tablet Take 4 tablets (2,000 mg) by mouth daily (with dinner) 360 tablet 3     blood glucose monitoring (ONE TOUCH ULTRA 2) meter device kit Use to test blood sugar 2 times daily or as directed. 1 kit 0       Histories reviewed and updated in Epic.  Past Medical History:   Diagnosis Date     Attention deficit disorder without mention of hyperactivity        No past surgical history on file.    Allergies:  Seasonale    Social History     Tobacco Use     Smoking status: Current Some Day Smoker     Types: Cigarettes, Cigars     Smokeless tobacco: Never Used   Substance Use Topics      Alcohol use: Yes       No family history on file.       REVIEW OF SYSTEMS:   ROS: 10 point ROS neg other than the symptoms noted above in the HPI.      EXAM:  Vitals: There were no vitals taken for this visit.    BMIE= There is no height or weight on file to calculate BMI.  Physical Exam (visual exam)  VS:  no vital signs taken for video visit  CONSTITUTIONAL: healthy, alert and NAD, responding appropriately  ENT: normocephalic, no visual evidence of trauma, normal nose and oral mucosa  EYES: conjunctivae and sclerae normal, no exophthalmos or proptosis  THYROID:  no visualized nodules or goiter  LUNGS: no audible wheeze, cough or visible cyanosis, no visible retractions or increased work of breathing  EXTREMITIES: no hand tremors  NEUROLOGY: cranial nerves grossly intact with no obvious deficit.  SKIN:  no visualized skin lesions or rash, no edema visualized  PSYCH: mentation appears normal, normal judgement      ASSESSMENT/PLAN:  No orders of the defined types were placed in this encounter.      1) Diabetes Mellitus -- NOT at goal, last a1c 9.2% 9/27  - Glucose Control-    - Will start basal insulin 0.2u/kg-- 15u daily   - Continue Trulicty 1.5mg weekly   - Continue metformin, Farxia, and dietary changes   - Continue Dexcom   - Will meet with DM educator for continued review of lifestyle changes and adjustment of insulin-- discussed that if after 2-3 weeks he is not seeing improvement in BG levels, will need to add mealtime insulin as well-- we need to be more aggressive with getting BG down and then can try to optimize regimen after that.   - Added c-peptide and MURPHY antibody to labs for testing for DM1 with next blood draw  - Referral to nephrology for management of sig proteinuia. Will retest, seeing some improvement and regression of retinopathy so if persistent can consider other etiologies.       2) Hypertriglyceridemia   - On Fenofibrate, Fish Oil   -Recheck in August when comes in   - Will add insulin, but  should add atorvastatin as well at next visit with low dose for lowering of all cholesterol levels/CV benefit. Making many changes so will delay this until next visit    RTC- 3 months    A total of 45 minutes were spent today 10/04/22 on this visit including chart review, history and counseling, documentation and other activities as detailed above.     Answers for HPI/ROS submitted by the patient on 9/27/2022  General Symptoms: No  Skin Symptoms: No  HENT Symptoms: No  EYE SYMPTOMS: No  HEART SYMPTOMS: No  LUNG SYMPTOMS: No  INTESTINAL SYMPTOMS: No  URINARY SYMPTOMS: No  REPRODUCTIVE SYMPTOMS: No  SKELETAL SYMPTOMS: No  BLOOD SYMPTOMS: No  NERVOUS SYSTEM SYMPTOMS: No  MENTAL HEALTH SYMPTOMS: No          Again, thank you for allowing me to participate in the care of your patient.        Sincerely,        Anjali Forte MD     [phone]

## 2022-10-20 ENCOUNTER — APPOINTMENT (OUTPATIENT)
Dept: PAIN MANAGEMENT | Facility: CLINIC | Age: 52
End: 2022-10-20

## 2022-10-20 VITALS — BODY MASS INDEX: 28.63 KG/M2 | WEIGHT: 200 LBS | HEIGHT: 70 IN

## 2022-10-20 PROCEDURE — 99204 OFFICE O/P NEW MOD 45 MIN: CPT

## 2022-10-20 NOTE — DISCUSSION/SUMMARY
[de-identified] : Mr. Wilder Bentley is a 52 year old male with a chief complaint of neck pain. He notes pain is referred into the left upper extremity. He also notes intermittent numbness and pins and needles in the left hand and fingers. He has been undergoing physical therapy of the cervical spine which has not helped to improve his pain. Patient's cervical MRI shows a disc herniation. Patient had a MRI that shows a radicular component along with pain referred into the upper extremity. Patient has trialed rehab (Home  exercise, physical therapy or chiropractic care) and medications.  I will schedule a cervical epidural steroid injection 1-3 depending on effectiveness Risk, benefits, pros and cons of procedure were explained to the patient using models and diagrams and their questions were answered.  \par \par The patient has severe anxiety of procedures that necessitates monitored anesthesia care (MAC). The procedure performed will be close to major nerves, arteries, and spinal cord and/or joint structures. Due to the proximity of these structures, we need the patient to be still during the procedure.  With the help of MAC, this will be safely achieved and decrease the risk of any complications.\par \par If the numbness and tingling in the left upper extremity become more constant, we would proceed with an EMG of the upper extremities. \par \par Patient will follow up in 4 weeks for reevaluation. \par \par I, Jossie Angel, attest that this documentation has been prepared under the direction and in the presence of Provider Edwardo Howe, DO\par The documentation recorded by the scribe, in my presence, accurately reflects the service I personally performed, and the decisions made by me with my edits as appropriate.\par \par Best Regards, \par Edwardo Howe, D.O. \par Diplomat, American Board of Anesthesiology \par Diplomat, American Board of Pain Medicine \par Diplomat, American Board of Pain Management

## 2022-10-20 NOTE — PHYSICAL EXAM
[de-identified] : GENERAL APPEARANCE OF PATIENT IS WELL DEVELOPED, WELL NOURISHED, BODY HABITUS NORMAL, WELL GROOMED, NO DEFORMITIES NOTED. \par Head - Atraumatic and Normocephalic \par Eyes, Nose, and Throat: External inspection of ears and nose are normal overall without scars, lesions, or masses noted. Assessment of hearing is normal\par Neck-Examination of neck shows no masses, overall appearance is normal, neck is symmetric, tracheal position is midline, no crepitus is noted. Examination of thyroid shows no enlargement, tenderness or masses\par Respiratory- Assessment of respiratory effort shows no intercostal retractions, no use of accessory muscles, unlabored breathing, and normal diaphragmatic movement.\par Cardiovascular- Examination of extremities show no edema or varicosities\par Musculoskeletal. Examination of gait is not antalgic and station is normal\par Inspection and palpation of digits and nails shows no clubbing, cyanosis, nodules, drainage, fluctuance, petechiae\par \par • Spine – inspection and palpation shows no misalignment, asymmetry, crepitation, defects, tenderness, masses, effusions. ROM is normal without crepitation or contracture. No instability or subluxation or laxity is noted. No abnormal movements.\par \par \par • Neck- inspection and palpation shows no misalignment, asymmetry, crepitation, defects, tenderness, masses, effusions. ROM is normal without crepitation or contracture. No instability or subluxation or laxity is noted. No abnormal movements.\par \par \par • RUE- inspection and palpation shows no misalignment, asymmetry, crepitation, defects, tenderness, masses, effusions. ROM is normal without crepitation or contracture. No instability or subluxation or laxity is noted. No abnormal movements.\par \par \par • LUE- inspection and palpation shows no misalignment, asymmetry, crepitation, defects, tenderness, masses, effusions. ROM is normal without crepitation or contracture. No instability or subluxation or laxity is noted. No abnormal movements.\par \par \par • RLE- inspection and palpation shows no misalignment, asymmetry, crepitation, defects, tenderness, masses, effusions. ROM is normal without crepitation or contracture. No instability or subluxation or laxity is noted. No abnormal movements.\par \par \par • LLE- inspection and palpation shows no misalignment, asymmetry, crepitation, defects, tenderness, masses, effusions. ROM is normal without crepitation or contracture. No instability or subluxation or laxity is noted. No abnormal movements.\par \par \par • Skin- Inspection of skin and subcutaneous tissue shows no rashes, lesions or ulcers. Palpation of skin and subcutaneous tissue shows no rashes, no indurations, subcutaneous nodules or tightening.\par \par \par • Abdomen- Nontender\par \par \par • Neurologic- CN 2-12 are grossly intact. No sensory or motor deficits in the upper and lower extremities. Adequate strength in upper and lower extremities \par \par \par • Psychiatric- Patient’s judgment and insight are intact. Oriented to time, place and person. Recent and remote memory intact.\par

## 2022-10-20 NOTE — HISTORY OF PRESENT ILLNESS
[FreeTextEntry1] : HISTORY OF PRESENT ILLNESS: This is a 52 year old an complaining of neck pain which refers into the left shoulder and arm. The patient has had this pain for 1 month. The pain has worsened in the last week. The pain started after no specific injury or event. Patient describes pain as moderate, rating 3/10 on the pain scale at best and 7/10 at worst. During the last month the pain has been nearly constant with symptoms worsening in no typical pattern. Pain described as sharp, pressure-like, throbbing, and shooting. Pain is associated with numbness and pins and needles into the left hand and fingers. Pain is not changed with lying down, standing, sitting, walking, exercise, and relaxation. Bowel or bladder habits have not changed.\par  \par ACTIVITIES: Patient is able to walk over 6 miles a day.  Patient has no pain while sitting and standing. The patient never lays down because of pain. Patient uses no assistive walking device at this time.\par \par PRIOR PAIN TREATMENTS:  No relief with physical therapy, exercise, heat treatment and cold treatment\par \par PRIOR PAIN MEDICATIONS:  Nabumetone\par \par Covid 19 - This in-office encounter has occurred during a Public Health Emergency (PHE). As required by law, due to the risk of respiratory-transmitted infectious diseases, our office provided additional materials, supplies and clinical staff to assist in keeping our patients, physicians and office staff safe during this health emergency.\par

## 2022-10-25 ENCOUNTER — APPOINTMENT (OUTPATIENT)
Dept: ORTHOPEDIC SURGERY | Facility: CLINIC | Age: 52
End: 2022-10-25

## 2022-10-26 ENCOUNTER — APPOINTMENT (OUTPATIENT)
Dept: PAIN MANAGEMENT | Facility: CLINIC | Age: 52
End: 2022-10-26
Payer: MEDICARE

## 2022-10-26 ENCOUNTER — APPOINTMENT (OUTPATIENT)
Dept: PAIN MANAGEMENT | Facility: CLINIC | Age: 52
End: 2022-10-26

## 2022-10-26 PROCEDURE — 93770 DETERMINATION VENOUS PRESS: CPT

## 2022-10-26 PROCEDURE — 93040 RHYTHM ECG WITH REPORT: CPT | Mod: 79

## 2022-10-26 PROCEDURE — 00600 ANES PX CRV SPINE&CORD NOS: CPT | Mod: QZ,P2

## 2022-10-26 PROCEDURE — 62321 NJX INTERLAMINAR CRV/THRC: CPT

## 2022-10-26 PROCEDURE — 94761 N-INVAS EAR/PLS OXIMETRY MLT: CPT

## 2022-11-01 NOTE — PROCEDURE
[FreeTextEntry1] : CERVICAL EPIDURAL STEROID INJECTION UNDER FLUOROSCOPY [FreeTextEntry3] : CERVICAL EPIDURAL STEROID INJECTION UNDER FLUOROSCOPY\par \par Preoperative Diagnosis: Cervical radiculopathy \par Postoperative Diagnosis: Same\par Procedure: Translaminar Cervical Epidural Injection under fluoroscopy\par Physician: Edwardo Howe D.O.\par Anesthesiologist/CRNA: Ms. Yee \par Anesthesia: See nurses note. MAC/Cold spray. Versed 4mg, Fentanyl 100mcg\par \par Medical Necessity: Failure of conservative management.\par \par CONSENT: The possible complications including infection, bleeding, nerve damage, hospital admission, death or failure of the procedure; though unusual, are theoretically possible. The patient was educated about the of the procedure and alternative therapies. All questions were answered and the patient freely gave consent to proceed.\par Indication for Fluoroscopy: This procedure requires the precise placement of the spinal needle. It is the only way to accurately and safely perform the injection.\par Monitoring: Patient had continuous blood pressure, EKG, and pulse oximetry throughout the case. See nurse's notes.\par After obtaining written consent, the After obtaining written consent, the patient was positioned prone on the fluoroscopy table. The back to her neck and upper thorax was prepped with Betadine and draped in usual sterile fashion. A time out was performed. The C7-T1 interspace was identified using fluoroscopy. The skin was infiltrated with cold spray and lidocaine 1% -- 1 cc for subcutaneous analgesia. The epidural space was identified using a 18g touhy needle with a midline approach using a loss of resistance technique. 2cc omnipaque was used to define the space. A solution of 5 ml of preservative-free sterile saline and 1ml of Methylprednisolone 80mg, 1cc was infused with minimal pressure on the syringe into the epidural space. The procedure was tolerated well. There was no evidence of CSF, Paresthesias nor heme. The needle was removed intact. A band aid was place on the site.\par \par Epidurogram: No signs of epidural fibrosis.\par \par \par Complications: None. The patient tolerated the procedure well.\par \par Disposition: I have examined the patient and there are no new physical findings since the original presentation. Sensory and motor function were intact. The patient met discharge criteria see nurses notes. The discharge instruction sheet was reviewed and given to the patient. The patient was discharged home with a . If patient gets sustained relief will have patient do shoulder griddle strengthening with Thera bands and walking.\par \par Comment: 1st ZULAY today, schedule 2nd in 1-2 weeks depending of effectiveness vs follow up in office depending on the insurance. Call if any problems. \par \par This document was electronically signed by:\par Edwardo Howe D.O.\par Diplomat, American Board of Anesthesiology\par Diplomat, American Board of Pain Medicine\par Diplomat, American Board of Pain Management\par \par

## 2022-11-03 ENCOUNTER — APPOINTMENT (OUTPATIENT)
Dept: PAIN MANAGEMENT | Facility: CLINIC | Age: 52
End: 2022-11-03

## 2022-11-09 ENCOUNTER — APPOINTMENT (OUTPATIENT)
Dept: PAIN MANAGEMENT | Facility: CLINIC | Age: 52
End: 2022-11-09

## 2022-11-09 PROCEDURE — 93770 DETERMINATION VENOUS PRESS: CPT

## 2022-11-09 PROCEDURE — 99152Z: CUSTOM

## 2022-11-09 PROCEDURE — 93040 RHYTHM ECG WITH REPORT: CPT | Mod: 79

## 2022-11-09 PROCEDURE — 62321 NJX INTERLAMINAR CRV/THRC: CPT

## 2022-11-09 NOTE — PROCEDURE
[FreeTextEntry1] : CERVICAL EPIDURAL STEROID INJECTION UNDER FLUOROSCOPY  [FreeTextEntry3] : CERVICAL EPIDURAL STEROID INJECTION UNDER FLUOROSCOPY\par \par Preoperative Diagnosis: Cervical radiculopathy \par Postoperative Diagnosis: Same\par Procedure: Translaminar Cervical Epidural Injection under fluoroscopy\par Physician: Edwardo Howe D.O.\par  \par Anesthesia: See nurses note. /Cold spray, IV sedation, Versed 4mg, Fentanyl 100 mcg\par \par Medical Necessity: Failure of conservative management.\par 1st Zulay 25% relief \par CONSENT: The possible complications including infection, bleeding, nerve damage, hospital admission, death or failure of the procedure; though unusual, are theoretically possible. The patient was educated about the of the procedure and alternative therapies. All questions were answered and the patient freely gave consent to proceed.\par Indication for Fluoroscopy: This procedure requires the precise placement of the spinal needle. It is the only way to accurately and safely perform the injection.\par Monitoring: Patient had continuous blood pressure, EKG, and pulse oximetry throughout the case. See nurse's notes.\par After obtaining written consent, the After obtaining written consent, the patient was positioned prone on the fluoroscopy table. The back to her neck and upper thorax was prepped with Betadine and draped in usual sterile fashion. A time out was performed. The C7-T1 interspace was identified using fluoroscopy. The skin was infiltrated with cold spray and lidocaine 1% -- 1 cc for subcutaneous analgesia. The epidural space was identified using a 18g touhy needle with a midline approach using a loss of resistance technique. 2cc omnipaque was used to define the space. A solution of 5 ml of preservative-free sterile saline and 1ml of Methylprednisolone 80mg, 1cc was infused with minimal pressure on the syringe into the epidural space. The procedure was tolerated well. There was no evidence of CSF, Paresthesias nor heme. The needle was removed intact. A band aid was place on the site.\par \par Epidurogram: No signs of epidural fibrosis.\par \par \par \par Complications: None. The patient tolerated the procedure well.\par \par Disposition: I have examined the patient and there are no new physical findings since the original presentation. Sensory and motor function were intact. The patient met discharge criteria see nurses notes. The discharge instruction sheet was reviewed and given to the patient. The patient was discharged home with a . If patient gets sustained relief will have patient do shoulder griddle strengthening with Thera bands and walking.\par \par Comment: 2nd ZULAY today, schedule 3rd in 1-2 weeks depending of effectiveness vs follow up in office depending on the insurance. Call if any problems. \par \par This document was electronically signed by:\par Edwardo Howe D.O.\par Diplomat, American Board of Anesthesiology\par Diplomat, American Board of Pain Medicine\par Diplomat, American Board of Pain Management\par \par \par

## 2022-11-29 ENCOUNTER — APPOINTMENT (OUTPATIENT)
Dept: PAIN MANAGEMENT | Facility: CLINIC | Age: 52
End: 2022-11-29
Payer: MEDICARE

## 2022-11-29 ENCOUNTER — APPOINTMENT (OUTPATIENT)
Dept: PAIN MANAGEMENT | Facility: CLINIC | Age: 52
End: 2022-11-29

## 2022-11-29 VITALS
HEART RATE: 87 BPM | DIASTOLIC BLOOD PRESSURE: 82 MMHG | BODY MASS INDEX: 28.63 KG/M2 | SYSTOLIC BLOOD PRESSURE: 137 MMHG | WEIGHT: 200 LBS | HEIGHT: 70 IN

## 2022-11-29 PROCEDURE — 93040 RHYTHM ECG WITH REPORT: CPT | Mod: 59

## 2022-11-29 PROCEDURE — 93770 DETERMINATION VENOUS PRESS: CPT

## 2022-11-29 PROCEDURE — 94761 N-INVAS EAR/PLS OXIMETRY MLT: CPT | Mod: 59

## 2022-11-29 PROCEDURE — 62321 NJX INTERLAMINAR CRV/THRC: CPT

## 2022-11-30 ENCOUNTER — APPOINTMENT (OUTPATIENT)
Dept: PAIN MANAGEMENT | Facility: CLINIC | Age: 52
End: 2022-11-30

## 2022-12-01 NOTE — DISCUSSION/SUMMARY
[de-identified] : Mr. Wilder Bentley is a 52 year old male with a chief complaint of neck pain. He notes pain is referred into the left upper extremity. He also notes intermittent numbness and pins and needles in the left hand and fingers. He has been undergoing physical therapy of the cervical spine which has not helped to improve his pain. Patient's cervical MRI shows a disc herniation. He is status post UZLAY x 2 on 10/26/22 and 11/9/22. He notes that this procedure provided him with excellent relief of his neck pain, however he is still experiencing numbness and tingling into the left upper extremity. An upper extremity EMG/NCV was ordered to delineate radiculopathy vs neuropathies vs nerve damage. He will follow up in 4 weeks for reevaluation. \par \par I, Jossie Angel, attest that this documentation has been prepared under the direction and in the presence of Provider Edwardo Howe DO\par The documentation recorded by the scribe, in my presence, accurately reflects the service I personally performed, and the decisions made by me with my edits as appropriate.\par \par Best Regards, \par LIZZETH Roldan.LUANA. \par Diplomat, American Board of Anesthesiology \par Diplomat, American Board of Pain Medicine \par Diplomat, American Board of Pain Management

## 2022-12-01 NOTE — HISTORY OF PRESENT ILLNESS
[FreeTextEntry1] : HISTORY OF PRESENT ILLNESS: This is a 52 year old an complaining of neck pain which refers into the left shoulder and arm. The patient has had this pain for 1 month. The pain has worsened in the last week. The pain started after no specific injury or event. Patient describes pain as moderate, rating 3/10 on the pain scale at best and 7/10 at worst. During the last month the pain has been nearly constant with symptoms worsening in no typical pattern. Pain described as sharp, pressure-like, throbbing, and shooting. Pain is associated with numbness and pins and needles into the left hand and fingers. Pain is not changed with lying down, standing, sitting, walking, exercise, and relaxation. Bowel or bladder habits have not changed.\par  \par ACTIVITIES: Patient is able to walk over 6 miles a day.  Patient has no pain while sitting and standing. The patient never lays down because of pain. Patient uses no assistive walking device at this time.\par \par PRIOR PAIN TREATMENTS:  No relief with physical therapy, exercise, heat treatment and cold treatment\par \par PRIOR PAIN MEDICATIONS:  Nabumetone\par \par PRESENTING TODAY: In revisit encounter in regard to his continues neck pain which refers into the left shoulder and arm. He is status post ZULAY x 2 on 10/26/22 and 11/9/22. He notes that this procedure provided him with excellent relief of his neck pain, however he is still experiencing numbness and tingling into the left upper extremity. \par \par Covid 19 - This in-office encounter has occurred during a Public Health Emergency (PHE). As required by law, due to the risk of respiratory-transmitted infectious diseases, our office provided additional materials, supplies and clinical staff to assist in keeping our patients, physicians and office staff safe during this health emergency.\par

## 2022-12-01 NOTE — PHYSICAL EXAM
[de-identified] : GENERAL APPEARANCE OF PATIENT IS WELL DEVELOPED, WELL NOURISHED, BODY HABITUS NORMAL, WELL GROOMED, NO DEFORMITIES NOTED. \par Head - Atraumatic and Normocephalic \par Eyes, Nose, and Throat: External inspection of ears and nose are normal overall without scars, lesions, or masses noted. Assessment of hearing is normal\par Neck-Examination of neck shows no masses, overall appearance is normal, neck is symmetric, tracheal position is midline, no crepitus is noted. Examination of thyroid shows no enlargement, tenderness or masses\par Respiratory- Assessment of respiratory effort shows no intercostal retractions, no use of accessory muscles, unlabored breathing, and normal diaphragmatic movement.\par Cardiovascular- Examination of extremities show no edema or varicosities\par Musculoskeletal. Examination of gait is not antalgic and station is normal\par Inspection and palpation of digits and nails shows no clubbing, cyanosis, nodules, drainage, fluctuance, petechiae\par \par • Spine – inspection and palpation shows no misalignment, asymmetry, crepitation, defects, tenderness, masses, effusions. ROM is normal without crepitation or contracture. No instability or subluxation or laxity is noted. No abnormal movements.\par \par \par • Neck- inspection and palpation shows no misalignment, asymmetry, crepitation, defects, tenderness, masses, effusions. ROM is normal without crepitation or contracture. No instability or subluxation or laxity is noted. No abnormal movements.\par \par \par • RUE- inspection and palpation shows no misalignment, asymmetry, crepitation, defects, tenderness, masses, effusions. ROM is normal without crepitation or contracture. No instability or subluxation or laxity is noted. No abnormal movements.\par \par \par • LUE- inspection and palpation shows no misalignment, asymmetry, crepitation, defects, tenderness, masses, effusions. ROM is normal without crepitation or contracture. No instability or subluxation or laxity is noted. No abnormal movements.\par \par \par • RLE- inspection and palpation shows no misalignment, asymmetry, crepitation, defects, tenderness, masses, effusions. ROM is normal without crepitation or contracture. No instability or subluxation or laxity is noted. No abnormal movements.\par \par \par • LLE- inspection and palpation shows no misalignment, asymmetry, crepitation, defects, tenderness, masses, effusions. ROM is normal without crepitation or contracture. No instability or subluxation or laxity is noted. No abnormal movements.\par \par \par • Skin- Inspection of skin and subcutaneous tissue shows no rashes, lesions or ulcers. Palpation of skin and subcutaneous tissue shows no rashes, no indurations, subcutaneous nodules or tightening.\par \par \par • Abdomen- Nontender\par \par \par • Neurologic- CN 2-12 are grossly intact. No sensory or motor deficits in the upper and lower extremities. Adequate strength in upper and lower extremities \par \par \par • Psychiatric- Patient’s judgment and insight are intact. Oriented to time, place and person. Recent and remote memory intact.\par

## 2022-12-02 ENCOUNTER — APPOINTMENT (OUTPATIENT)
Dept: PAIN MANAGEMENT | Facility: CLINIC | Age: 52
End: 2022-12-02

## 2022-12-02 PROCEDURE — 95911 NRV CNDJ TEST 9-10 STUDIES: CPT

## 2022-12-02 PROCEDURE — 95886 MUSC TEST DONE W/N TEST COMP: CPT

## 2023-01-03 ENCOUNTER — APPOINTMENT (OUTPATIENT)
Dept: PAIN MANAGEMENT | Facility: CLINIC | Age: 53
End: 2023-01-03
Payer: MEDICARE

## 2023-01-03 VITALS
DIASTOLIC BLOOD PRESSURE: 77 MMHG | BODY MASS INDEX: 28.63 KG/M2 | HEIGHT: 70 IN | SYSTOLIC BLOOD PRESSURE: 123 MMHG | WEIGHT: 200 LBS | HEART RATE: 72 BPM

## 2023-01-03 PROCEDURE — 99213 OFFICE O/P EST LOW 20 MIN: CPT

## 2023-01-03 NOTE — DISCUSSION/SUMMARY
[de-identified] : Mr. Wilder Bentley is a 52 year old male with a chief complaint of neck pain. He notes pain is referred into the left upper extremity. He also notes intermittent numbness and pins and needles in the left hand and fingers. Patient's cervical MRI shows a disc herniation. He is status post ZULAY x 2 on 10/26/22 and 11/9/22. He notes that this procedure provided him with excellent relief of his neck pain, and he is no longer experiencing numbness in the upper extremities. He is able to go to the gym and continue home exercise at this time. He will follow up in 8 weeks for reevaluation. \par \par I, Jossie Angel, attest that this documentation has been prepared under the direction and in the presence of Provider Sukhwinder Plata The documentation recorded by the scribe, in my presence, accurately reflects the service I personally performed, and the decisions made by me with my edits as appropriate.\par \par Best Regards, \par Edwardo Howe, D.O. \par Diplomat, American Board of Anesthesiology \par Diplomat, American Board of Pain Medicine \par Diplomat, American Board of Pain Management

## 2023-01-03 NOTE — HISTORY OF PRESENT ILLNESS
[FreeTextEntry1] : HISTORY OF PRESENT ILLNESS: This is a 52 year old an complaining of neck pain which refers into the left shoulder and arm. The patient has had this pain for 1 month. The pain has worsened in the last week. The pain started after no specific injury or event. Patient describes pain as moderate, rating 3/10 on the pain scale at best and 7/10 at worst. During the last month the pain has been nearly constant with symptoms worsening in no typical pattern. Pain described as sharp, pressure-like, throbbing, and shooting. Pain is associated with numbness and pins and needles into the left hand and fingers. Pain is not changed with lying down, standing, sitting, walking, exercise, and relaxation. Bowel or bladder habits have not changed.\par  \par ACTIVITIES: Patient is able to walk over 6 miles a day.  Patient has no pain while sitting and standing. The patient never lays down because of pain. Patient uses no assistive walking device at this time.\par \par PRIOR PAIN TREATMENTS:  No relief with physical therapy, exercise, heat treatment and cold treatment\par \par PRIOR PAIN MEDICATIONS:  Nabumetone\par \par PRESENTING TODAY: In revisit encounter in regard to his continues neck pain which refers into the left shoulder and arm. He is status post ZULAY x 2 on 10/26/22 and 11/9/22. He notes that this procedure provided him with excellent relief of his neck pain, and he no longer experiences numbness in his arms. He has been trialing physical therapy and is content with his relief at this time. \par \par Covid 19 - This in-office encounter has occurred during a Public Health Emergency (PHE). As required by law, due to the risk of respiratory-transmitted infectious diseases, our office provided additional materials, supplies and clinical staff to assist in keeping our patients, physicians and office staff safe during this health emergency.\par

## 2023-01-03 NOTE — PHYSICAL EXAM
[de-identified] : GENERAL APPEARANCE OF PATIENT IS WELL DEVELOPED, WELL NOURISHED, BODY HABITUS NORMAL, WELL GROOMED, NO DEFORMITIES NOTED. \par Head - Atraumatic and Normocephalic \par Eyes, Nose, and Throat: External inspection of ears and nose are normal overall without scars, lesions, or masses noted. Assessment of hearing is normal\par Neck-Examination of neck shows no masses, overall appearance is normal, neck is symmetric, tracheal position is midline, no crepitus is noted. Examination of thyroid shows no enlargement, tenderness or masses\par Respiratory- Assessment of respiratory effort shows no intercostal retractions, no use of accessory muscles, unlabored breathing, and normal diaphragmatic movement.\par Cardiovascular- Examination of extremities show no edema or varicosities\par Musculoskeletal. Examination of gait is not antalgic and station is normal\par Inspection and palpation of digits and nails shows no clubbing, cyanosis, nodules, drainage, fluctuance, petechiae\par \par • Spine – inspection and palpation shows no misalignment, asymmetry, crepitation, defects, tenderness, masses, effusions. ROM is normal without crepitation or contracture. No instability or subluxation or laxity is noted. No abnormal movements.\par \par \par • Neck- inspection and palpation shows no misalignment, asymmetry, crepitation, defects, tenderness, masses, effusions. ROM is normal without crepitation or contracture. No instability or subluxation or laxity is noted. No abnormal movements.\par \par \par • RUE- inspection and palpation shows no misalignment, asymmetry, crepitation, defects, tenderness, masses, effusions. ROM is normal without crepitation or contracture. No instability or subluxation or laxity is noted. No abnormal movements.\par \par \par • LUE- inspection and palpation shows no misalignment, asymmetry, crepitation, defects, tenderness, masses, effusions. ROM is normal without crepitation or contracture. No instability or subluxation or laxity is noted. No abnormal movements.\par \par \par • RLE- inspection and palpation shows no misalignment, asymmetry, crepitation, defects, tenderness, masses, effusions. ROM is normal without crepitation or contracture. No instability or subluxation or laxity is noted. No abnormal movements.\par \par \par • LLE- inspection and palpation shows no misalignment, asymmetry, crepitation, defects, tenderness, masses, effusions. ROM is normal without crepitation or contracture. No instability or subluxation or laxity is noted. No abnormal movements.\par \par \par • Skin- Inspection of skin and subcutaneous tissue shows no rashes, lesions or ulcers. Palpation of skin and subcutaneous tissue shows no rashes, no indurations, subcutaneous nodules or tightening.\par \par \par • Abdomen- Nontender\par \par \par • Neurologic- CN 2-12 are grossly intact. No sensory or motor deficits in the upper and lower extremities. Adequate strength in upper and lower extremities \par \par \par • Psychiatric- Patient’s judgment and insight are intact. Oriented to time, place and person. Recent and remote memory intact.\par

## 2023-02-01 NOTE — DISCHARGE NOTE BEHAVIORAL HEALTH - NSBHDCREFERSUBST_PSY_A_CORE
no Alert and oriented to person, place and time/Patient baseline mental status/Awake/Symptoms improved

## 2023-03-14 ENCOUNTER — APPOINTMENT (OUTPATIENT)
Dept: PAIN MANAGEMENT | Facility: CLINIC | Age: 53
End: 2023-03-14

## 2023-05-22 ENCOUNTER — APPOINTMENT (OUTPATIENT)
Dept: ORTHOPEDIC SURGERY | Facility: CLINIC | Age: 53
End: 2023-05-22

## 2023-05-22 ENCOUNTER — APPOINTMENT (OUTPATIENT)
Dept: PAIN MANAGEMENT | Facility: CLINIC | Age: 53
End: 2023-05-22
Payer: MEDICARE

## 2023-05-22 ENCOUNTER — APPOINTMENT (OUTPATIENT)
Dept: RADIOLOGY | Facility: CLINIC | Age: 53
End: 2023-05-22
Payer: MEDICARE

## 2023-05-22 VITALS
DIASTOLIC BLOOD PRESSURE: 86 MMHG | SYSTOLIC BLOOD PRESSURE: 132 MMHG | HEART RATE: 84 BPM | WEIGHT: 200 LBS | BODY MASS INDEX: 28.63 KG/M2 | HEIGHT: 70 IN

## 2023-05-22 PROCEDURE — 99214 OFFICE O/P EST MOD 30 MIN: CPT

## 2023-05-22 PROCEDURE — 72110 X-RAY EXAM L-2 SPINE 4/>VWS: CPT

## 2023-05-22 NOTE — DISCUSSION/SUMMARY
[de-identified] : Mr. Wilder Bentley is a 52 year old male with a chief complaint of neck and lower back pain. In regard to his neck pain, he is status post ZULAY x 2 on 10/26/22 and 11/9/22. He notes that this procedure provided him with excellent relief of his neck pain, and he is no longer experiencing numbness in the upper extremities. He is able to go to the gym and continue home exercise at this time.\par \par Today, he complains of lower back pain. He has had this pain for about 8 days. He believes pain started after working out. He has been managing his pain with Tylenol and motrin with mild relief. At this time we will order an x-ray of the lumbar spine for review. The patient will also begin physical therapy of the lower back. \par \par Physical therapy of the lower back 2-3 times a week for 4-6 weeks stressing a home exercise program of walking, shoulder griddle strengthening,  swimming, elliptical , recumbent bike, Grant chi and Yoga. Use things that heat like hot shower or icy heat before rehab, exercising and at the beginning of the day, and ice (ice in a bag never directly on the skin) after activity and at the end of the day.\par \par I, Jossie Angel, attest that this documentation has been prepared under the direction and in the presence of Provider Edwardo Howe DO\par The documentation recorded by the scribe, in my presence, accurately reflects the service I personally performed, and the decisions made by me with my edits as appropriate.\par \par Best Regards, \par Edwardo Howe D.O. \par Diplomat, American Board of Anesthesiology \par Diplomat, American Board of Pain Medicine \par Diplomat, American Board of Pain Management

## 2023-05-22 NOTE — HISTORY OF PRESENT ILLNESS
[FreeTextEntry1] : HISTORY OF PRESENT ILLNESS: This is a 52 year old an complaining of neck pain which refers into the left shoulder and arm. The patient has had this pain for 1 month. The pain has worsened in the last week. The pain started after no specific injury or event. Patient describes pain as moderate, rating 3/10 on the pain scale at best and 7/10 at worst. During the last month the pain has been nearly constant with symptoms worsening in no typical pattern. Pain described as sharp, pressure-like, throbbing, and shooting. Pain is associated with numbness and pins and needles into the left hand and fingers. Pain is not changed with lying down, standing, sitting, walking, exercise, and relaxation. Bowel or bladder habits have not changed.\par  \par ACTIVITIES: Patient is able to walk over 6 miles a day.  Patient has no pain while sitting and standing. The patient never lays down because of pain. Patient uses no assistive walking device at this time.\par \par PRIOR PAIN TREATMENTS:  No relief with physical therapy, exercise, heat treatment and cold treatment\par \par PRIOR PAIN MEDICATIONS:  Nabumetone\par \par PRESENTING TODAY: In revisit encounter. In regard to his neck pain, he is status post ZULAY x 2 on 10/26/22 and 11/9/22. He notes that this procedure provided him with excellent relief of his neck pain, and he no longer experiences numbness in his arms. \par \par Today, the patient presents complaining of lower back pain. He has had this pain for about 8 days. He notes pain may have started from working out. He has been taking tylenol and motrin for his acute pain. \par

## 2023-05-22 NOTE — PHYSICAL EXAM
[de-identified] : GENERAL APPEARANCE OF PATIENT IS WELL DEVELOPED, WELL NOURISHED, BODY HABITUS NORMAL, WELL GROOMED, NO DEFORMITIES NOTED. \par Head - Atraumatic and Normocephalic \par Eyes, Nose, and Throat: External inspection of ears and nose are normal overall without scars, lesions, or masses noted. Assessment of hearing is normal\par Neck-Examination of neck shows no masses, overall appearance is normal, neck is symmetric, tracheal position is midline, no crepitus is noted. Examination of thyroid shows no enlargement, tenderness or masses\par Respiratory- Assessment of respiratory effort shows no intercostal retractions, no use of accessory muscles, unlabored breathing, and normal diaphragmatic movement.\par Cardiovascular- Examination of extremities show no edema or varicosities\par Musculoskeletal. Examination of gait is not antalgic and station is normal\par Inspection and palpation of digits and nails shows no clubbing, cyanosis, nodules, drainage, fluctuance, petechiae\par \par • Spine – inspection and palpation shows no misalignment, asymmetry, crepitation, defects, tenderness, masses, effusions. ROM is normal without crepitation or contracture. No instability or subluxation or laxity is noted. No abnormal movements.\par \par \par • Neck- inspection and palpation shows no misalignment, asymmetry, crepitation, defects, tenderness, masses, effusions. ROM is normal without crepitation or contracture. No instability or subluxation or laxity is noted. No abnormal movements.\par \par \par • RUE- inspection and palpation shows no misalignment, asymmetry, crepitation, defects, tenderness, masses, effusions. ROM is normal without crepitation or contracture. No instability or subluxation or laxity is noted. No abnormal movements.\par \par \par • LUE- inspection and palpation shows no misalignment, asymmetry, crepitation, defects, tenderness, masses, effusions. ROM is normal without crepitation or contracture. No instability or subluxation or laxity is noted. No abnormal movements.\par \par \par • RLE- inspection and palpation shows no misalignment, asymmetry, crepitation, defects, tenderness, masses, effusions. ROM is normal without crepitation or contracture. No instability or subluxation or laxity is noted. No abnormal movements.\par \par \par • LLE- inspection and palpation shows no misalignment, asymmetry, crepitation, defects, tenderness, masses, effusions. ROM is normal without crepitation or contracture. No instability or subluxation or laxity is noted. No abnormal movements.\par \par \par • Skin- Inspection of skin and subcutaneous tissue shows no rashes, lesions or ulcers. Palpation of skin and subcutaneous tissue shows no rashes, no indurations, subcutaneous nodules or tightening.\par \par \par • Abdomen- Nontender\par \par \par • Neurologic- CN 2-12 are grossly intact. No sensory or motor deficits in the upper and lower extremities. Adequate strength in upper and lower extremities \par \par \par • Psychiatric- Patient’s judgment and insight are intact. Oriented to time, place and person. Recent and remote memory intact.\par

## 2023-05-23 DIAGNOSIS — B02.29 OTHER POSTHERPETIC NERVOUS SYSTEM INVOLVEMENT: ICD-10-CM

## 2023-06-01 ENCOUNTER — APPOINTMENT (OUTPATIENT)
Dept: PAIN MANAGEMENT | Facility: CLINIC | Age: 53
End: 2023-06-01
Payer: MEDICARE

## 2023-06-01 VITALS — WEIGHT: 200 LBS | BODY MASS INDEX: 28.63 KG/M2 | HEIGHT: 70 IN

## 2023-06-01 DIAGNOSIS — M47.816 SPONDYLOSIS W/OUT MYELOPATHY OR RADICULOPATHY, LUMBAR REGION: ICD-10-CM

## 2023-06-01 DIAGNOSIS — M47.817 SPONDYLOSIS W/OUT MYELOPATHY OR RADICULOPATHY, LUMBOSACRAL REGION: ICD-10-CM

## 2023-06-01 PROCEDURE — 99213 OFFICE O/P EST LOW 20 MIN: CPT

## 2023-06-04 PROBLEM — M47.816 ARTHRITIS OF FACET JOINT OF LUMBAR SPINE: Status: ACTIVE | Noted: 2023-05-22

## 2023-06-04 PROBLEM — M47.817 ARTHRITIS OF LUMBOSACRAL SPINE: Status: ACTIVE | Noted: 2023-05-22

## 2023-06-04 NOTE — PHYSICAL EXAM
[de-identified] : GENERAL APPEARANCE OF PATIENT IS WELL DEVELOPED, WELL NOURISHED, BODY HABITUS NORMAL, WELL GROOMED, NO DEFORMITIES NOTED. \par Head - Atraumatic and Normocephalic \par Eyes, Nose, and Throat: External inspection of ears and nose are normal overall without scars, lesions, or masses noted. Assessment of hearing is normal\par Neck-Examination of neck shows no masses, overall appearance is normal, neck is symmetric, tracheal position is midline, no crepitus is noted. Examination of thyroid shows no enlargement, tenderness or masses\par Respiratory- Assessment of respiratory effort shows no intercostal retractions, no use of accessory muscles, unlabored breathing, and normal diaphragmatic movement.\par Cardiovascular- Examination of extremities show no edema or varicosities\par Musculoskeletal. Examination of gait is not antalgic and station is normal\par Inspection and palpation of digits and nails shows no clubbing, cyanosis, nodules, drainage, fluctuance, petechiae\par \par • Spine – inspection and palpation shows no misalignment, asymmetry, crepitation, defects, tenderness, masses, effusions. ROM is normal without crepitation or contracture. No instability or subluxation or laxity is noted. No abnormal movements.\par \par \par • Neck- inspection and palpation shows no misalignment, asymmetry, crepitation, defects, tenderness, masses, effusions. ROM is normal without crepitation or contracture. No instability or subluxation or laxity is noted. No abnormal movements.\par \par \par • RUE- inspection and palpation shows no misalignment, asymmetry, crepitation, defects, tenderness, masses, effusions. ROM is normal without crepitation or contracture. No instability or subluxation or laxity is noted. No abnormal movements.\par \par \par • LUE- inspection and palpation shows no misalignment, asymmetry, crepitation, defects, tenderness, masses, effusions. ROM is normal without crepitation or contracture. No instability or subluxation or laxity is noted. No abnormal movements.\par \par \par • RLE- inspection and palpation shows no misalignment, asymmetry, crepitation, defects, tenderness, masses, effusions. ROM is normal without crepitation or contracture. No instability or subluxation or laxity is noted. No abnormal movements.\par \par \par • LLE- inspection and palpation shows no misalignment, asymmetry, crepitation, defects, tenderness, masses, effusions. ROM is normal without crepitation or contracture. No instability or subluxation or laxity is noted. No abnormal movements.\par \par \par • Skin- Inspection of skin and subcutaneous tissue shows no rashes, lesions or ulcers. Palpation of skin and subcutaneous tissue shows no rashes, no indurations, subcutaneous nodules or tightening.\par \par \par • Abdomen- Nontender\par \par \par • Neurologic- CN 2-12 are grossly intact. No sensory or motor deficits in the upper and lower extremities. Adequate strength in upper and lower extremities \par \par \par • Psychiatric- Patient’s judgment and insight are intact. Oriented to time, place and person. Recent and remote memory intact.\par

## 2023-06-04 NOTE — DISCUSSION/SUMMARY
[de-identified] : Mr. Wilder Bentley is a 52 year old male with a chief complaint of neck and lower back pain. His neck pain remains stable at this time. \par \par As for his lower back pain, he notes pain has started to improve. He completed 3 sessions of physical therapy and has been exercising at home. He states that his pain has improved by at least 75% at this time. He will continue home exercise and physical therapy. He may follow up on an as needed basis. \par \par I, Jossie Angel, attest that this documentation has been prepared under the direction and in the presence of Provider Edwardo Howe DO\par The documentation recorded by the scribe, in my presence, accurately reflects the service I personally performed, and the decisions made by me with my edits as appropriate.\par \par Best Regards, \par Edwardo Howe, D.O. \par Diplomat, American Board of Anesthesiology \par Diplomat, American Board of Pain Medicine \par Diplomat, American Board of Pain Management

## 2023-06-04 NOTE — HISTORY OF PRESENT ILLNESS
[FreeTextEntry1] : HISTORY OF PRESENT ILLNESS: This is a 52 year old an complaining of neck pain which refers into the left shoulder and arm. The patient has had this pain for 1 month. The pain has worsened in the last week. The pain started after no specific injury or event. Patient describes pain as moderate, rating 3/10 on the pain scale at best and 7/10 at worst. During the last month the pain has been nearly constant with symptoms worsening in no typical pattern. Pain described as sharp, pressure-like, throbbing, and shooting. Pain is associated with numbness and pins and needles into the left hand and fingers. Pain is not changed with lying down, standing, sitting, walking, exercise, and relaxation. Bowel or bladder habits have not changed.\par  \par ACTIVITIES: Patient is able to walk over 6 miles a day.  Patient has no pain while sitting and standing. The patient never lays down because of pain. Patient uses no assistive walking device at this time.\par \par PRIOR PAIN TREATMENTS:  No relief with physical therapy, exercise, heat treatment and cold treatment\par \par PRIOR PAIN MEDICATIONS:  Nabumetone\par \par PRESENTING TODAY: In revisit encounter. In regard to his lower back pain, which has been improving. He has been trialing physical therapy and has completed 3 sessions. He has been using lidocaine patches and notes moderate relief. He believes he is about 75% better at this time and continues to improve with home exercise and physical therapy.

## 2023-07-13 ENCOUNTER — APPOINTMENT (OUTPATIENT)
Dept: ORTHOPEDIC SURGERY | Facility: CLINIC | Age: 53
End: 2023-07-13

## 2023-07-14 ENCOUNTER — TRANSCRIPTION ENCOUNTER (OUTPATIENT)
Age: 53
End: 2023-07-14

## 2023-07-14 ENCOUNTER — OUTPATIENT (OUTPATIENT)
Dept: INPATIENT UNIT | Facility: HOSPITAL | Age: 53
LOS: 1 days | Discharge: HOME | End: 2023-07-14
Payer: MEDICARE

## 2023-07-14 ENCOUNTER — RESULT REVIEW (OUTPATIENT)
Age: 53
End: 2023-07-14

## 2023-07-14 VITALS
HEART RATE: 57 BPM | SYSTOLIC BLOOD PRESSURE: 109 MMHG | WEIGHT: 210.1 LBS | HEIGHT: 70 IN | TEMPERATURE: 98 F | DIASTOLIC BLOOD PRESSURE: 71 MMHG | RESPIRATION RATE: 18 BRPM

## 2023-07-14 VITALS
SYSTOLIC BLOOD PRESSURE: 110 MMHG | DIASTOLIC BLOOD PRESSURE: 69 MMHG | OXYGEN SATURATION: 99 % | RESPIRATION RATE: 18 BRPM | HEART RATE: 58 BPM

## 2023-07-14 DIAGNOSIS — Z80.0 FAMILY HISTORY OF MALIGNANT NEOPLASM OF DIGESTIVE ORGANS: ICD-10-CM

## 2023-07-14 DIAGNOSIS — Z98.890 OTHER SPECIFIED POSTPROCEDURAL STATES: Chronic | ICD-10-CM

## 2023-07-14 DIAGNOSIS — K86.2 CYST OF PANCREAS: ICD-10-CM

## 2023-07-14 LAB — GLUCOSE BLDC GLUCOMTR-MCNC: 95 MG/DL — SIGNIFICANT CHANGE UP (ref 70–99)

## 2023-07-14 PROCEDURE — 82962 GLUCOSE BLOOD TEST: CPT

## 2023-07-14 PROCEDURE — 43237 ENDOSCOPIC US EXAM ESOPH: CPT

## 2023-07-14 PROCEDURE — 88312 SPECIAL STAINS GROUP 1: CPT

## 2023-07-14 PROCEDURE — 88305 TISSUE EXAM BY PATHOLOGIST: CPT | Mod: 26

## 2023-07-14 PROCEDURE — 43239 EGD BIOPSY SINGLE/MULTIPLE: CPT | Mod: XU

## 2023-07-14 PROCEDURE — 88312 SPECIAL STAINS GROUP 1: CPT | Mod: 26

## 2023-07-14 PROCEDURE — 88305 TISSUE EXAM BY PATHOLOGIST: CPT

## 2023-07-14 RX ORDER — FLUOXETINE HCL 10 MG
1 CAPSULE ORAL
Refills: 0 | DISCHARGE

## 2023-07-14 RX ORDER — SIMVASTATIN 20 MG/1
1 TABLET, FILM COATED ORAL
Refills: 0 | DISCHARGE

## 2023-07-14 RX ORDER — AMANTADINE HCL 100 MG
1 CAPSULE ORAL
Refills: 0 | DISCHARGE

## 2023-07-14 NOTE — ASU PREOP CHECKLIST - HEIGHT IN CM
----- Message from Alta Crawford sent at 12/28/2017  1:55 PM CST -----  Please call patient in regards to mammo results, [phone]  
I left a message for the patient to call back.   
[phone]

## 2023-07-14 NOTE — ASU DISCHARGE PLAN (ADULT/PEDIATRIC) - CARE PROVIDER_API CALL
Ang Swenson  Gastroenterology  [address] [place] [address]  Phone: [phone]  Fax: [phone]  Follow Up Time:

## 2023-07-14 NOTE — H&P PST ADULT - NSICDXPASTMEDICALHX_GEN_ALL_CORE_FT
PAST MEDICAL HISTORY:  Diabetes mellitus, type 2     High blood cholesterol     Paranoid schizophrenia

## 2023-07-14 NOTE — CHART NOTE - NSCHARTNOTEFT_GEN_A_CORE
PACU ANESTHESIA ADMISSION NOTE      Procedure:   Post op diagnosis:      ____  Intubated  TV:______       Rate: ______      FiO2: ______    __x__  Patent Airway    __x__  Full return of protective reflexes    ____  Full recovery from anesthesia / back to baseline     Vitals:   T:  98.2         R:   16               BP:     107/64             Sat:    98               P: 71      Mental Status:  __x__ Awake   __x___ Alert   _____ Drowsy   _____ Sedated    Nausea/Vomiting:  ___x_ NO  ______Yes,   See Post - Op Orders          Pain Scale (0-10):  __0___    Treatment: ____ None    ____ See Post - Op/PCA Orders    Post - Operative Fluids:   _x___ Oral   ____ See Post - Op Orders    Plan: Discharge:   __x__Home       _____Floor     _____Critical Care    _____  Other:_________________    Comments:

## 2023-07-19 LAB — SURGICAL PATHOLOGY STUDY: SIGNIFICANT CHANGE UP

## 2023-07-20 DIAGNOSIS — K29.50 UNSPECIFIED CHRONIC GASTRITIS WITHOUT BLEEDING: ICD-10-CM

## 2023-07-20 DIAGNOSIS — Z79.84 LONG TERM (CURRENT) USE OF ORAL HYPOGLYCEMIC DRUGS: ICD-10-CM

## 2023-07-20 DIAGNOSIS — K86.2 CYST OF PANCREAS: ICD-10-CM

## 2023-07-20 DIAGNOSIS — E11.9 TYPE 2 DIABETES MELLITUS WITHOUT COMPLICATIONS: ICD-10-CM

## 2023-07-21 NOTE — PATIENT PROFILE BEHAVIORAL HEALTH - NS PRO PT RIGHT SUPPORT PERSON
PATIENT INSTRUCTIONS - Podiatry / Foot & Ankle Surgery      Ok to stand on left foot - no appreciable pathology      Ok to continue PT        Ok to obtain second opinion            
Yes

## 2023-09-27 ENCOUNTER — APPOINTMENT (OUTPATIENT)
Dept: PAIN MANAGEMENT | Facility: CLINIC | Age: 53
End: 2023-09-27
Payer: MEDICARE

## 2023-09-27 VITALS
HEIGHT: 70 IN | SYSTOLIC BLOOD PRESSURE: 129 MMHG | BODY MASS INDEX: 28.63 KG/M2 | DIASTOLIC BLOOD PRESSURE: 79 MMHG | HEART RATE: 69 BPM | WEIGHT: 200 LBS

## 2023-09-27 DIAGNOSIS — M50.122 CERVICAL DISC DISORDER AT C5-C6 LEVEL WITH RADICULOPATHY: ICD-10-CM

## 2023-09-27 DIAGNOSIS — M70.62 TROCHANTERIC BURSITIS, LEFT HIP: ICD-10-CM

## 2023-09-27 DIAGNOSIS — M25.552 PAIN IN LEFT HIP: ICD-10-CM

## 2023-09-27 PROCEDURE — 99214 OFFICE O/P EST MOD 30 MIN: CPT

## 2023-11-06 NOTE — PATIENT PROFILE BEHAVIORAL HEALTH - NSBHPSTACCFRM_PSY_A_CORE
Message to [employer] representative whether biopsy report indicates enough tissue left for testing.   no

## 2023-11-15 ENCOUNTER — APPOINTMENT (OUTPATIENT)
Dept: PAIN MANAGEMENT | Facility: CLINIC | Age: 53
End: 2023-11-15

## 2023-12-10 ENCOUNTER — APPOINTMENT (OUTPATIENT)
Dept: ORTHOPEDIC SURGERY | Facility: CLINIC | Age: 53
End: 2023-12-10
Payer: MEDICARE

## 2023-12-10 VITALS — WEIGHT: 200 LBS | BODY MASS INDEX: 28.63 KG/M2 | HEIGHT: 70 IN

## 2023-12-10 DIAGNOSIS — S89.92XA UNSPECIFIED INJURY OF LEFT LOWER LEG, INITIAL ENCOUNTER: ICD-10-CM

## 2023-12-10 PROCEDURE — 99213 OFFICE O/P EST LOW 20 MIN: CPT

## 2023-12-10 PROCEDURE — 73562 X-RAY EXAM OF KNEE 3: CPT | Mod: LT

## 2023-12-11 ENCOUNTER — APPOINTMENT (OUTPATIENT)
Dept: MRI IMAGING | Facility: CLINIC | Age: 53
End: 2023-12-11
Payer: MEDICARE

## 2023-12-11 PROCEDURE — 73721 MRI JNT OF LWR EXTRE W/O DYE: CPT | Mod: LT,MH

## 2023-12-15 ENCOUNTER — APPOINTMENT (OUTPATIENT)
Dept: ORTHOPEDIC SURGERY | Facility: CLINIC | Age: 53
End: 2023-12-15
Payer: MEDICARE

## 2023-12-15 VITALS
BODY MASS INDEX: 29.35 KG/M2 | OXYGEN SATURATION: 97 % | DIASTOLIC BLOOD PRESSURE: 70 MMHG | HEART RATE: 68 BPM | WEIGHT: 205 LBS | HEIGHT: 70 IN | SYSTOLIC BLOOD PRESSURE: 102 MMHG

## 2023-12-15 DIAGNOSIS — M25.562 PAIN IN LEFT KNEE: ICD-10-CM

## 2023-12-15 DIAGNOSIS — S83.281A OTHER TEAR OF LATERAL MENISCUS, CURRENT INJURY, RIGHT KNEE, INITIAL ENCOUNTER: ICD-10-CM

## 2023-12-15 PROCEDURE — 99203 OFFICE O/P NEW LOW 30 MIN: CPT

## 2023-12-15 RX ORDER — FLUOXETINE HYDROCHLORIDE 40 MG/1
40 CAPSULE ORAL
Refills: 0 | Status: ACTIVE | COMMUNITY

## 2023-12-15 RX ORDER — FLUOXETINE HYDROCHLORIDE 20 MG/1
20 CAPSULE ORAL DAILY
Refills: 0 | Status: DISCONTINUED | COMMUNITY
End: 2023-12-15

## 2023-12-15 RX ORDER — LIDOCAINE 5% 700 MG/1
5 PATCH TOPICAL
Qty: 30 | Refills: 1 | Status: DISCONTINUED | COMMUNITY
Start: 2023-05-22 | End: 2023-12-15

## 2023-12-15 RX ORDER — MELOXICAM 15 MG/1
15 TABLET ORAL DAILY
Qty: 1 | Refills: 1 | Status: ACTIVE | COMMUNITY
Start: 2023-12-15 | End: 1900-01-01

## 2023-12-15 RX ORDER — MELOXICAM 15 MG/1
15 TABLET ORAL DAILY
Qty: 30 | Refills: 2 | Status: DISCONTINUED | COMMUNITY
Start: 2022-09-24 | End: 2023-12-15

## 2023-12-15 RX ORDER — AMANTADINE HYDROCHLORIDE 100 1/1
TABLET ORAL
Refills: 0 | Status: ACTIVE | COMMUNITY

## 2023-12-15 RX ORDER — NABUMETONE 750 MG/1
750 TABLET, FILM COATED ORAL
Qty: 60 | Refills: 0 | Status: COMPLETED | COMMUNITY
Start: 2022-10-08 | End: 2023-12-15

## 2023-12-15 RX ORDER — LURASIDONE HYDROCHLORIDE 20 MG/1
20 TABLET, FILM COATED ORAL DAILY
Refills: 0 | Status: DISCONTINUED | COMMUNITY
End: 2023-12-15

## 2023-12-18 ENCOUNTER — APPOINTMENT (OUTPATIENT)
Dept: ORTHOPEDIC SURGERY | Facility: CLINIC | Age: 53
End: 2023-12-18

## 2024-01-11 ENCOUNTER — APPOINTMENT (OUTPATIENT)
Dept: ORTHOPEDIC SURGERY | Facility: CLINIC | Age: 54
End: 2024-01-11

## 2024-01-12 ENCOUNTER — APPOINTMENT (OUTPATIENT)
Dept: ORTHOPEDIC SURGERY | Facility: CLINIC | Age: 54
End: 2024-01-12

## 2024-01-26 ENCOUNTER — APPOINTMENT (OUTPATIENT)
Dept: ORTHOPEDIC SURGERY | Facility: CLINIC | Age: 54
End: 2024-01-26

## 2024-02-02 ENCOUNTER — APPOINTMENT (OUTPATIENT)
Dept: UROLOGY | Facility: CLINIC | Age: 54
End: 2024-02-02

## 2024-02-28 ENCOUNTER — EMERGENCY (EMERGENCY)
Facility: HOSPITAL | Age: 54
LOS: 0 days | Discharge: HOME | End: 2024-02-28
Attending: EMERGENCY MEDICINE
Payer: MEDICARE

## 2024-02-28 VITALS
RESPIRATION RATE: 16 BRPM | WEIGHT: 207.01 LBS | SYSTOLIC BLOOD PRESSURE: 156 MMHG | OXYGEN SATURATION: 97 % | DIASTOLIC BLOOD PRESSURE: 68 MMHG | TEMPERATURE: 98 F | HEART RATE: 86 BPM

## 2024-02-28 DIAGNOSIS — Z20.822 CONTACT WITH AND (SUSPECTED) EXPOSURE TO COVID-19: ICD-10-CM

## 2024-02-28 DIAGNOSIS — J02.9 ACUTE PHARYNGITIS, UNSPECIFIED: ICD-10-CM

## 2024-02-28 DIAGNOSIS — R07.89 OTHER CHEST PAIN: ICD-10-CM

## 2024-02-28 DIAGNOSIS — R05.1 ACUTE COUGH: ICD-10-CM

## 2024-02-28 DIAGNOSIS — E78.5 HYPERLIPIDEMIA, UNSPECIFIED: ICD-10-CM

## 2024-02-28 DIAGNOSIS — E11.9 TYPE 2 DIABETES MELLITUS WITHOUT COMPLICATIONS: ICD-10-CM

## 2024-02-28 DIAGNOSIS — Z82.49 FAMILY HISTORY OF ISCHEMIC HEART DISEASE AND OTHER DISEASES OF THE CIRCULATORY SYSTEM: ICD-10-CM

## 2024-02-28 DIAGNOSIS — R09.81 NASAL CONGESTION: ICD-10-CM

## 2024-02-28 DIAGNOSIS — Z98.890 OTHER SPECIFIED POSTPROCEDURAL STATES: Chronic | ICD-10-CM

## 2024-02-28 DIAGNOSIS — R79.89 OTHER SPECIFIED ABNORMAL FINDINGS OF BLOOD CHEMISTRY: ICD-10-CM

## 2024-02-28 LAB
ALBUMIN SERPL ELPH-MCNC: 4 G/DL — SIGNIFICANT CHANGE UP (ref 3.5–5.2)
ALP SERPL-CCNC: 66 U/L — SIGNIFICANT CHANGE UP (ref 30–115)
ALT FLD-CCNC: 65 U/L — HIGH (ref 0–41)
ANION GAP SERPL CALC-SCNC: 13 MMOL/L — SIGNIFICANT CHANGE UP (ref 7–14)
AST SERPL-CCNC: 31 U/L — SIGNIFICANT CHANGE UP (ref 0–41)
BASOPHILS # BLD AUTO: 0.05 K/UL — SIGNIFICANT CHANGE UP (ref 0–0.2)
BASOPHILS NFR BLD AUTO: 0.4 % — SIGNIFICANT CHANGE UP (ref 0–1)
BILIRUB SERPL-MCNC: <0.2 MG/DL — SIGNIFICANT CHANGE UP (ref 0.2–1.2)
BUN SERPL-MCNC: 24 MG/DL — HIGH (ref 10–20)
CALCIUM SERPL-MCNC: 9.7 MG/DL — SIGNIFICANT CHANGE UP (ref 8.4–10.5)
CHLORIDE SERPL-SCNC: 100 MMOL/L — SIGNIFICANT CHANGE UP (ref 98–110)
CO2 SERPL-SCNC: 23 MMOL/L — SIGNIFICANT CHANGE UP (ref 17–32)
CREAT SERPL-MCNC: 0.9 MG/DL — SIGNIFICANT CHANGE UP (ref 0.7–1.5)
D DIMER BLD IA.RAPID-MCNC: 503 NG/ML DDU — HIGH
EGFR: 101 ML/MIN/1.73M2 — SIGNIFICANT CHANGE UP
EOSINOPHIL # BLD AUTO: 0.02 K/UL — SIGNIFICANT CHANGE UP (ref 0–0.7)
EOSINOPHIL NFR BLD AUTO: 0.2 % — SIGNIFICANT CHANGE UP (ref 0–8)
FLUAV AG NPH QL: SIGNIFICANT CHANGE UP
FLUBV AG NPH QL: SIGNIFICANT CHANGE UP
GLUCOSE SERPL-MCNC: 129 MG/DL — HIGH (ref 70–99)
HCT VFR BLD CALC: 41.8 % — LOW (ref 42–52)
HGB BLD-MCNC: 14.7 G/DL — SIGNIFICANT CHANGE UP (ref 14–18)
IMM GRANULOCYTES NFR BLD AUTO: 2.6 % — HIGH (ref 0.1–0.3)
LYMPHOCYTES # BLD AUTO: 1.93 K/UL — SIGNIFICANT CHANGE UP (ref 1.2–3.4)
LYMPHOCYTES # BLD AUTO: 14.8 % — LOW (ref 20.5–51.1)
MCHC RBC-ENTMCNC: 30.6 PG — SIGNIFICANT CHANGE UP (ref 27–31)
MCHC RBC-ENTMCNC: 35.2 G/DL — SIGNIFICANT CHANGE UP (ref 32–37)
MCV RBC AUTO: 87.1 FL — SIGNIFICANT CHANGE UP (ref 80–94)
MONOCYTES # BLD AUTO: 0.86 K/UL — HIGH (ref 0.1–0.6)
MONOCYTES NFR BLD AUTO: 6.6 % — SIGNIFICANT CHANGE UP (ref 1.7–9.3)
NEUTROPHILS # BLD AUTO: 9.82 K/UL — HIGH (ref 1.4–6.5)
NEUTROPHILS NFR BLD AUTO: 75.4 % — HIGH (ref 42.2–75.2)
NRBC # BLD: 0 /100 WBCS — SIGNIFICANT CHANGE UP (ref 0–0)
PLATELET # BLD AUTO: 355 K/UL — SIGNIFICANT CHANGE UP (ref 130–400)
PMV BLD: 10 FL — SIGNIFICANT CHANGE UP (ref 7.4–10.4)
POTASSIUM SERPL-MCNC: 4.7 MMOL/L — SIGNIFICANT CHANGE UP (ref 3.5–5)
POTASSIUM SERPL-SCNC: 4.7 MMOL/L — SIGNIFICANT CHANGE UP (ref 3.5–5)
PROT SERPL-MCNC: 6.7 G/DL — SIGNIFICANT CHANGE UP (ref 6–8)
RBC # BLD: 4.8 M/UL — SIGNIFICANT CHANGE UP (ref 4.7–6.1)
RBC # FLD: 13.5 % — SIGNIFICANT CHANGE UP (ref 11.5–14.5)
RSV RNA NPH QL NAA+NON-PROBE: SIGNIFICANT CHANGE UP
SARS-COV-2 RNA SPEC QL NAA+PROBE: SIGNIFICANT CHANGE UP
SODIUM SERPL-SCNC: 136 MMOL/L — SIGNIFICANT CHANGE UP (ref 135–146)
TROPONIN T, HIGH SENSITIVITY RESULT: <6 NG/L — SIGNIFICANT CHANGE UP (ref 6–21)
WBC # BLD: 13.02 K/UL — HIGH (ref 4.8–10.8)
WBC # FLD AUTO: 13.02 K/UL — HIGH (ref 4.8–10.8)

## 2024-02-28 PROCEDURE — 36415 COLL VENOUS BLD VENIPUNCTURE: CPT

## 2024-02-28 PROCEDURE — 0241U: CPT

## 2024-02-28 PROCEDURE — 85379 FIBRIN DEGRADATION QUANT: CPT

## 2024-02-28 PROCEDURE — 99285 EMERGENCY DEPT VISIT HI MDM: CPT | Mod: FS

## 2024-02-28 PROCEDURE — 71275 CT ANGIOGRAPHY CHEST: CPT | Mod: 26,MC

## 2024-02-28 PROCEDURE — 85025 COMPLETE CBC W/AUTO DIFF WBC: CPT

## 2024-02-28 PROCEDURE — 80053 COMPREHEN METABOLIC PANEL: CPT

## 2024-02-28 PROCEDURE — 99285 EMERGENCY DEPT VISIT HI MDM: CPT | Mod: 25

## 2024-02-28 PROCEDURE — 93005 ELECTROCARDIOGRAM TRACING: CPT

## 2024-02-28 PROCEDURE — 71046 X-RAY EXAM CHEST 2 VIEWS: CPT

## 2024-02-28 PROCEDURE — 93010 ELECTROCARDIOGRAM REPORT: CPT

## 2024-02-28 PROCEDURE — 71046 X-RAY EXAM CHEST 2 VIEWS: CPT | Mod: 26

## 2024-02-28 PROCEDURE — 84484 ASSAY OF TROPONIN QUANT: CPT

## 2024-02-28 PROCEDURE — 71275 CT ANGIOGRAPHY CHEST: CPT | Mod: MC

## 2024-02-28 RX ORDER — ALBUTEROL 90 UG/1
1 AEROSOL, METERED ORAL ONCE
Refills: 0 | Status: DISCONTINUED | OUTPATIENT
Start: 2024-02-28 | End: 2024-02-28

## 2024-02-28 NOTE — ED PROVIDER NOTE - NSFOLLOWUPINSTRUCTIONS_ED_ALL_ED_FT
Please follow up with your primary care doctor and cardiologist in 1-3 days  Please be aware of any new or worsening signs or symptoms that should prompt your return to the ER.      Acute Cough    WHAT YOU NEED TO KNOW:    An acute cough can last up to 3 weeks. Common causes of an acute cough include a cold, allergies, or a lung infection.     DISCHARGE INSTRUCTIONS:    Return to the emergency department if:     You have trouble breathing or feel short of breath.      You cough up blood, or you see blood in your mucus.       You faint or feel weak or dizzy.       You have chest pain when you cough or take a deep breath.       You have new wheezing.     Contact your healthcare provider if:     You have a fever.       Your cough lasts longer than 4 weeks.       Your symptoms do not improve with treatment.       You have questions or concerns about your condition or care.     Medicines:     Medicines may be needed to stop the cough, decrease swelling in your airways, or help open your airways. Medicine may also be given to help you cough up mucus. Ask your healthcare provider what over-the-counter medicines you can take. If you have an infection caused by bacteria, you may need antibiotics.       Take your medicine as directed. Contact your healthcare provider if you think your medicine is not helping or if you have side effects. Tell him or her if you are allergic to any medicine. Keep a list of the medicines, vitamins, and herbs you take. Include the amounts, and when and why you take them. Bring the list or the pill bottles to follow-up visits. Carry your medicine list with you in case of an emergency.    Manage your symptoms:     Do not smoke and stay away from others who smoke. Nicotine and other chemicals in cigarettes and cigars can cause lung damage and make your cough worse. Ask your healthcare provider for information if you currently smoke and need help to quit. E-cigarettes or smokeless tobacco still contain nicotine. Talk to your healthcare provider before you use these products.       Drink extra liquids as directed. Liquids will help thin and loosen mucus so you can cough it up. Liquids will also help prevent dehydration. Examples of good liquids to drink include water, fruit juice, and broth. Do not drink liquids that contain caffeine. Caffeine can increase your risk for dehydration. Ask your healthcare provider how much liquid to drink each day.       Rest as directed. Do not do activities that make your cough worse, such as exercise.       Use a humidifier or vaporizer. Use a cool mist humidifier or a vaporizer to increase air moisture in your home. This may make it easier for you to breathe and help decrease your cough.       Eat 2 to 5 mL of honey 2 times each day. Honey can help thin mucus and decrease your cough.       Use cough drops or lozenges. These can help decrease throat irritation and your cough.     Follow up with your healthcare provider as directed: Write down your questions so you remember to ask them during your visits.        © Copyright [employer] 2019 All illustrations and images included in CareNotes are the copyrighted property of [employer] or flo[employer]do.       Nonspecific Chest Pain  Chest pain can be caused by many different conditions. There is always a chance that your pain could be related to something serious, such as a heart attack or a blood clot in your lungs. Chest pain can also be caused by conditions that are not life-threatening. If you have chest pain, it is very important to follow up with your health care provider.    What are the causes?  Causes of this condition include:    Heartburn.  Pneumonia or bronchitis.  Anxiety or stress.  Inflammation around your heart (pericarditis) or lung (pleuritis or pleurisy).  A blood clot in your lung.  A collapsed lung (pneumothorax). This can develop suddenly on its own (spontaneous pneumothorax) or from trauma to the chest.  Shingles infection (varicella-zoster virus).  Heart attack.  Damage to the bones, muscles, and cartilage that make up your chest wall. This can include:    Bruised bones due to injury.  Strained muscles or cartilage due to frequent or repeated coughing or overwork.  Fracture to one or more ribs.  Sore cartilage due to inflammation (costochondritis).      What increases the risk?  Risk factors for this condition may include:    Activities that increase your risk for trauma or injury to your chest.  Respiratory infections or conditions that cause frequent coughing.  Medical conditions or overeating that can cause heartburn.  Heart disease or family history of heart disease.  Conditions or health behaviors that increase your risk of developing a blood clot.  Having had chicken pox (varicella zoster).    What are the signs or symptoms?  Chest pain can feel like:    Burning or tingling on the surface of your chest or deep in your chest.  Crushing, pressure, aching, or squeezing pain.  Dull or sharp pain that is worse when you move, cough, or take a deep breath.  Pain that is also felt in your back, neck, shoulder, or arm, or pain that spreads to any of these areas.    Your chest pain may come and go, or it may stay constant.    How is this diagnosed?  Lab tests or other studies may be needed to find the cause of your pain. Your health care provider may have you take a test called an ECG (electrocardiogram). An ECG records your heartbeat patterns at the time the test is performed. You may also have other tests, such as:    Transthoracic echocardiogram (TTE). In this test, sound waves are used to create a picture of the heart structures and to look at how blood flows through your heart.  Transesophageal echocardiogram (ZEKE). This is a more advanced imaging test that takes images from inside your body. It allows your health care provider to see your heart in finer detail.  Cardiac monitoring. This allows your health care provider to monitor your heart rate and rhythm in real time.  Holter monitor. This is a portable device that records your heartbeat and can help to diagnose abnormal heartbeats. It allows your health care provider to track your heart activity for several days, if needed.  Stress tests. These can be done through exercise or by taking medicine that makes your heart beat more quickly.  Blood tests.  Other imaging tests.    How is this treated?  Treatment depends on what is causing your chest pain. Treatment may include:    Medicines. These may include:    Acid blockers for heartburn.  Anti-inflammatory medicine.  Pain medicine for inflammatory conditions.  Antibiotic medicine, if an infection is present.  Medicines to dissolve blood clots.  Medicines to treat coronary artery disease (CAD).    Supportive care for conditions that do not require medicines. This may include:    Resting.  Applying heat or cold packs to injured areas.  Limiting activities until pain decreases.      Follow these instructions at home:  Medicines     If you were prescribed an antibiotic, take it as told by your health care provider. Do not stop taking the antibiotic even if you start to feel better.  Take over-the-counter and prescription medicines only as told by your health care provider.  Lifestyle     Do not use any products that contain nicotine or tobacco, such as cigarettes and e-cigarettes. If you need help quitting, ask your health care provider.  Do not drink alcohol.  ImageMake lifestyle changes as directed by your health care provider. These may include:    Getting regular exercise. Ask your health care provider to suggest some activities that are safe for you.  Eating a heart-healthy diet. A registered dietitian can help you to learn healthy eating options.  Maintaining a healthy weight.  Managing diabetes, if necessary.  Reducing stress, such as with yoga or relaxation techniques.    General instructions     Avoid any activities that bring on chest pain.  If heartburn is the cause for your chest pain, raise (elevate) the head of your bed about 6 inches (15 cm) by putting blocks under the legs. Sleeping with more pillows does not effectively relieve heartburn because it only changes the position of your head.  Keep all follow-up visits as told by your health care provider. This is important. This includes any further testing if your chest pain does not go away.  Contact a health care provider if:  Your chest pain does not go away.  You have a rash with blisters on your chest.  You have a fever.  You have chills.  Get help right away if:  Your chest pain is worse.  You have a cough that gets worse, or you cough up blood.  You have severe pain in your abdomen.  You have severe weakness.  You faint.  You have sudden, unexplained chest discomfort.  You have sudden, unexplained discomfort in your arms, back, neck, or jaw.  You have shortness of breath at any time.  You suddenly start to sweat, or your skin gets clammy.  You feel nauseous or you vomit.  You suddenly feel light-headed or dizzy.  Your heart begins to beat quickly, or it feels like it is skipping beats.  These symptoms may represent a serious problem that is an emergency. Do not wait to see if the symptoms will go away. Get medical help right away. Call your local emergency services (911 in the [place] Do not drive yourself to the hospital.     This information is not intended to replace advice given to you by your health care provider. Make sure you discuss any questions you have with your health care provider.

## 2024-02-28 NOTE — ED PROVIDER NOTE - WHICH SHOWED
EKG: NSR, non-specific st-t abnormalities, no STEMI    Chest xray negative for pneumothorax, pneumonia, widened mediastinum, evidence of rib fractures, enlarged cardiac silhouette or any other emergent pathologies.

## 2024-02-28 NOTE — ED PROVIDER NOTE - DIFFERENTIAL DIAGNOSIS
Cough, dyspnea, likely viral etiology but consider ACS vs PE vs dissection vs tamponade vs esophageal rupture vs pneumothorax vs pneumonia vs fluid overload Differential Diagnosis

## 2024-02-28 NOTE — ED PROVIDER NOTE - CARE PROVIDER_API CALL
Vinicius Chaudhari  Internal Medicine  [address] [place] [address]  Phone: [phone]  Fax: [phone]  Follow Up Time: 1-3 Days    Gilbert Ramirez  Cardiology  [address] [place] [address], [facility] [address]  [place] [address]  Phone: [phone]  Fax: [phone]  Follow Up Time: 4-6 Days

## 2024-02-28 NOTE — ED PROVIDER NOTE - CLINICAL SUMMARY MEDICAL DECISION MAKING FREE TEXT BOX
Patient presented with cough, dyspnea, congestion as documented. Otherwise afebrile, Hd stable, well appearing. EKG obtained and non-ischemic without evidence of STEMI. Obtained labs which showed (+) elevated dimer but otherwise were grossly unremarkable including no significant leukocytosis, anemia, signs of dehydration/ANNETTE, transaminitis or significant electrolyte abnormalities. Trop negative. Chest xray negative for pneumothorax, pneumonia, widened mediastinum, evidence of rib fractures, enlarged cardiac silhouette or any other emergent pathologies. CTA chest negative for PE or any other emergent pathologies. Patient remained without recurrence of chest pain or abnormalities during monitoring in ED. Ambulatory without difficulty, tolerates PO. HEART Score 2, no concern clinically for dissection. Likely viral etiology. Given the above, will discharge home with outpatient follow up. Patient agreeable with plan. Agrees to return to ED for any new or worsening symptoms.

## 2024-02-28 NOTE — ED ADULT TRIAGE NOTE - CHIEF COMPLAINT QUOTE
Dry cough, chest tightness. Pt reports he did a hike recently and started having these symptoms after. Pmhx: DM, high cholesterol.

## 2024-02-28 NOTE — ED PROVIDER NOTE - PROVIDER TOKENS
PROVIDER:[TOKEN:[[id number]],FOLLOWUP:[1-3 Days]],PROVIDER:[TOKEN:[[id number]],FOLLOWUP:[4-6 Days]]

## 2024-02-28 NOTE — ED PROVIDER NOTE - PROGRESS NOTE DETAILS
patient feeling well, results thoroughly discussed with patient and family at bedside, educated on signs and symptoms to be aware of that should prompt return to the er. patient verbalizes understanding and agreeable with plan. patient to fu w cards and pmd.

## 2024-02-28 NOTE — ED PROVIDER NOTE - OBJECTIVE STATEMENT
53 year old male, past medical history hdl, who presents with chest pain. patient with recent hiking trip to [facility] with gradual onset of cough, sore throat, sinus congestion x9 days ago. patient with 19 hour flight travel back x7 days ago. patient started on steroids/abx with moderate improvement of sx, persistent cough and chest discomfort prompting ed eval. patient follows with dr flores, cardiologist, eval q2 years, +family hx cad. non-smoker. no fever, chills, hemoptysis, le swelling, back pain, abd pain, nausea/vomiting. 53 year old male, past medical history hdl, dm, who presents with chest pain. patient with recent hiking trip to [facility] with gradual onset of cough, sore throat, sinus congestion x9 days ago. patient with 19 hour flight travel back x7 days ago. patient started on steroids/abx with moderate improvement of sx, persistent cough and chest discomfort prompting ed eval. patient follows with dr flores, cardiologist, eval q2 years, +family hx cad. non-smoker. no fever, chills, hemoptysis, le swelling, back pain, abd pain, nausea/vomiting.

## 2024-02-28 NOTE — ED PROVIDER NOTE - PATIENT PORTAL LINK FT
You can access the FollowMyHealth Patient Portal offered by [facility] by registering at the following website: [email or web address]. By joining [facility]’s FollowMyHealth portal, you will also be able to view your health information using other applications (apps) compatible with our system.

## 2024-02-28 NOTE — ED PROVIDER NOTE - PHYSICAL EXAMINATION
CONSTITUTIONAL: Well-developed; well-nourished; in no acute distress, nontoxic appearing  SKIN: skin exam is warm and dry  ENT: MMM  CARD: S1, S2 normal, no murmur  RESP: No wheezes, rales or rhonchi. Good air movement bilaterally  ABD: soft; non-distended; non-tender.   EXT: Normal ROM. No le edema  NEURO: awake, alert, following commands, oriented, grossly unremarkable. No Focal deficits. GCS 15.   PSYCH: Cooperative, appropriate.

## 2024-04-18 NOTE — PROGRESS NOTE BEHAVIORAL HEALTH - NS ED BHA MSE GENERAL APPEARANCE
ASSESSMENT AND PLAN:  #erythrodermic drug eruption  favor from contrast, (diffuse redness of the skin affecting more than 90% of the body surface), other common causes of erythroderma in adults are psoriasis, atopic dermatitis -- less favored are cutaneous T-cell lymphoma (CTCL), pityriasis rubra pilaris (unlikely in this patient)  - Monitor for complications of erythroderma (fluid and electrolyte abnormalities, cardiac failure) are important, as temperature dysregulation, fluid loss, and heart failure can become a cause of mortality in patients. Secondary infections and sepsis can occur from breakdown of the skin barrier. pt has no skin denuding.  - less likely that this rash will progress to agep now, no pustules on exam today  - f/u biopsy 4/17  - for the intertriginous areas please cont nystatin cream BID mixed with triamcinolone 0.1% cream BID  - for the rest of the body please - START triamcinolone 0.1% ointment BID to affected areas (never to the face/groin/skin folds) for up to 2 weeks on, then 1 week off. Please dispense multiple tubes to cover body surface area.  - cont trend daily CBC w/ diff, monitor fever curve    #psoriasis  - for the psoriasis please – CONT  triamcinolone 0.1% ointment BID to affected areas (never to the face/groin/skin folds) for up to 2 weeks on, then 1 week off. Please dispense multiple tubes to cover body surface area.    #lymphedema  - agree with wound care, compression, elevation recommended    #dermatitis favor oral candidiasis but atypical  - white plaques in mouth  - f/u fungal culture  - please CONT nystatin swish and swallow 4x/day    The patient's chart was reviewed in addition to being seen and examined at bedside with the dermatology attending Dr. Shelby.  Recommendations were communicated with the primary team.    Please page [phone] with a 10-digit call-back number for further related questions.  Please re-consult Dermatology for any new or worsening developments.    Zeyad Dc MD  Resident Physician, PGY-2  [facility] Dermatology  Pager: [phone]  Office: [phone]
ASSESSMENT AND PLAN:  #erythrodermic drug eruption  favor from pantoprazole vs contrast, (diffuse redness of the skin affecting more than 90% of the body surface), other common causes of erythroderma in adults are psoriasis, atopic dermatitis -- less favored are cutaneous T-cell lymphoma (CTCL), pityriasis rubra pilaris (unlikely in this patient)  - Monitor for complications of erythroderma (fluid and electrolyte abnormalities, cardiac failure) are important, as temperature dysregulation, fluid loss, and heart failure can become a cause of mortality in patients. Secondary infections and sepsis can occur from breakdown of the skin barrier. pt has no skin denuding.  - less likely that this rash will progress to agep now, no pustules on exam today  - f/u biopsy 4/17  - for the intertriginous areas please cont nystatin cream BID mixed with triamcinolone 0.1% cream BID  - for the rest of the body please - START triamcinolone 0.1% ointment BID to affected areas (never to the face/groin/skin folds) for up to 2 weeks on, then 1 week off. Please dispense multiple tubes to cover body surface area.  - cont trend daily CBC w/ diff, monitor fever curve    #psoriasis  - for the psoriasis please – CONT  triamcinolone 0.1% ointment BID to affected areas (never to the face/groin/skin folds) for up to 2 weeks on, then 1 week off. Please dispense multiple tubes to cover body surface area.    #lymphedema  - agree with wound care, compression, elevation recommended    #dermatitis favor oral candidiasis but atypical  - white plaques in mouth  - f/u fungal culture  - please CONT nystatin swish and swallow 4x/day    The patient's chart was reviewed in addition to being seen and examined at bedside with the dermatology attending Dr. Shelby.  Recommendations were communicated with the primary team.    Please page [phone] with a 10-digit call-back number for further related questions.  Please re-consult Dermatology for any new or worsening developments.    Zeyad Dc MD  Resident Physician, PGY-2  [facility] Dermatology  Pager: [phone]  Office: [phone]
Well developed

## 2024-04-29 ENCOUNTER — APPOINTMENT (OUTPATIENT)
Dept: PULMONOLOGY | Facility: CLINIC | Age: 54
End: 2024-04-29

## 2024-07-03 ENCOUNTER — APPOINTMENT (OUTPATIENT)
Dept: NEUROPSYCHOLOGY | Facility: CLINIC | Age: 54
End: 2024-07-03
Payer: MEDICARE

## 2024-07-03 PROCEDURE — 90791 PSYCH DIAGNOSTIC EVALUATION: CPT

## 2024-07-09 NOTE — ED ADULT TRIAGE NOTE - WEIGHT IN LBS
Lvm stating that Dr. 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 I stated that if that time doesn't work to give us a call back and we will reschedule him for a different date and time.   111

## 2024-07-15 ENCOUNTER — APPOINTMENT (OUTPATIENT)
Dept: NEUROPSYCHOLOGY | Facility: CLINIC | Age: 54
End: 2024-07-15
Payer: MEDICARE

## 2024-07-15 PROCEDURE — 90834 PSYTX W PT 45 MINUTES: CPT

## 2024-08-05 ENCOUNTER — APPOINTMENT (OUTPATIENT)
Dept: NEUROPSYCHOLOGY | Facility: CLINIC | Age: 54
End: 2024-08-05

## 2024-08-12 ENCOUNTER — APPOINTMENT (OUTPATIENT)
Dept: NEUROPSYCHOLOGY | Facility: CLINIC | Age: 54
End: 2024-08-12

## 2024-08-19 ENCOUNTER — APPOINTMENT (OUTPATIENT)
Dept: NEUROPSYCHOLOGY | Facility: CLINIC | Age: 54
End: 2024-08-19

## 2024-08-23 NOTE — PHYSICAL EXAM
[FreeTextEntry1] : P Detail Level: Zone General Sunscreen Counseling: I recommended a broad spectrum sunscreen with a SPF of 30 or higher.  I explained that SPF 30 sunscreens block approximately 97 percent of the sun's harmful rays.  Sunscreens should be applied at least 15 minutes prior to expected sun exposure and then every 2 hours after that as long as sun exposure continues. If swimming or exercising sunscreen should be reapplied every 45 minutes to an hour after getting wet or sweating.  One ounce, or the equivalent of a shot glass full of sunscreen, is adequate to protect the skin not covered by a bathing suit. I also recommended a lip balm with a sunscreen as well. Sun protective clothing can be used in lieu of sunscreen but must be worn the entire time you are exposed to the sun's rays.

## 2024-08-26 ENCOUNTER — APPOINTMENT (OUTPATIENT)
Dept: NEUROPSYCHOLOGY | Facility: CLINIC | Age: 54
End: 2024-08-26

## 2025-05-16 ENCOUNTER — OUTPATIENT (OUTPATIENT)
Dept: OUTPATIENT SERVICES | Facility: HOSPITAL | Age: 55
LOS: 1 days | End: 2025-05-16
Payer: MEDICARE

## 2025-05-16 DIAGNOSIS — I51.9 HEART DISEASE, UNSPECIFIED: ICD-10-CM

## 2025-05-16 DIAGNOSIS — Z98.890 OTHER SPECIFIED POSTPROCEDURAL STATES: Chronic | ICD-10-CM

## 2025-05-16 DIAGNOSIS — Z00.8 ENCOUNTER FOR OTHER GENERAL EXAMINATION: ICD-10-CM

## 2025-05-16 PROCEDURE — 75574 CT ANGIO HRT W/3D IMAGE: CPT | Mod: 26

## 2025-05-16 PROCEDURE — 75574 CT ANGIO HRT W/3D IMAGE: CPT

## 2025-05-17 DIAGNOSIS — I51.9 HEART DISEASE, UNSPECIFIED: ICD-10-CM

## 2025-06-06 ENCOUNTER — INPATIENT (INPATIENT)
Facility: HOSPITAL | Age: 55
LOS: 1 days | Discharge: HOME | DRG: 918 | End: 2025-06-08
Attending: INTERNAL MEDICINE | Admitting: INTERNAL MEDICINE
Payer: MEDICARE

## 2025-06-06 VITALS
OXYGEN SATURATION: 95 % | TEMPERATURE: 98 F | SYSTOLIC BLOOD PRESSURE: 107 MMHG | HEART RATE: 67 BPM | DIASTOLIC BLOOD PRESSURE: 74 MMHG | WEIGHT: 210.1 LBS | RESPIRATION RATE: 16 BRPM

## 2025-06-06 DIAGNOSIS — T50.901A POISONING BY UNSPECIFIED DRUGS, MEDICAMENTS AND BIOLOGICAL SUBSTANCES, ACCIDENTAL (UNINTENTIONAL), INITIAL ENCOUNTER: ICD-10-CM

## 2025-06-06 DIAGNOSIS — Z98.890 OTHER SPECIFIED POSTPROCEDURAL STATES: Chronic | ICD-10-CM

## 2025-06-06 LAB
ALBUMIN SERPL ELPH-MCNC: 4.3 G/DL — SIGNIFICANT CHANGE UP (ref 3.5–5.2)
ALP SERPL-CCNC: 50 U/L — SIGNIFICANT CHANGE UP (ref 30–115)
ALT FLD-CCNC: 31 U/L — SIGNIFICANT CHANGE UP (ref 0–41)
ANION GAP SERPL CALC-SCNC: 12 MMOL/L — SIGNIFICANT CHANGE UP (ref 7–14)
APAP SERPL-MCNC: <5 UG/ML — LOW (ref 10–30)
APTT BLD: 32.1 SEC — SIGNIFICANT CHANGE UP (ref 27–39.2)
AST SERPL-CCNC: 27 U/L — SIGNIFICANT CHANGE UP (ref 0–41)
BASE EXCESS BLDV CALC-SCNC: -0.6 MMOL/L — SIGNIFICANT CHANGE UP (ref -2–3)
BASE EXCESS BLDV CALC-SCNC: 0.7 MMOL/L — SIGNIFICANT CHANGE UP (ref -2–3)
BASOPHILS # BLD AUTO: 0.05 K/UL — SIGNIFICANT CHANGE UP (ref 0–0.2)
BASOPHILS NFR BLD AUTO: 0.7 % — SIGNIFICANT CHANGE UP (ref 0–1)
BILIRUB SERPL-MCNC: 0.4 MG/DL — SIGNIFICANT CHANGE UP (ref 0.2–1.2)
BUN SERPL-MCNC: 13 MG/DL — SIGNIFICANT CHANGE UP (ref 10–20)
CA-I SERPL-SCNC: 1.2 MMOL/L — SIGNIFICANT CHANGE UP (ref 1.15–1.33)
CA-I SERPL-SCNC: 1.23 MMOL/L — SIGNIFICANT CHANGE UP (ref 1.15–1.33)
CALCIUM SERPL-MCNC: 9.5 MG/DL — SIGNIFICANT CHANGE UP (ref 8.4–10.5)
CHLORIDE SERPL-SCNC: 106 MMOL/L — SIGNIFICANT CHANGE UP (ref 98–110)
CO2 SERPL-SCNC: 22 MMOL/L — SIGNIFICANT CHANGE UP (ref 17–32)
CREAT SERPL-MCNC: 0.9 MG/DL — SIGNIFICANT CHANGE UP (ref 0.7–1.5)
EGFR: 100 ML/MIN/1.73M2 — SIGNIFICANT CHANGE UP
EGFR: 100 ML/MIN/1.73M2 — SIGNIFICANT CHANGE UP
EOSINOPHIL # BLD AUTO: 0.19 K/UL — SIGNIFICANT CHANGE UP (ref 0–0.7)
EOSINOPHIL NFR BLD AUTO: 2.5 % — SIGNIFICANT CHANGE UP (ref 0–8)
ETHANOL SERPL-MCNC: <10 MG/DL — SIGNIFICANT CHANGE UP
FLUAV AG NPH QL: SIGNIFICANT CHANGE UP
FLUBV AG NPH QL: SIGNIFICANT CHANGE UP
GAS PNL BLDV: 136 MMOL/L — SIGNIFICANT CHANGE UP (ref 136–145)
GAS PNL BLDV: 138 MMOL/L — SIGNIFICANT CHANGE UP (ref 136–145)
GAS PNL BLDV: SIGNIFICANT CHANGE UP
GLUCOSE BLDC GLUCOMTR-MCNC: 103 MG/DL — HIGH (ref 70–99)
GLUCOSE BLDC GLUCOMTR-MCNC: 87 MG/DL — SIGNIFICANT CHANGE UP (ref 70–99)
GLUCOSE BLDC GLUCOMTR-MCNC: 98 MG/DL — SIGNIFICANT CHANGE UP (ref 70–99)
GLUCOSE SERPL-MCNC: 138 MG/DL — HIGH (ref 70–99)
HCO3 BLDV-SCNC: 25 MMOL/L — SIGNIFICANT CHANGE UP (ref 22–29)
HCO3 BLDV-SCNC: 27 MMOL/L — SIGNIFICANT CHANGE UP (ref 22–29)
HCT VFR BLD CALC: 40.6 % — LOW (ref 42–52)
HCT VFR BLDA CALC: 44 % — SIGNIFICANT CHANGE UP (ref 39–51)
HCT VFR BLDA CALC: 45 % — SIGNIFICANT CHANGE UP (ref 39–51)
HGB BLD CALC-MCNC: 14.7 G/DL — SIGNIFICANT CHANGE UP (ref 12.6–17.4)
HGB BLD CALC-MCNC: 14.9 G/DL — SIGNIFICANT CHANGE UP (ref 12.6–17.4)
HGB BLD-MCNC: 14 G/DL — SIGNIFICANT CHANGE UP (ref 14–18)
IMM GRANULOCYTES NFR BLD AUTO: 0.4 % — HIGH (ref 0.1–0.3)
INR BLD: 0.89 RATIO — SIGNIFICANT CHANGE UP (ref 0.65–1.3)
LACTATE BLDV-MCNC: 1.9 MMOL/L — SIGNIFICANT CHANGE UP (ref 0.5–2)
LACTATE BLDV-MCNC: 2.3 MMOL/L — HIGH (ref 0.5–2)
LACTATE SERPL-SCNC: 1.9 MMOL/L — SIGNIFICANT CHANGE UP (ref 0.7–2)
LYMPHOCYTES # BLD AUTO: 2.74 K/UL — SIGNIFICANT CHANGE UP (ref 1.2–3.4)
LYMPHOCYTES # BLD AUTO: 36.4 % — SIGNIFICANT CHANGE UP (ref 20.5–51.1)
MCHC RBC-ENTMCNC: 30 PG — SIGNIFICANT CHANGE UP (ref 27–31)
MCHC RBC-ENTMCNC: 34.5 G/DL — SIGNIFICANT CHANGE UP (ref 32–37)
MCV RBC AUTO: 86.9 FL — SIGNIFICANT CHANGE UP (ref 80–94)
MONOCYTES # BLD AUTO: 0.65 K/UL — HIGH (ref 0.1–0.6)
MONOCYTES NFR BLD AUTO: 8.6 % — SIGNIFICANT CHANGE UP (ref 1.7–9.3)
MRSA PCR RESULT.: NEGATIVE — SIGNIFICANT CHANGE UP
NEUTROPHILS # BLD AUTO: 3.86 K/UL — SIGNIFICANT CHANGE UP (ref 1.4–6.5)
NEUTROPHILS NFR BLD AUTO: 51.4 % — SIGNIFICANT CHANGE UP (ref 42.2–75.2)
NRBC BLD AUTO-RTO: 0 /100 WBCS — SIGNIFICANT CHANGE UP (ref 0–0)
PCO2 BLDV: 43 MMHG — SIGNIFICANT CHANGE UP (ref 42–55)
PCO2 BLDV: 49 MMHG — SIGNIFICANT CHANGE UP (ref 42–55)
PH BLDV: 7.35 — SIGNIFICANT CHANGE UP (ref 7.32–7.43)
PH BLDV: 7.37 — SIGNIFICANT CHANGE UP (ref 7.32–7.43)
PLATELET # BLD AUTO: 248 K/UL — SIGNIFICANT CHANGE UP (ref 130–400)
PMV BLD: 9.5 FL — SIGNIFICANT CHANGE UP (ref 7.4–10.4)
PO2 BLDV: 52 MMHG — HIGH (ref 25–45)
PO2 BLDV: 60 MMHG — HIGH (ref 25–45)
POTASSIUM BLDV-SCNC: 4.1 MMOL/L — SIGNIFICANT CHANGE UP (ref 3.5–5.1)
POTASSIUM BLDV-SCNC: 4.2 MMOL/L — SIGNIFICANT CHANGE UP (ref 3.5–5.1)
POTASSIUM SERPL-MCNC: 4.4 MMOL/L — SIGNIFICANT CHANGE UP (ref 3.5–5)
POTASSIUM SERPL-SCNC: 4.4 MMOL/L — SIGNIFICANT CHANGE UP (ref 3.5–5)
PROT SERPL-MCNC: 6.8 G/DL — SIGNIFICANT CHANGE UP (ref 6–8)
PROTHROM AB SERPL-ACNC: 10.5 SEC — SIGNIFICANT CHANGE UP (ref 9.95–12.87)
RBC # BLD: 4.67 M/UL — LOW (ref 4.7–6.1)
RBC # FLD: 12.6 % — SIGNIFICANT CHANGE UP (ref 11.5–14.5)
RSV RNA NPH QL NAA+NON-PROBE: SIGNIFICANT CHANGE UP
SALICYLATES SERPL-MCNC: <0.3 MG/DL — LOW (ref 4–30)
SAO2 % BLDV: 85.1 % — SIGNIFICANT CHANGE UP (ref 67–88)
SAO2 % BLDV: 90.3 % — HIGH (ref 67–88)
SARS-COV-2 RNA SPEC QL NAA+PROBE: SIGNIFICANT CHANGE UP
SODIUM SERPL-SCNC: 140 MMOL/L — SIGNIFICANT CHANGE UP (ref 135–146)
SOURCE RESPIRATORY: SIGNIFICANT CHANGE UP
WBC # BLD: 7.52 K/UL — SIGNIFICANT CHANGE UP (ref 4.8–10.8)
WBC # FLD AUTO: 7.52 K/UL — SIGNIFICANT CHANGE UP (ref 4.8–10.8)

## 2025-06-06 PROCEDURE — 83036 HEMOGLOBIN GLYCOSYLATED A1C: CPT

## 2025-06-06 PROCEDURE — 99291 CRITICAL CARE FIRST HOUR: CPT

## 2025-06-06 PROCEDURE — 80346 BENZODIAZEPINES1-12: CPT

## 2025-06-06 PROCEDURE — 87641 MR-STAPH DNA AMP PROBE: CPT

## 2025-06-06 PROCEDURE — 80053 COMPREHEN METABOLIC PANEL: CPT

## 2025-06-06 PROCEDURE — 0241U: CPT

## 2025-06-06 PROCEDURE — 87640 STAPH A DNA AMP PROBE: CPT

## 2025-06-06 PROCEDURE — 85027 COMPLETE CBC AUTOMATED: CPT

## 2025-06-06 PROCEDURE — 99223 1ST HOSP IP/OBS HIGH 75: CPT | Mod: AI

## 2025-06-06 PROCEDURE — 36415 COLL VENOUS BLD VENIPUNCTURE: CPT

## 2025-06-06 PROCEDURE — 83735 ASSAY OF MAGNESIUM: CPT

## 2025-06-06 PROCEDURE — 99222 1ST HOSP IP/OBS MODERATE 55: CPT

## 2025-06-06 PROCEDURE — 84100 ASSAY OF PHOSPHORUS: CPT

## 2025-06-06 PROCEDURE — 80307 DRUG TEST PRSMV CHEM ANLYZR: CPT

## 2025-06-06 PROCEDURE — 80354 DRUG SCREENING FENTANYL: CPT

## 2025-06-06 PROCEDURE — 82962 GLUCOSE BLOOD TEST: CPT

## 2025-06-06 RX ORDER — DEXTROSE 50 % IN WATER 50 %
25 SYRINGE (ML) INTRAVENOUS ONCE
Refills: 0 | Status: DISCONTINUED | OUTPATIENT
Start: 2025-06-06 | End: 2025-06-08

## 2025-06-06 RX ORDER — DEXTROSE 50 % IN WATER 50 %
15 SYRINGE (ML) INTRAVENOUS ONCE
Refills: 0 | Status: DISCONTINUED | OUTPATIENT
Start: 2025-06-06 | End: 2025-06-08

## 2025-06-06 RX ORDER — ENOXAPARIN SODIUM 100 MG/ML
40 INJECTION SUBCUTANEOUS EVERY 24 HOURS
Refills: 0 | Status: DISCONTINUED | OUTPATIENT
Start: 2025-06-06 | End: 2025-06-08

## 2025-06-06 RX ORDER — GLUCAGON 3 MG/1
1 POWDER NASAL ONCE
Refills: 0 | Status: DISCONTINUED | OUTPATIENT
Start: 2025-06-06 | End: 2025-06-08

## 2025-06-06 RX ORDER — SODIUM CHLORIDE 9 G/1000ML
1000 INJECTION, SOLUTION INTRAVENOUS ONCE
Refills: 0 | Status: COMPLETED | OUTPATIENT
Start: 2025-06-06 | End: 2025-06-06

## 2025-06-06 RX ORDER — SODIUM CHLORIDE 9 G/1000ML
1000 INJECTION, SOLUTION INTRAVENOUS
Refills: 0 | Status: DISCONTINUED | OUTPATIENT
Start: 2025-06-06 | End: 2025-06-08

## 2025-06-06 RX ORDER — DEXTROSE 50 % IN WATER 50 %
12.5 SYRINGE (ML) INTRAVENOUS ONCE
Refills: 0 | Status: DISCONTINUED | OUTPATIENT
Start: 2025-06-06 | End: 2025-06-08

## 2025-06-06 RX ORDER — INSULIN GLARGINE-YFGN 100 [IU]/ML
30 INJECTION, SOLUTION SUBCUTANEOUS AT BEDTIME
Refills: 0 | Status: DISCONTINUED | OUTPATIENT
Start: 2025-06-06 | End: 2025-06-08

## 2025-06-06 RX ORDER — INSULIN LISPRO 100 U/ML
10 INJECTION, SOLUTION INTRAVENOUS; SUBCUTANEOUS
Refills: 0 | Status: DISCONTINUED | OUTPATIENT
Start: 2025-06-06 | End: 2025-06-08

## 2025-06-06 RX ORDER — SODIUM CHLORIDE 9 G/1000ML
1000 INJECTION, SOLUTION INTRAVENOUS
Refills: 0 | Status: DISCONTINUED | OUTPATIENT
Start: 2025-06-06 | End: 2025-06-06

## 2025-06-06 RX ORDER — INSULIN LISPRO 100 U/ML
INJECTION, SOLUTION INTRAVENOUS; SUBCUTANEOUS
Refills: 0 | Status: DISCONTINUED | OUTPATIENT
Start: 2025-06-06 | End: 2025-06-08

## 2025-06-06 RX ADMIN — SODIUM CHLORIDE 1000 MILLILITER(S): 9 INJECTION, SOLUTION INTRAVENOUS at 08:31

## 2025-06-06 RX ADMIN — ENOXAPARIN SODIUM 40 MILLIGRAM(S): 100 INJECTION SUBCUTANEOUS at 17:31

## 2025-06-06 NOTE — CONSULT NOTE ADULT - ASSESSMENT
Sedative-hypnotics include ethanol, benzodiazepines, barbiturates, baclofen, GHB and other MCKENZIE agonists. Toxidrome includes somnolence, sedation, bradycardia, and/or hypotension. It usually does not cause as much respiratory depression, miosis, or hypoactive bowel sounds as the opioid toxidrome. Treatment is largely supportive. Specific antidotes for specific drug classes such as flumazenil for benzodiazapine overdose can be considered on a case-by-case basis.   #Recommendations  - Supportive care, please observe until pt returns to mental status baseline or clinically sober.   -IF RR is persistently reduces (<12-10) then can trial low does Naloxone, if no response trial low dose flumazenil.   ----Naloxone low dose: - Recommend initial naloxone dose of 0.04 mg IV with repeat doses as needed every 3 minutes for reversal of spontaneous respiration. For persistent findings, can escalate up to naloxone 0.4-mg and 2-mg doses.   ----Flumazenil low dose: Recommend small alloquots at 0.1 mg over 30sec, if no improvement in RR or mental status after 1 minute then repeat until clinical implement or until you reach a total of 1mg.   - Obtain routine toxicological labs including CBC, CMP, serum acetaminophen, salicylate, ethanol, and EKG   - If asymptomatic with normal vitals signs and labs at end of observation period, cleared from acute toxicological standpoint  - Further medical and/or psychiatric care per primary team      Thank you for involving us in the care of this patient. Assessment and plan discussed with toxicology attending Dr. Calderon Babin. Please do not hesitate to reach out to the toxicology team for any further questions or concerns.    The On-Call Toxicology Fellow can be reached 24/7 via Pager #[phone]  Please send a 10 digit call back # as [place] cover multiple hospitals    Shira Hardin MD  Toxicology Fellow  PGY-4             Sedative-hypnotics include ethanol, benzodiazepines, barbiturates, baclofen, GHB and other MCKENZIE agonists. Toxidrome includes somnolence, sedation, bradycardia, and/or hypotension. It usually does not cause as much respiratory depression, miosis, or hypoactive bowel sounds as the opioid toxidrome. Treatment is largely supportive. Specific antidotes for specific drug classes such as flumazenil for benzodiazapine overdose can be considered on a case-by-case basis.   #Recommendations  - Supportive care, please observe on cardiac monitoring and end tidal CO2 until pt returns to mental status baseline or clinically sober.   -IF RR is persistently reduced (<12-10) then can trial low does Naloxone, if no response trial low dose flumazenil.   ----Naloxone low dose: - Recommend initial naloxone dose of 0.04 mg IV with repeat doses as needed every 3 minutes for reversal of spontaneous respiration. For persistent findings, can escalate up to naloxone 0.4-mg and 2-mg doses.   ----Flumazenil low dose: Recommend small alloquots at 0.1 mg over 30sec, if no improvement in RR or mental status after 1 minute then repeat until clinical implement or until you reach a total of 1mg.   - Obtain routine toxicological labs including CBC, CMP, serum acetaminophen, salicylate, ethanol, and EKG   - If asymptomatic with normal vitals signs and labs at end of observation period, cleared from acute toxicological standpoint  - Further medical and/or psychiatric care per primary team      Thank you for involving us in the care of this patient. Assessment and plan discussed with toxicology attending Dr. Calderon Babin. Please do not hesitate to reach out to the toxicology team for any further questions or concerns.    The On-Call Toxicology Fellow can be reached 24/7 via Pager #[phone]  Please send a 10 digit call back # as [place] cover multiple hospitals    Shira Hardin MD  Toxicology Fellow  PGY-4

## 2025-06-06 NOTE — ED PROVIDER NOTE - OBJECTIVE STATEMENT
Patient is a 54-year-old male presents for evaluation after taking 24 clonazepam tablets between 1 mg and 0.5 states that people are watching him patient found by his wife at approximately between 6 and 630 who then drove the patient to the ER for further evaluation patient denies any symptoms at this time denies headache visual changes neck pain chest pain shortness of breath abdominal pain back pain fevers chills although patient denies any homicidal this was done and intent to harm himself

## 2025-06-06 NOTE — ED PROVIDER NOTE - PHYSICAL EXAMINATION
Patient is normocephalic atraumatic pupils 2 mm equal pupils equally round reactive light accommodation extraocular muscles intact oropharynx clear chest clear to auscultation bilaterally abdomen soft nontender nondistended bowel sounds positive patient is moving all 4 extremities

## 2025-06-06 NOTE — CONSULT NOTE ADULT - ASSESSMENT
IMPRESSION:    Drug Overdose with Clonezapam, Intentional    HO T2DM  HO HLD  HO Paranoid Schizophrenia  HO Depression        PLAN:      CNS: avoid sedation, End Tidal C02 monitoring     HEENT: Oral care    PULMONARY:  HOB @ 45 degrees.  Aspiration precautions, wean oxygen, CXR, if RR reduced (<10) then can trial low does Naloxone, if no response trial low dose flumazenil.     CARDIOVASCULAR: avoid volume overload, MAP adequate, f/u EKG and troponin      GI: GI prophylaxis. NPO for now, SLP eval     RENAL:  Follow up lytes. Correct as needed, f/u UO, f/u Urine tox screen, serum acetaminophen, salicylate, ethanol noted      INFECTIOUS DISEASE: Follow up cultures, procalcitonin, RVP, Watch off Abx for now     HEMATOLOGICAL:  DVT prophylaxis. f/u US duplex LE     ENDOCRINE:  Follow up FS.  Insulin protocol if needed.    MUSCULOSKELETAL: ambulate as tolerated    SDU for now              IMPRESSION:    Drug Overdose with Clonezapam, Intentional    HO T2DM  HO HLD  HO Paranoid Schizophrenia  HO Depression        PLAN:      CNS: avoid sedation, End Tidal C02 monitoring, Consult Psych, One on one sitter      HEENT: Oral care    PULMONARY:  HOB @ 45 degrees.  Aspiration precautions, wean oxygen, CXR, if RR reduced (<10) then can trial low does Naloxone, if no response trial low dose flumazenil.     CARDIOVASCULAR: avoid volume overload, MAP adequate, f/u EKG and troponin      GI: GI prophylaxis. NPO for now, SLP eval     RENAL:  Follow up lytes. Correct as needed, f/u UO, f/u Urine tox screen, serum acetaminophen, salicylate, ethanol noted      INFECTIOUS DISEASE: Follow up cultures, procalcitonin, RVP, Watch off Abx for now     HEMATOLOGICAL:  DVT prophylaxis. f/u US duplex LE     ENDOCRINE:  Follow up FS.  Insulin protocol if needed.    MUSCULOSKELETAL: ambulate as tolerated    SDU for now              IMPRESSION:    Drug Overdose with Clonezapam, Intentional ?  HO T2DM  HO HLD  HO Paranoid Schizophrenia  HO Depression      PLAN:    CNS: avoid sedation, End Tidal C02 monitoring, Consult Psych, One on one sit. Urine and serum drug screen.     HEENT: Oral care    PULMONARY:  HOB @ 45 degrees.  Aspiration precautions, wean oxygen, CXR. No hypercapnia on VBG.     CARDIOVASCULAR: avoid volume overload, MAP adequate, f/u EKG and troponin; gental IV hydration while NPO.     GI: GI prophylaxis. NPO for now, SLP eval     RENAL:  Follow up lytes. Correct as needed, f/u UO. May need rahman.     INFECTIOUS DISEASE: Follow up cultures, procalcitonin, RVP, Watch off Abx for now     HEMATOLOGICAL:  DVT prophylaxis. f/u US duplex LE     ENDOCRINE:  Follow up FS.  Insulin protocol if needed.    MUSCULOSKELETAL: Fall precautions    SDU for now; possible DG in am

## 2025-06-06 NOTE — H&P ADULT - NSHPLABSRESULTS_GEN_ALL_CORE
LABS:                         14.0   7.52  )-----------( 248      ( 06 Jun 2025 07:29 )             40.6     06-06    140  |  106  |  13  ----------------------------<  138[H]  4.4   |  22  |  0.9    Ca    9.5      06 Jun 2025 07:29    TPro  6.8  /  Alb  4.3  /  TBili  0.4  /  DBili  x   /  AST  27  /  ALT  31  /  AlkPhos  50  06-06    PT/INR - ( 06 Jun 2025 07:29 )   PT: 10.50 sec;   INR: 0.89 ratio         PTT - ( 06 Jun 2025 07:29 )  PTT:32.1 sec  Urinalysis Basic - ( 06 Jun 2025 07:29 )    Color: x / Appearance: x / SG: x / pH: x  Gluc: 138 mg/dL / Ketone: x  / Bili: x / Urobili: x   Blood: x / Protein: x / Nitrite: x   Leuk Esterase: x / RBC: x / WBC x   Sq Epi: x / Non Sq Epi: x / Bacteria: x            Lactate, Blood: 1.9 mmol/L (06-06 @ 07:29)      RADIOLOGY, EKG & ADDITIONAL TESTS: Reviewed.

## 2025-06-06 NOTE — ED PROVIDER NOTE - PROGRESS NOTE DETAILS
patient re-evaluated sleeping but arousable he is able to ask and answer relevant questions I will continue to eligioor at this petr e LILY-- on reeval pt still sleepy but easily arousable, answering questionsa ppropriately. RR is 13-15 on endtidal monitoring, BP has been consistently systolic >100, HR 50s-60s. Discussed with wife at bedside plan for admission for observation until patient is clinically sober and able to speak with psychiatry, she and patient are agreeable with the plan.

## 2025-06-06 NOTE — H&P ADULT - NSHPPHYSICALEXAM_GEN_ALL_CORE
CONSTITUTIONAL: non-toxic, NAD  SKIN: Warm dry  HEAD: NCAT  EYES: EOMI, PERRL  ENT: Moist mucous membranes  NECK: Supple; non tender.  CARD: RRR  RESP: clear to ausculation b/l.  No rales, rhonchi, or wheezing.  ABD: soft, non-tender, non-distended  EXT: Full ROM, no bony tenderness, no pedal edema, no calf tenderness  NEURO: normal motor. normal sensory. CN II-XII grossly intact.   PSYCH: Cooperative

## 2025-06-06 NOTE — H&P ADULT - HISTORY OF PRESENT ILLNESS
54y M with PMH of Type 2 Diabetes mellitus, high blood cholesterol, paranoid schizophrenia presented to the ED for evaluation after taking 24 clonazepam tablets between 1 mg and 0.5. States that people are watching him. Patient found by his wife at approximately between 6 and 630 who then drove the patient to the ER for further evaluation. Patient denies any symptoms at this time. Denies headache, visual changes, neck pain, chest pain, shortness of breath, abdominal pain, back pain, fevers or chills.      ED vitals:   T(F): 97.6 (06-06 @ 08:10), Max: 97.7 (06-06 @ 07:11)  HR: 59 (06-06 @ 13:16) (53 - 75)  BP: 127/80 (06-06 @ 13:16) (102/67 - 127/80)  RR: 18 (06-06 @ 13:16) (16 - 18)  SpO2: 99% (06-06 @ 13:16) (95% - 99%)     In the ED, patient evaluated by toxicology. Received 1 LR bolus and placed on constant observation. Patient admitted to SDU for overdose.

## 2025-06-06 NOTE — ED PROVIDER NOTE - ATTENDING CONTRIBUTION TO CARE
I have personally performed a history and physical exam on this patient and personally directed the management of the patient. Patient is a 54-year-old male presents for evaluation after taking 24 clonazepam tablets between 1 mg and 0.5 states that people are watching him patient found by his wife at approximately between 6 and 630 who then drove the patient to the ER for further evaluation patient denies any symptoms at this time denies headache visual changes neck pain chest pain shortness of breath abdominal pain back pain fevers chills although patient denies any homicidal this was done and intent to harm himself        Patient is normocephalic atraumatic pupils 2 mm equal pupils equally round reactive light accommodation extraocular muscles intact oropharynx clear chest clear to auscultation bilaterally abdomen soft nontender nondistended bowel sounds positive patient is moving all 4 extremities    Patient is somnolent but arousable able to answer questions appropriately currently no signs of nystagmus no signs of clonus    Assessment plan patient presents for evaluation of overdose of benzodiazepine currently protecting airway no respiratory distress Sleepy but arousable speaking in full sentences we obtain EKG per my independent evaluation not consistent with STEMI not consistent with QT prolongation not consistent with arrhythmia in addition patient placed on a cardiac monitor as well as end-tidal CO2 currently averaging between 35 and 45 with a rate of between 12 and 16    We consulted toxicology will follow recommendations plan admission updated the patient and wife who agree with plan of care

## 2025-06-06 NOTE — ED PROVIDER NOTE - PRINCIPAL DIAGNOSIS
Pt arrived by EMS with complaints of paranoia and a mental health evaluation. Pt states he hasn't been taking his meds by choice. Pt states \" I have them. I just don't take them. \" Pt states he feels like people are going to kill me. Per EMS, pt's girlfriend called squad due to pt not sleeping for days. Overdose

## 2025-06-06 NOTE — CONSULT NOTE ADULT - SUBJECTIVE AND OBJECTIVE BOX
HPI:   Patient is a 54-year-old male who presents for evaluation after taking 24 clonazepam tablets between 1 mg and 0.5. states that people are watching him patient found by his wife at approximately between 6 and 630 who then drove the patient to the ER for further evaluation. patient denies any symptoms at this time denies headache visual changes neck pain chest pain shortness of breath abdominal pain back pain fevers chills.    PAST MEDICAL & SURGICAL HISTORY:    Diabetes mellitus, type 2  High blood cholesterol  Paranoid schizophrenia  S/P knee surgery      FAMILY HISTORY:  :  No known cardiovacular family hisotry     Review Of Systems:     All ROS are negative except per HPI       Allergies    No Known Allergies      PHYSICAL EXAM    ICU Vital Signs Last 24 Hrs  T(C): 36.4 (06 Jun 2025 08:10), Max: 36.5 (06 Jun 2025 07:11)  T(F): 97.6 (06 Jun 2025 08:10), Max: 97.7 (06 Jun 2025 07:11)  HR: 60 (06 Jun 2025 10:37) (53 - 75)  BP: 114/77 (06 Jun 2025 10:37) (102/67 - 124/65)  BP(mean): --  ABP: --  ABP(mean): --  RR: 16 (06 Jun 2025 10:37) (16 - 16)  SpO2: 98% (06 Jun 2025 10:37) (95% - 98%)    O2 Parameters below as of 06 Jun 2025 10:37  Patient On (Oxygen Delivery Method): nasal cannula        CONSTITUTIONAL:  NAD    ENT:   Airway patent,   Mouth with normal mucosa.     CARDIAC:   Normal rate,   Regular rhythm.    No edema    Vascular:   normal systolic impulse  no bruits    RESPIRATORY:   No wheezing  Bilateral BS     GASTROINTESTINAL:  Abdomen soft,   Non-tender,     NEUROLOGICAL:   Arousable   No motor deficits.    SKIN:   Skin normal color for race,   No evidence of rash.        LABS:                          14.0   7.52  )-----------( 248      ( 06 Jun 2025 07:29 )             40.6                                               06-06    140  |  106  |  13  ----------------------------<  138[H]  4.4   |  22  |  0.9    Ca    9.5      06 Jun 2025 07:29    TPro  6.8  /  Alb  4.3  /  TBili  0.4  /  DBili  x   /  AST  27  /  ALT  31  /  AlkPhos  50  06-06      PT/INR - ( 06 Jun 2025 07:29 )   PT: 10.50 sec;   INR: 0.89 ratio         PTT - ( 06 Jun 2025 07:29 )  PTT:32.1 sec                                       Urinalysis Basic - ( 06 Jun 2025 07:29 )    Color: x / Appearance: x / SG: x / pH: x  Gluc: 138 mg/dL / Ketone: x  / Bili: x / Urobili: x   Blood: x / Protein: x / Nitrite: x   Leuk Esterase: x / RBC: x / WBC x   Sq Epi: x / Non Sq Epi: x / Bacteria: x                                                LIVER FUNCTIONS - ( 06 Jun 2025 07:29 )  Alb: 4.3 g/dL / Pro: 6.8 g/dL / ALK PHOS: 50 U/L / ALT: 31 U/L / AST: 27 U/L / GGT: x                                                                                                                                       X-Rays reviewed                                                                                     ECHO      MEDICATIONS  (STANDING):    MEDICATIONS  (PRN):        
MEDICAL TOXICOLOGY CONSULT    HPI:    54-year-old male with a history of hyperlipidemia, diabetes mellitus, and depression, presenting after an intentional overdose at 6 AM. Patient reports ingestion of 22 tablets of clonazepam (0.5 mg, prescription bottle at bedside) but is uncertain about additional substances. Arrived responsive but lethargic, able to answer questions, pinpoint pupils, moving all 4 ex, skin moist. Vital signs are initially stable without bradypnea however pt is becoming progressively more lethargic in the ED. Home medications include sildenafil, fluoxetine, metformin, simvastatin, and melatonin. EKG shows sinus rhythm at 68 bpm, QRS duration 92 ms, and QTc interval 410 ms.    PAST MEDICAL & SURGICAL HISTORY:  Diabetes mellitus, type 2      High blood cholesterol      Paranoid schizophrenia      S/P knee surgery  1997 Left          MEDICATION HISTORY:  lactated ringers Bolus 1000 milliLiter(s) IV Bolus once      FAMILY HISTORY:      PHYSICAL EXAM  Vital Signs Last 24 Hrs  T(C): 36.4 (06 Jun 2025 08:10), Max: 36.5 (06 Jun 2025 07:11)  T(F): 97.6 (06 Jun 2025 08:10), Max: 97.7 (06 Jun 2025 07:11)  HR: 67 (06 Jun 2025 07:11) (67 - 67)  BP: 107/74 (06 Jun 2025 07:11) (107/74 - 107/74)  BP(mean): --  RR: 16 (06 Jun 2025 07:11) (16 - 16)  SpO2: 95% (06 Jun 2025 07:11) (95% - 95%)    Parameters below as of 06 Jun 2025 07:11  Patient On (Oxygen Delivery Method): room air        SIGNIFICANT LABORATORY STUDIES:                        14.0   7.52  )-----------( 248      ( 06 Jun 2025 07:29 )             40.6       06-06    140  |  106  |  13  ----------------------------<  138[H]  4.4   |  22  |  0.9    Ca    9.5      06 Jun 2025 07:29    TPro  6.8  /  Alb  4.3  /  TBili  0.4  /  DBili  x   /  AST  27  /  ALT  31  /  AlkPhos  50  06-06      PT/INR - ( 06 Jun 2025 07:29 )   PT: 10.50 sec;   INR: 0.89 ratio         PTT - ( 06 Jun 2025 07:29 )  PTT:32.1 sec    Urinalysis Basic - ( 06 Jun 2025 07:29 )    Color: x / Appearance: x / SG: x / pH: x  Gluc: 138 mg/dL / Ketone: x  / Bili: x / Urobili: x   Blood: x / Protein: x / Nitrite: x   Leuk Esterase: x / RBC: x / WBC x   Sq Epi: x / Non Sq Epi: x / Bacteria: x        Anion Gap: 12 06-06 @ 07:29  CK: -- 06-06 @ 07:29  Troponin:  --  06-06 @ 07:29  Pro-BNP:  --  06-06 @ 07:29  VBG:  --  06-06 @ 07:29  Carboxyhemoglobin %:  --  06-06 @ 07:29  Methemoglobin %:  --  06-06 @ 07:29  Osmolality Serum:  --  06-06 @ 07:29  Aspirin Level: <0.3[L]  06-06 @ 07:29  Acetaminophen Level:  <5.0[L]  06-06 @ 07:29  Ethanol Level:  --  06-06 @ 07:29  Digoxin Level:  --  06-06 @ 07:29  Phenytoin Level:  --  06-06 @ 07:29  Carbamazepine level:  --  06-06 @ 07:29  Lamotrigine level:  --  06-06 @ 07:29      RADIOLOGIC STUDIES  
03-Dec-2021 00:52

## 2025-06-06 NOTE — H&P ADULT - ASSESSMENT
Assessment:   Drug Overdose with Clonazepam, ? Intentional   Hx T2DM  Hx HLD  Hx Paranoid Schizophrenia  Hx Depression      PLAN:  CNS: avoid sedation, End Tidal C02 monitoring, Consult Psych, One on one sit. Urine and serum drug screen.     HEENT: Oral care    PULMONARY:  HOB @ 45 degrees.  Aspiration precautions, wean oxygen, CXR. No hypercapnia on VBG.     CARDIOVASCULAR: Avoid volume overload, MAP adequate, f/u EKG and troponin; Gentle IV hydration while NPO.     GI: GI prophylaxis. NPO for now, SLP eval     RENAL:  Follow up lytes. Correct as needed, f/u UO. May need rahman.     INFECTIOUS DISEASE: Follow up cultures, procalcitonin, RVP, Watch off Abx for now     HEMATOLOGICAL:  DVT prophylaxis. f/u US duplex LE     ENDOCRINE:  Follow up FS.  Insulin protocol if needed.    MUSCULOSKELETAL: Fall precautions    SDU

## 2025-06-06 NOTE — PATIENT PROFILE ADULT - FALL HARM RISK - HARM RISK INTERVENTIONS

## 2025-06-06 NOTE — ED ADULT TRIAGE NOTE - CHIEF COMPLAINT QUOTE
Pt admits to taking approximately 24 clonazepam (mixture of 1 mg and 0.5mg), with intention of hurting himself. Pt denies HI, states "people are watching me and taking pictures of me".

## 2025-06-06 NOTE — H&P ADULT - ATTENDING COMMENTS
55 yo M with a medical history of Type 2 Diabetes mellitus, high blood cholesterol, paranoid schizophrenia presented to the ED for evaluation after taking 24 clonazepam tablets between 1 mg and 0.5. States that people are watching him. Patient found by his wife and brought to ED. Pt awakens to answer questions reports he took the pills as an "irrational act", he falls back asleep mid conversation    drug - benzodiazepine  overdose / suicidal ideation     - end tidal co2 monitoring   - monitor in SDU overnight   - 1:1 sit   - I spoke with psychiatrist -  will be seen in am   - maintenance fluids   - check urine drug screen   - DVT prophylaxis

## 2025-06-07 LAB
A1C WITH ESTIMATED AVERAGE GLUCOSE RESULT: 6.6 % — HIGH (ref 4–5.6)
ALBUMIN SERPL ELPH-MCNC: 4.1 G/DL — SIGNIFICANT CHANGE UP (ref 3.5–5.2)
ALP SERPL-CCNC: 49 U/L — SIGNIFICANT CHANGE UP (ref 30–115)
ALT FLD-CCNC: 40 U/L — SIGNIFICANT CHANGE UP (ref 0–41)
ANION GAP SERPL CALC-SCNC: 13 MMOL/L — SIGNIFICANT CHANGE UP (ref 7–14)
AST SERPL-CCNC: 30 U/L — SIGNIFICANT CHANGE UP (ref 0–41)
BILIRUB SERPL-MCNC: 0.5 MG/DL — SIGNIFICANT CHANGE UP (ref 0.2–1.2)
BUN SERPL-MCNC: 15 MG/DL — SIGNIFICANT CHANGE UP (ref 10–20)
CALCIUM SERPL-MCNC: 9.1 MG/DL — SIGNIFICANT CHANGE UP (ref 8.4–10.5)
CHLORIDE SERPL-SCNC: 105 MMOL/L — SIGNIFICANT CHANGE UP (ref 98–110)
CO2 SERPL-SCNC: 25 MMOL/L — SIGNIFICANT CHANGE UP (ref 17–32)
CREAT SERPL-MCNC: 1.1 MG/DL — SIGNIFICANT CHANGE UP (ref 0.7–1.5)
DRUG SCREEN 1, URINE RESULT: SIGNIFICANT CHANGE UP
EGFR: 79 ML/MIN/1.73M2 — SIGNIFICANT CHANGE UP
EGFR: 79 ML/MIN/1.73M2 — SIGNIFICANT CHANGE UP
ESTIMATED AVERAGE GLUCOSE: 143 MG/DL — HIGH (ref 68–114)
GLUCOSE BLDC GLUCOMTR-MCNC: 113 MG/DL — HIGH (ref 70–99)
GLUCOSE BLDC GLUCOMTR-MCNC: 133 MG/DL — HIGH (ref 70–99)
GLUCOSE BLDC GLUCOMTR-MCNC: 137 MG/DL — HIGH (ref 70–99)
GLUCOSE BLDC GLUCOMTR-MCNC: 90 MG/DL — SIGNIFICANT CHANGE UP (ref 70–99)
GLUCOSE SERPL-MCNC: 95 MG/DL — SIGNIFICANT CHANGE UP (ref 70–99)
HCT VFR BLD CALC: 41.2 % — LOW (ref 42–52)
HGB BLD-MCNC: 14 G/DL — SIGNIFICANT CHANGE UP (ref 14–18)
MAGNESIUM SERPL-MCNC: 1.9 MG/DL — SIGNIFICANT CHANGE UP (ref 1.8–2.4)
MCHC RBC-ENTMCNC: 30.3 PG — SIGNIFICANT CHANGE UP (ref 27–31)
MCHC RBC-ENTMCNC: 34 G/DL — SIGNIFICANT CHANGE UP (ref 32–37)
MCV RBC AUTO: 89.2 FL — SIGNIFICANT CHANGE UP (ref 80–94)
NRBC BLD AUTO-RTO: 0 /100 WBCS — SIGNIFICANT CHANGE UP (ref 0–0)
PHOSPHATE SERPL-MCNC: 3.8 MG/DL — SIGNIFICANT CHANGE UP (ref 2.1–4.9)
PLATELET # BLD AUTO: 229 K/UL — SIGNIFICANT CHANGE UP (ref 130–400)
PMV BLD: 9.8 FL — SIGNIFICANT CHANGE UP (ref 7.4–10.4)
POTASSIUM SERPL-MCNC: 4.4 MMOL/L — SIGNIFICANT CHANGE UP (ref 3.5–5)
POTASSIUM SERPL-SCNC: 4.4 MMOL/L — SIGNIFICANT CHANGE UP (ref 3.5–5)
PROT SERPL-MCNC: 6.6 G/DL — SIGNIFICANT CHANGE UP (ref 6–8)
RBC # BLD: 4.62 M/UL — LOW (ref 4.7–6.1)
RBC # FLD: 12.6 % — SIGNIFICANT CHANGE UP (ref 11.5–14.5)
SODIUM SERPL-SCNC: 143 MMOL/L — SIGNIFICANT CHANGE UP (ref 135–146)
WBC # BLD: 7.68 K/UL — SIGNIFICANT CHANGE UP (ref 4.8–10.8)
WBC # FLD AUTO: 7.68 K/UL — SIGNIFICANT CHANGE UP (ref 4.8–10.8)

## 2025-06-07 PROCEDURE — 99233 SBSQ HOSP IP/OBS HIGH 50: CPT

## 2025-06-07 PROCEDURE — 99451 NTRPROF PH1/NTRNET/EHR 5/>: CPT

## 2025-06-07 PROCEDURE — 99232 SBSQ HOSP IP/OBS MODERATE 35: CPT | Mod: GC

## 2025-06-07 RX ADMIN — Medication 1 APPLICATION(S): at 16:57

## 2025-06-07 RX ADMIN — INSULIN GLARGINE-YFGN 30 UNIT(S): 100 INJECTION, SOLUTION SUBCUTANEOUS at 21:57

## 2025-06-07 NOTE — BH CONSULTATION LIAISON ASSESSMENT NOTE - OTHER PAST PSYCHIATRIC HISTORY (INCLUDE DETAILS REGARDING ONSET, COURSE OF ILLNESS, INPATIENT/OUTPATIENT TREATMENT)
Patient first hospitalized many years ago as an adult has been stable for years with outpatient psychiatrist and psychologist. Hospitalized here for exacerbation of schizoaffective disorder twice in 2018.  possible other hospitalizations.

## 2025-06-07 NOTE — BH CONSULTATION LIAISON ASSESSMENT NOTE - NSSUICPROTFACTOTHER_PSY_ALL_CORE
No protective factors were identified except demographically  since despite a relationship with family attempted suicide in the family home

## 2025-06-07 NOTE — BH CONSULTATION LIAISON ASSESSMENT NOTE - NSCOMMENTSUICRISKFACT_PSY_ALL_CORE
Patient has schizoaffective disorder with a recent  exacerbation and suicide attempt.   No protective factors were identified except demographically  since despite a relationship with family attempted suicide in the family home

## 2025-06-07 NOTE — BH CONSULTATION LIAISON ASSESSMENT NOTE - RISK ASSESSMENT
Patient is at high risk to kill himself  at this time because he made a serious attempt in response to psychosis which continues at this time.   In the future.   stopping medications and becoming irritable   would be considered warning signs.   Protective factors are unclear at this time since he was with family when he made the attempt.  There is no history of violence

## 2025-06-07 NOTE — BH CONSULTATION LIAISON ASSESSMENT NOTE - NSBHINDICATION_PSY_ALL_CORE
Patient attempted to kill himself in response to persistent delusion.   He remains with labile mood at times crying  suddenly .  Continues to report he wishes he was dead  at times.

## 2025-06-07 NOTE — BH CONSULTATION LIAISON ASSESSMENT NOTE - NSBHREFERDETAILS_PSY_A_CORE_FT
Patient with history of schizoaffective disorder attempted suicide after  long period of stability and was admitted  to ICU in an obtunded state. Patient with history of schizoaffective disorder attempted suicide after  stopping medications after long period of stability and was admitted  to ICU in an obtunded state.

## 2025-06-07 NOTE — SWALLOW BEDSIDE ASSESSMENT ADULT - SWALLOW EVAL: FUNCTIONAL LEVEL AT TIME OF EVAL
Awake and alert. Pt questioning where he is- SLP provided education. Pt denies any hx of dysphagia. 1:1 sit at bedside

## 2025-06-07 NOTE — SWALLOW BEDSIDE ASSESSMENT ADULT - SLP GENERAL OBSERVATIONS
Pt found laying in bed awake and alert. Pt questioning where he is- SLP provided education. Pt denies any hx of dysphagia. 1:1 sit at bedside

## 2025-06-07 NOTE — BH CONSULTATION LIAISON ASSESSMENT NOTE - ADDITIONAL DETAILS / COMMENTS
patient identifies himself at times as having schizoaffective disorder however he believes his current episode has nothing to do with the psychiatric disturbance and is  entirely

## 2025-06-07 NOTE — BH CONSULTATION LIAISON ASSESSMENT NOTE - NSBHCONSULTPSYCHPLAN_PSY_A_CORE FT
Patient remains delusional.   Refusing all medication at this time. should he become agitated  he can get Haldol 5 mg p.o. every 6 hours

## 2025-06-07 NOTE — BH CONSULTATION LIAISON ASSESSMENT NOTE - NSBHCONSULTRECOMMENDOTHER_PSY_A_CORE FT
Patient is refusing all medications at this time.   She did become agitated   Patient is refusing all medications at this time.    If patient becomes agitated suggest Haldol 5 mg p.o.  would avoid sedative-hypnotics   as possible.

## 2025-06-07 NOTE — BH CONSULTATION LIAISON ASSESSMENT NOTE - HPI (INCLUDE ILLNESS QUALITY, SEVERITY, DURATION, TIMING, CONTEXT, MODIFYING FACTORS, ASSOCIATED SIGNS AND SYMPTOMS)
Collateral obtained from patient's wife, Pilar at [phone]  States that patient has diagnosis of schizoaffective disorder with 2-3 prior inpatient hospitalizations. Allegedly stopped taking his medications about 8 months ago without telling anyone. She states he appeared stable until they went on vacation in March, and he appeared much more irritable and angry about the kind sof things that he would not be irritable about in the past until this past week when she noticed that he appeared more quiet and had stopped exercising.  States that he then started appearing to be more his normal self. She suspects that he wrote the letters in which he claimed that people were watching him and want to hit him yesterday. She notes that when she asked him this morning wehtehr the Klonopin overdose was a suicide attempt he states that yes, it was. In addition, she saw that the patient          Collateral obtained from patient's wife, Pilar at [phone]  States that patient has diagnosis of schizoaffective disorder with 2-3 prior inpatient hospitalizations. Allegedly stopped taking his medications about 8 months ago without telling anyone. She states he appeared stable until they went on vacation in March, and he appeared much more irritable and angry about the kind sof things that he would not be irritable about in the past until this past week when she noticed that he appeared more quiet and had stopped exercising.  States that he then started appearing to be more his normal self. She suspects that he wrote the letters in which he claimed that people were watching him and want to hit him yesterday. She notes that when she asked him this morning whether the Klonopin overdose was a suicide attempt he states that yes, it was. Notes that the nurse Yolanda heard him say this as well. In addition, she saw that the patient had taken 6 pills of his daughter's   Patient with history of schizoaffective disorder, stable times many years,  stopped taking medications ( Klonopin, Lamictal lamotrigine, fluoxetine)  approximately 8 months ago without wife's knowledge. She states he appeared stable until they went on vacation in March, and he appeared irritable and angry about the kind of things that  normally would not bother him.   During this past week when she noticed that he appeared more quiet and had stopped exercising.  States that he then started appearing to be more his normal self.  Yesterday however he  took overdose of pills in an attempt to kill himself.  Patient reports that  he stopped medications a month ago and was doing quite well.  Reports that during the past 3 weeks people inquires began driving very close to him, and then  making hand signs as though they were shooting him, cutting his throat or hanging him.   He reports that since it is a known fact that he has not ever hallucinated these things are real and, yesterday found them to be overwhelming and took an overdose of pills, was brought to the hospital and admitted to the ICU He remains convinced that people were signaling and intent to harm him.

## 2025-06-07 NOTE — BH CONSULTATION LIAISON ASSESSMENT NOTE - NSBHCHARTREVIEWINVESTIGATE_PSY_A_CORE FT
< from: 12 Lead ECG (06.06.25 @ 07:29) >      Ventricular Rate 68 BPM    Atrial Rate 68 BPM    P-R Interval 160 ms    QRS Duration 92 ms    Q-T Interval 386 ms    QTC Calculation(Bazett) 410 ms    P Axis 51 degrees    R Axis 24 degrees    T Axis 9 degrees    Diagnosis Line    Normal sinus rhythm  Nonspecific ST and T wave abnormality  Abnormal ECG    Confirmed by MD ALEIDA, MICHAELA ([id number]) on 6/6/2025 10:42:34 AM

## 2025-06-07 NOTE — BH CONSULTATION LIAISON ASSESSMENT NOTE - CURRENT MEDICATION
MEDICATIONS  (STANDING):  chlorhexidine 2% Cloths 1 Application(s) Topical daily  dextrose 5%. 1000 milliLiter(s) (100 mL/Hr) IV Continuous <Continuous>  dextrose 5%. 1000 milliLiter(s) (50 mL/Hr) IV Continuous <Continuous>  dextrose 50% Injectable 25 Gram(s) IV Push once  dextrose 50% Injectable 12.5 Gram(s) IV Push once  dextrose 50% Injectable 25 Gram(s) IV Push once  enoxaparin Injectable 40 milliGRAM(s) SubCutaneous every 24 hours  glucagon  Injectable 1 milliGRAM(s) IntraMuscular once  insulin glargine Injectable (LANTUS) 30 Unit(s) SubCutaneous at bedtime  insulin lispro (ADMELOG) corrective regimen sliding scale   SubCutaneous three times a day before meals  insulin lispro Injectable (ADMELOG) 10 Unit(s) SubCutaneous three times a day before meals    MEDICATIONS  (PRN):  dextrose Oral Gel 15 Gram(s) Oral once PRN Blood Glucose LESS THAN 70 milliGRAM(s)/deciliter

## 2025-06-07 NOTE — BH CONSULTATION LIAISON ASSESSMENT NOTE - OTHER
n/a Un-assessable but demonstrated at least 1 impulsive act by taking medications   Generally lucid and understandable.  However he jumps from 1 topic to another  speaks rapidly.  Goes from 1 topic to another   It is not entirely clear whether he is hallucinating or just delusional about people's activities with respect to threatening him with hand segments.   Patient identifies absolutely no psychiatric disturbance at this time

## 2025-06-07 NOTE — BH CONSULTATION LIAISON ASSESSMENT NOTE - DETAILS
Patient for past 3 weeks has seen people driving by him in cars making hand signals that they are going to hurt him.  He became increasingly upset about this  and feeling overwhelmed took unknown quantity of pills (Klonopin plus others) and attempt to end his life.  Follow-up note Unavailable  Patient for past 3 weeks has seen people driving by him in cars making hand signals that they are going to hurt him.  He became increasingly upset about this  and feeling overwhelmed took unknown quantity of pills (Klonopin plus others) and attempt to end his life.   Unavailable  .  Not in [facility] and no prior admissions here  bipolar disorder reported in mother's family

## 2025-06-07 NOTE — BH CONSULTATION LIAISON ASSESSMENT NOTE - SUMMARY
Patient with longstanding but stable schizoaffective disorder  stopped medications about 8 months ago and by his own report began thinking people were sending him messages that they were trying to hurt him over the past 3 weeks.   He became "overwhelmed" took Klonopin and other medications and attempt to kill himself.

## 2025-06-07 NOTE — BH CONSULTATION LIAISON ASSESSMENT NOTE - VIOLENCE RISK FACTORS:
Feeling of being under threat and being unable to control threat/Affective dysregulation/Firearm/weapon access

## 2025-06-07 NOTE — SWALLOW BEDSIDE ASSESSMENT ADULT - SLP PERTINENT HISTORY OF CURRENT PROBLEM
54y M with PMH of Type 2 Diabetes mellitus, high blood cholesterol, paranoid schizophrenia presented to the ED for evaluation after taking 24 clonazepam tablets between 1 mg and 0.5. States that people are watching him. Patient found by his wife at approximately between 6 and 630 who then drove the patient to the ER for further evaluation. Patient denies any symptoms at this time. Denies headache, visual changes, neck pain, chest pain, shortness of breath, abdominal pain, back pain, fevers or chills.

## 2025-06-07 NOTE — BH CONSULTATION LIAISON ASSESSMENT NOTE - NSBHCHARTREVIEWLAB_PSY_A_CORE FT
Complete Blood Count (06.07.25 @ 05:37)   Auto NRBC: 0 /100 WBCs  WBC Count: 7.68 K/uL  RBC Count: 4.62 M/uL  Hemoglobin: 14.0 g/dL  Hematocrit: 41.2 %  Mean Cell Volume: 89.2 fL  Mean Cell Hemoglobin: 30.3 pg  Mean Cell Hemoglobin Conc: 34.0 g/dL  Red Cell Distrib Width: 12.6 %  Platelet Count - Automated: 229 K/uL  MPV: 9.8 fL

## 2025-06-07 NOTE — BH CONSULTATION LIAISON ASSESSMENT NOTE - NSBHCHARTREVIEWVS_PSY_A_CORE FT
Vital Signs Last 24 Hrs  T(C): 35.6 (07 Jun 2025 07:06), Max: 35.8 (06 Jun 2025 13:35)  T(F): 96.1 (07 Jun 2025 07:06), Max: 96.5 (06 Jun 2025 13:35)  HR: 56 (07 Jun 2025 11:00) (52 - 66)  BP: 110/71 (07 Jun 2025 11:00) (90/59 - 127/80)  BP(mean): 85 (07 Jun 2025 11:00) (70 - 96)  RR: 13 (07 Jun 2025 11:00) (12 - 31)  SpO2: 96% (07 Jun 2025 11:00) (93% - 99%)    Parameters below as of 07 Jun 2025 11:00  Patient On (Oxygen Delivery Method): nasal cannula

## 2025-06-08 ENCOUNTER — INPATIENT (INPATIENT)
Facility: HOSPITAL | Age: 55
LOS: 9 days | Discharge: HOME | DRG: 918 | End: 2025-06-18
Attending: PSYCHIATRY & NEUROLOGY | Admitting: PSYCHIATRY & NEUROLOGY
Payer: MEDICARE

## 2025-06-08 VITALS
HEART RATE: 88 BPM | TEMPERATURE: 98 F | DIASTOLIC BLOOD PRESSURE: 84 MMHG | SYSTOLIC BLOOD PRESSURE: 130 MMHG | WEIGHT: 224.87 LBS | HEIGHT: 70 IN

## 2025-06-08 VITALS — TEMPERATURE: 97 F | DIASTOLIC BLOOD PRESSURE: 69 MMHG | SYSTOLIC BLOOD PRESSURE: 111 MMHG

## 2025-06-08 DIAGNOSIS — Z98.890 OTHER SPECIFIED POSTPROCEDURAL STATES: Chronic | ICD-10-CM

## 2025-06-08 DIAGNOSIS — R45.851 SUICIDAL IDEATIONS: ICD-10-CM

## 2025-06-08 LAB
GLUCOSE BLDC GLUCOMTR-MCNC: 104 MG/DL — HIGH (ref 70–99)
GLUCOSE BLDC GLUCOMTR-MCNC: 107 MG/DL — HIGH (ref 70–99)

## 2025-06-08 PROCEDURE — 82962 GLUCOSE BLOOD TEST: CPT

## 2025-06-08 PROCEDURE — 90792 PSYCH DIAG EVAL W/MED SRVCS: CPT | Mod: GC

## 2025-06-08 PROCEDURE — 99233 SBSQ HOSP IP/OBS HIGH 50: CPT

## 2025-06-08 RX ORDER — INSULIN LISPRO 100 U/ML
INJECTION, SOLUTION INTRAVENOUS; SUBCUTANEOUS
Refills: 0 | Status: DISCONTINUED | OUTPATIENT
Start: 2025-06-08 | End: 2025-06-10

## 2025-06-08 RX ORDER — MELATONIN 5 MG
3 TABLET ORAL AT BEDTIME
Refills: 0 | Status: DISCONTINUED | OUTPATIENT
Start: 2025-06-08 | End: 2025-06-18

## 2025-06-08 RX ORDER — HALOPERIDOL 10 MG/1
5 TABLET ORAL EVERY 6 HOURS
Refills: 0 | Status: DISCONTINUED | OUTPATIENT
Start: 2025-06-08 | End: 2025-06-08

## 2025-06-08 RX ORDER — SENNA 187 MG
2 TABLET ORAL AT BEDTIME
Refills: 0 | Status: DISCONTINUED | OUTPATIENT
Start: 2025-06-08 | End: 2025-06-18

## 2025-06-08 RX ORDER — LORAZEPAM 4 MG/ML
2 VIAL (ML) INJECTION EVERY 6 HOURS
Refills: 0 | Status: DISCONTINUED | OUTPATIENT
Start: 2025-06-08 | End: 2025-06-08

## 2025-06-08 RX ORDER — HYDROXYZINE HYDROCHLORIDE 25 MG/1
50 TABLET, FILM COATED ORAL EVERY 6 HOURS
Refills: 0 | Status: DISCONTINUED | OUTPATIENT
Start: 2025-06-08 | End: 2025-06-18

## 2025-06-08 RX ORDER — LURASIDONE HYDROCHLORIDE 120 MG/1
40 TABLET, FILM COATED ORAL DAILY
Refills: 0 | Status: DISCONTINUED | OUTPATIENT
Start: 2025-06-08 | End: 2025-06-13

## 2025-06-08 RX ORDER — DEXTROMETHORPHAN HBR, GUAIFENESIN 200 MG/10ML
100 LIQUID ORAL EVERY 6 HOURS
Refills: 0 | Status: DISCONTINUED | OUTPATIENT
Start: 2025-06-08 | End: 2025-06-18

## 2025-06-08 RX ORDER — ACETAMINOPHEN 500 MG/5ML
650 LIQUID (ML) ORAL EVERY 6 HOURS
Refills: 0 | Status: DISCONTINUED | OUTPATIENT
Start: 2025-06-08 | End: 2025-06-18

## 2025-06-08 RX ORDER — ATORVASTATIN CALCIUM 80 MG/1
20 TABLET, FILM COATED ORAL AT BEDTIME
Refills: 0 | Status: DISCONTINUED | OUTPATIENT
Start: 2025-06-08 | End: 2025-06-18

## 2025-06-08 RX ORDER — MAGNESIUM, ALUMINUM HYDROXIDE 200-200 MG
30 TABLET,CHEWABLE ORAL EVERY 6 HOURS
Refills: 0 | Status: DISCONTINUED | OUTPATIENT
Start: 2025-06-08 | End: 2025-06-18

## 2025-06-08 RX ORDER — ATORVASTATIN CALCIUM 80 MG/1
20 TABLET, FILM COATED ORAL AT BEDTIME
Refills: 0 | Status: DISCONTINUED | OUTPATIENT
Start: 2025-06-08 | End: 2025-06-08

## 2025-06-08 RX ORDER — DEXTROSE 50 % IN WATER 50 %
15 SYRINGE (ML) INTRAVENOUS ONCE
Refills: 0 | Status: DISCONTINUED | OUTPATIENT
Start: 2025-06-08 | End: 2025-06-18

## 2025-06-08 RX ORDER — METFORMIN HYDROCHLORIDE 850 MG/1
500 TABLET ORAL
Refills: 0 | Status: DISCONTINUED | OUTPATIENT
Start: 2025-06-08 | End: 2025-06-18

## 2025-06-08 RX ORDER — HALOPERIDOL 10 MG/1
5 TABLET ORAL EVERY 6 HOURS
Refills: 0 | Status: DISCONTINUED | OUTPATIENT
Start: 2025-06-08 | End: 2025-06-18

## 2025-06-08 RX ORDER — DIPHENHYDRAMINE HCL 12.5MG/5ML
50 ELIXIR ORAL EVERY 6 HOURS
Refills: 0 | Status: DISCONTINUED | OUTPATIENT
Start: 2025-06-08 | End: 2025-06-18

## 2025-06-08 RX ADMIN — INSULIN LISPRO 1: 100 INJECTION, SOLUTION INTRAVENOUS; SUBCUTANEOUS at 16:42

## 2025-06-08 RX ADMIN — METFORMIN HYDROCHLORIDE 500 MILLIGRAM(S): 850 TABLET ORAL at 19:50

## 2025-06-08 RX ADMIN — ATORVASTATIN CALCIUM 20 MILLIGRAM(S): 80 TABLET, FILM COATED ORAL at 19:50

## 2025-06-08 NOTE — BH INPATIENT PSYCHIATRY ASSESSMENT NOTE - NSBHCHARTREVIEWLAB_PSY_A_CORE FT
LABS:                          14.0   7.68  )-----------( 229      ( 07 Jun 2025 05:37 )             41.2     06-07    143  |  105  |  15  ----------------------------<  95  4.4   |  25  |  1.1    Ca    9.1      07 Jun 2025 05:37  Phos  3.8     06-07  Mg     1.9     06-07    TPro  6.6  /  Alb  4.1  /  TBili  0.5  /  DBili  x   /  AST  30  /  ALT  40  /  AlkPhos  49  06-07    LIVER FUNCTIONS - ( 07 Jun 2025 05:37 )  Alb: 4.1 g/dL / Pro: 6.6 g/dL / ALK PHOS: 49 U/L / ALT: 40 U/L / AST: 30 U/L / GGT: x             Urinalysis Basic - ( 07 Jun 2025 05:37 )    Color: x / Appearance: x / SG: x / pH: x  Gluc: 95 mg/dL / Ketone: x  / Bili: x / Urobili: x   Blood: x / Protein: x / Nitrite: x   Leuk Esterase: x / RBC: x / WBC x   Sq Epi: x / Non Sq Epi: x / Bacteria: x        06-07-25 @ 05:37  --  6.6  143  --

## 2025-06-08 NOTE — BH PATIENT PROFILE - HOME MEDICATIONS
simvastatin 40 mg oral tablet , 1 orally  metFORMIN 1000 mg oral tablet , 0.5 tab(s) orally 2 times a day

## 2025-06-08 NOTE — DISCHARGE NOTE PROVIDER - HOSPITAL COURSE
55 yo M with a medical history of Type 2 Diabetes mellitus, high blood cholesterol, paranoid schizophrenia presented to the ED for evaluation after taking 24 clonazepam tablets between 1 mg and 0.5. States that people are watching him. Patient found by his wife and brought to ED. Pt awakens to answer questions reports he took the pills as an "irrational act", he falls back asleep mid conversation  Per Psych- Has history of schizoaffective disorder, stable times many years,  stopped taking medications ( Klonopin, Lamictal lamotrigine, fluoxetine)  approximately 8 months ago without wife's knowledge. She states he appeared stable until they went on vacation in March, and he appeared irritable and angry about the kind of things that  normally would not bother him.   During this past week when she noticed that he appeared more quiet and had stopped exercising.  States that he then started appearing to be more his normal self.  Yesterday however he  took overdose of pills in an attempt to kill himself.  Patient reports that  he stopped medications a month ago and was doing quite well.  Reports that during the past 3 weeks people inquires began driving very close to him, and then  making hand signs as though they were shooting him, cutting his throat or hanging him.   He reports that since it is a known fact that he has not ever hallucinated these things are real and, yesterday found them to be overwhelming and took an overdose of pills, was brought to the hospital and admitted to the ICU He remains convinced that people were signaling and intent to harm him.    Was admitted to ICU for monitoring with 1:1 sit. Pt awakened and has been paranoid and delusional and agitated. Was seen by psych and planned for DC to inpatient psych.  Discussion of discharge plan of care, including discharge diagnoses, medication reconciliation, and follow-ups was conducted with the attending doctor on the date of discharge, and discharge was approved to [facility].  Can continue with home doses of metformin and statin. Pysch medications will be determined at IP.

## 2025-06-08 NOTE — BH INPATIENT PSYCHIATRY ASSESSMENT NOTE - HPI (INCLUDE ILLNESS QUALITY, SEVERITY, DURATION, TIMING, CONTEXT, MODIFYING FACTORS, ASSOCIATED SIGNS AND SYMPTOMS)
Patient is a 54 year old male, , domiciled with wife and 2 children, on disability, with PMH of type II diabetes and hypercholesteremia, past psychiatric history of schizoaffective disorder, not in outpatient care (previously saw Dr. Kulkarni up until 10/2024, where he was taking Lamictal, Fluoxetine, Latuda, Clonazepam), 2 prior IPP stays in 2018 for paranoia, no prior SA/NSSIB, substance use of alcohol (monthly), admitted for SA attempt by overdose on Klonopin. Admitted to IPP under 9.27 for SA attempt and one month of worsening psychosis (paranoid delusions, irritability).    Upon approach, patient appears irritable, with thought content notable for paranoid delusions. Patient explains that he only has a diagnosis of depression, denying any history of "hallucinations." He said he has been stable, so wanted to go off his psychotropics but his outpatient psychiatrist was hesitant. He self-discontinued lamital, latuda, fluoxetine, and clonazepam in 10/2024. He reports doing well until May 5th to May 26th, where he was driving his wife to work, and noticed 14-15 events of "different cars, different people, some blinking their lights at me, other making a hand motion for hanging/ slitting throat/taking pictures/gun." He is adamant that "someone is out to get [him," mentioning people in his past that may be the culprit. He says that this all got too much for him and decided to take an entire old bottle of clonazepam. When asked if he wanted to die, he says "yeah, maybe. or just to relax." he frames this as an impulsive act that was "stupid." He refuses to hear about medications, reiterating that the person who is doing this to him should be put in a psychiatric mckeon, not him.     Spoke to wife, Pilar, #[phone]. She explains that she did not know patient stopped seeing his psychiatrist or taking his medications. She says he has history of Schizoaffective Disorder, 2 prior hospitalizations in 2018 for paranoia, was seeing Dr. Kulkarni and stablized on above mention med regimen for many years. 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 She says patient is very irritable, but not physically aggressive. She says patient also admitted to her that this was a suicide attempt.    Patient's wife, Pilar, showed provider two notes that the patient wrote to her. One was a yellow post-it notes that writes, "show this letter to Dr. Meza, Dr. Kulkarni, + Dr. Platt, for there may be a time I mentioned something similar that I just don't remember." The main note says, "6/6/25. Pilar, From the three plus weeks I drove you to work, which was from Monday May 5 to Monday May 25, I have had so many harassing things done to me while waiting for your session to end. I would have cars intentionally come near me as I sat in [facility] waiting for you as if they are going to hit my car. During this time I had cars, multiple times, drive by my car and as they drove by pretending to take a picture of me, pointing their index finger at me as if they are going to fire a gun at me, making a hanging sign as if they are going to hang me, and a cut throat sign as if they are going to cut my neck. I don't hallucinate so this is not paranoia. I've also had car flashing their lights at me as I drive and they drive on the other side of the road. I don't know who is behind these harassing tactics, which is about 15 times in total but I am having problems with these threat as parsimony doesn't apply here. I am having problems and I am now consumed with fear for someone is sending me a message. Multiple cars with drivers I don't recognize. This is not paranoia as I don't hallucinate. I cant handle and you were correct in noticing a change in me these last few weeks. I love you."

## 2025-06-08 NOTE — PROGRESS NOTE ADULT - SUBJECTIVE AND OBJECTIVE BOX
Patient is a 54y old  Male who presents with a chief complaint of       HPI:  54y M with PMH of Type 2 Diabetes mellitus, high blood cholesterol, paranoid schizophrenia presented to the ED for evaluation after taking 24 clonazepam tablets between 1 mg and 0.5. States that people are watching him. Patient found by his wife at approximately between 6 and 630 who then drove the patient to the ER for further evaluation. Patient denies any symptoms at this time. Denies headache, visual changes, neck pain, chest pain, shortness of breath, abdominal pain, back pain, fevers or chills.      ED vitals:   T(F): 97.6 (06-06 @ 08:10), Max: 97.7 (06-06 @ 07:11)  HR: 59 (06-06 @ 13:16) (53 - 75)  BP: 127/80 (06-06 @ 13:16) (102/67 - 127/80)  RR: 18 (06-06 @ 13:16) (16 - 18)  SpO2: 99% (06-06 @ 13:16) (95% - 99%)     In the ED, patient evaluated by toxicology. Received 1 LR bolus and placed on constant observation. Patient admitted to SDU for overdose.    (06 Jun 2025 14:02)      PAST MEDICAL & SURGICAL HISTORY:  Diabetes mellitus, type 2      High blood cholesterol      Paranoid schizophrenia      S/P knee surgery  1997 Left          FAMILY HISTORY:        Pt evaluated on rounds.  I reviewed the radiology tests and hospital record.    I reviewed previous notes on this patient.    Interval Events: No overnight events.      REVIEW OF SYSTEMS:   see HPI      OBJECTIVE:  ICU Vital Signs Last 24 Hrs  T(C): 36.1 (07 Jun 2025 23:49), Max: 36.4 (07 Jun 2025 15:14)  T(F): 97 (07 Jun 2025 23:49), Max: 97.6 (07 Jun 2025 15:14)  HR: 69 (07 Jun 2025 19:30) (52 - 71)  BP: 128/66 (07 Jun 2025 19:07) (90/59 - 138/77)  BP(mean): 92 (07 Jun 2025 19:07) (70 - 102)  ABP: --  ABP(mean): --  RR: 15 (07 Jun 2025 23:49) (12 - 28)  SpO2: 95% (07 Jun 2025 23:49) (93% - 98%)    O2 Parameters below as of 07 Jun 2025 19:30  Patient On (Oxygen Delivery Method): nasal cannula  O2 Flow (L/min): 2            06-06 @ 07:01  -  06-07 @ 07:00  --------------------------------------------------------  IN: 0 mL / OUT: 550 mL / NET: -550 mL    06-07 @ 07:01  -  06-08 @ 05:39  --------------------------------------------------------  IN: 960 mL / OUT: 1050 mL / NET: -90 mL      CAPILLARY BLOOD GLUCOSE      POCT Blood Glucose.: 133 mg/dL (07 Jun 2025 21:38)        PHYSICAL EXAM:     · ENMT:   Airway patent,   Nasal mucosa clear.  Mouth with normal mucosa.   No thrush    · EYES:   Clear bilaterally,   pupils equal,   round and reactive to light.    · CARDIAC:   Normal rate,   regular rhythm.    Heart sounds S1, S2.   No murmurs, no rubs or gallops on auscultation  no edema        CAROTID:   normal systolic impulse  no bruits    · RESPIRATORY:   rales  normal chest expansion  no retractions or use of accessory muscles  percussion of chest demonstrates no hyperresonance or dullness    · GASTROINTESTINAL:  Abdomen soft,   non-tender,   + BS  liver/spleen not palpable    · SKIN:   Skin normal color for race,   warm, dry   No evidence of rash.    · HEME LYMPH:   no splenomegaly.  No cervical  lymphadenopathy.  no inguinal lymphadenopathy    HOSPITAL MEDICATIONS:  MEDICATIONS  (STANDING):  chlorhexidine 2% Cloths 1 Application(s) Topical daily  dextrose 5%. 1000 milliLiter(s) (100 mL/Hr) IV Continuous <Continuous>  dextrose 5%. 1000 milliLiter(s) (50 mL/Hr) IV Continuous <Continuous>  dextrose 50% Injectable 25 Gram(s) IV Push once  dextrose 50% Injectable 12.5 Gram(s) IV Push once  dextrose 50% Injectable 25 Gram(s) IV Push once  enoxaparin Injectable 40 milliGRAM(s) SubCutaneous every 24 hours  glucagon  Injectable 1 milliGRAM(s) IntraMuscular once  insulin glargine Injectable (LANTUS) 30 Unit(s) SubCutaneous at bedtime  insulin lispro (ADMELOG) corrective regimen sliding scale   SubCutaneous three times a day before meals  insulin lispro Injectable (ADMELOG) 10 Unit(s) SubCutaneous three times a day before meals    MEDICATIONS  (PRN):  dextrose Oral Gel 15 Gram(s) Oral once PRN Blood Glucose LESS THAN 70 milliGRAM(s)/deciliter    lactated ringers.: Solution, 1000 milliLiter(s) infuse at 75 mL/Hr  lactated ringers Bolus:   1000 milliLiter(s), IV Bolus, once, infuse over 60 Minute(s), Stop After 1 Doses      LABS:                        14.0   7.68  )-----------( 229      ( 07 Jun 2025 05:37 )             41.2     06-07    143  |  105  |  15  ----------------------------<  95  4.4   |  25  |  1.1    Ca    9.1      07 Jun 2025 05:37  Phos  3.8     06-07  Mg     1.9     06-07    TPro  6.6  /  Alb  4.1  /  TBili  0.5  /  DBili  x   /  AST  30  /  ALT  40  /  AlkPhos  49  06-07    PT/INR - ( 06 Jun 2025 07:29 )   PT: 10.50 sec;   INR: 0.89 ratio         PTT - ( 06 Jun 2025 07:29 )  PTT:32.1 sec  Urinalysis Basic - ( 07 Jun 2025 05:37 )    Color: x / Appearance: x / SG: x / pH: x  Gluc: 95 mg/dL / Ketone: x  / Bili: x / Urobili: x   Blood: x / Protein: x / Nitrite: x   Leuk Esterase: x / RBC: x / WBC x   Sq Epi: x / Non Sq Epi: x / Bacteria: x        Venous Blood Gas:  06-06 @ 10:41  7.35/49/60/27/90.3  VBG Lactate: 2.3  Venous Blood Gas:  06-06 @ 07:40  7.37/43/52/25/85.1  VBG Lactate: 1.9                      RADIOLOGY: Today I personally interpreted the latest CXR and other pertinent films.              
  Patient is a 54y old  Male who presents with a chief complaint of     overnight events: On RA. off pressor. no complaints        ROS: as in HPI; All other systems reviewed are negative        PHYSICAL EXAM  Vital Signs Last 24 Hrs  T(C): 35.6 (07 Jun 2025 07:06), Max: 35.8 (06 Jun 2025 13:35)  T(F): 96.1 (07 Jun 2025 07:06), Max: 96.5 (06 Jun 2025 13:35)  HR: 59 (07 Jun 2025 09:00) (52 - 66)  BP: 124/77 (07 Jun 2025 09:00) (90/59 - 127/80)  BP(mean): 96 (07 Jun 2025 09:00) (70 - 96)  RR: 14 (07 Jun 2025 09:00) (12 - 31)  SpO2: 98% (07 Jun 2025 09:00) (93% - 99%)    Parameters below as of 07 Jun 2025 09:00  Patient On (Oxygen Delivery Method): nasal cannula          CONSTITUTIONAL:   NAD    ENT:   Airway patent,     EYES:   Clear bilaterally,   pupils equal,   round and reactive to light.    CARDIAC:   Normal rate,   regular rhythm.    no edema    RESPIRATORY:   No wheezing   Normal chest expansion  Not tachypneic,    GASTROINTESTINAL:  Abdomen soft, non-tender,   No guarding,   Positive BS    MUSCULOSKELETAL:   Range of motion is not limited,    NEUROLOGICAL:   awake   paranoid.   No motor deficits.    SKIN:   Skin normal color for race,   No evidence of rash.                I&O's Detail    06 Jun 2025 07:01  -  07 Jun 2025 07:00  --------------------------------------------------------  IN:  Total IN: 0 mL    OUT:    Voided (mL): 550 mL  Total OUT: 550 mL    Total NET: -550 mL            LABS:                        14.0   7.68  )-----------( 229      ( 07 Jun 2025 05:37 )             41.2     07 Jun 2025 05:37    143    |  105    |  15     ----------------------------<  95     4.4     |  25     |  1.1      Ca    9.1        07 Jun 2025 05:37  Phos  3.8       07 Jun 2025 05:37  Mg     1.9       07 Jun 2025 05:37    TPro  6.6    /  Alb  4.1    /  TBili  0.5    /  DBili  x      /  AST  30     /  ALT  40     /  AlkPhos  49     07 Jun 2025 05:37  Amylase x     lipase x              CAPILLARY BLOOD GLUCOSE      POCT Blood Glucose.: 137 mg/dL (07 Jun 2025 11:10)    PT/INR - ( 06 Jun 2025 07:29 )   PT: 10.50 sec;   INR: 0.89 ratio         PTT - ( 06 Jun 2025 07:29 )  PTT:32.1 sec  Urinalysis Basic - ( 07 Jun 2025 05:37 )    Color: x / Appearance: x / SG: x / pH: x  Gluc: 95 mg/dL / Ketone: x  / Bili: x / Urobili: x   Blood: x / Protein: x / Nitrite: x   Leuk Esterase: x / RBC: x / WBC x   Sq Epi: x / Non Sq Epi: x / Bacteria: x      Culture    Lactate, Blood: 1.9 mmol/L (06-06-25 @ 07:29)      MEDICATIONS  (STANDING):  chlorhexidine 2% Cloths 1 Application(s) Topical daily  dextrose 5%. 1000 milliLiter(s) (100 mL/Hr) IV Continuous <Continuous>  dextrose 5%. 1000 milliLiter(s) (50 mL/Hr) IV Continuous <Continuous>  dextrose 50% Injectable 25 Gram(s) IV Push once  dextrose 50% Injectable 12.5 Gram(s) IV Push once  dextrose 50% Injectable 25 Gram(s) IV Push once  enoxaparin Injectable 40 milliGRAM(s) SubCutaneous every 24 hours  glucagon  Injectable 1 milliGRAM(s) IntraMuscular once  insulin glargine Injectable (LANTUS) 30 Unit(s) SubCutaneous at bedtime  insulin lispro (ADMELOG) corrective regimen sliding scale   SubCutaneous three times a day before meals  insulin lispro Injectable (ADMELOG) 10 Unit(s) SubCutaneous three times a day before meals    MEDICATIONS  (PRN):  dextrose Oral Gel 15 Gram(s) Oral once PRN Blood Glucose LESS THAN 70 milliGRAM(s)/deciliter              
Patient is a 54y old  Male who presents with a chief complaint of       HPI:  54y M with PMH of Type 2 Diabetes mellitus, high blood cholesterol, paranoid schizophrenia presented to the ED for evaluation after taking 24 clonazepam tablets between 1 mg and 0.5. States that people are watching him. Patient found by his wife at approximately between 6 and 630 who then drove the patient to the ER for further evaluation. Patient denies any symptoms at this time. Denies headache, visual changes, neck pain, chest pain, shortness of breath, abdominal pain, back pain, fevers or chills.       In the ED, patient evaluated by toxicology. Received 1 LR bolus and placed on constant observation. Patient admitted to SDU for overdose.    (06 Jun 2025 14:02)      PAST MEDICAL & SURGICAL HISTORY:  Diabetes mellitus, type 2      High blood cholesterol      Paranoid schizophrenia      S/P knee surgery  1997 Left          FAMILY HISTORY:        Pt evaluated on rounds.  I reviewed the radiology tests and hospital record.    I reviewed previous notes on this patient.    Interval Events: No overnight events.      REVIEW OF SYSTEMS:   see HPI      OBJECTIVE:  ICU Vital Signs Last 24 Hrs  T(C): 35.7 (07 Jun 2025 04:02), Max: 36.5 (06 Jun 2025 07:11)  T(F): 96.3 (07 Jun 2025 04:02), Max: 97.7 (06 Jun 2025 07:11)  HR: 52 (07 Jun 2025 04:02) (52 - 75)  BP: 103/62 (07 Jun 2025 04:02) (97/60 - 127/80)  BP(mean): 78 (07 Jun 2025 04:02) (74 - 95)  ABP: --  ABP(mean): --  RR: 14 (07 Jun 2025 04:02) (13 - 31)  SpO2: 95% (07 Jun 2025 04:02) (95% - 99%)    O2 Parameters below as of 06 Jun 2025 20:00  Patient On (Oxygen Delivery Method): room air              06-06 @ 07:01  -  06-07 @ 05:56  --------------------------------------------------------  IN: 0 mL / OUT: 550 mL / NET: -550 mL      CAPILLARY BLOOD GLUCOSE      POCT Blood Glucose.: 103 mg/dL (06 Jun 2025 21:20)        PHYSICAL EXAM:     · ENMT:   Airway patent,   Nasal mucosa clear.  Mouth with normal mucosa.   No thrush    · EYES:   Clear bilaterally,   pupils equal,   round and reactive to light.    · CARDIAC:   Normal rate,   regular rhythm.    Heart sounds S1, S2.   No murmurs, no rubs or gallops on auscultation  no edema        CAROTID:   normal systolic impulse  no bruits    · RESPIRATORY:   rales  normal chest expansion  no retractions or use of accessory muscles  percussion of chest demonstrates no hyperresonance or dullness    · GASTROINTESTINAL:  Abdomen soft,   non-tender,   + BS  liver/spleen not palpable    · SKIN:   Skin normal color for race,   warm, dry   No evidence of rash.    · HEME LYMPH:   no splenomegaly.  No cervical  lymphadenopathy.  no inguinal lymphadenopathy    HOSPITAL MEDICATIONS:  MEDICATIONS  (STANDING):  chlorhexidine 2% Cloths 1 Application(s) Topical daily  dextrose 5%. 1000 milliLiter(s) (100 mL/Hr) IV Continuous <Continuous>  dextrose 5%. 1000 milliLiter(s) (50 mL/Hr) IV Continuous <Continuous>  dextrose 50% Injectable 25 Gram(s) IV Push once  dextrose 50% Injectable 12.5 Gram(s) IV Push once  dextrose 50% Injectable 25 Gram(s) IV Push once  enoxaparin Injectable 40 milliGRAM(s) SubCutaneous every 24 hours  glucagon  Injectable 1 milliGRAM(s) IntraMuscular once  insulin glargine Injectable (LANTUS) 30 Unit(s) SubCutaneous at bedtime  insulin lispro (ADMELOG) corrective regimen sliding scale   SubCutaneous three times a day before meals  insulin lispro Injectable (ADMELOG) 10 Unit(s) SubCutaneous three times a day before meals    MEDICATIONS  (PRN):  dextrose Oral Gel 15 Gram(s) Oral once PRN Blood Glucose LESS THAN 70 milliGRAM(s)/deciliter    lactated ringers.: Solution, 1000 milliLiter(s) infuse at 75 mL/Hr  lactated ringers Bolus:   1000 milliLiter(s), IV Bolus, once, infuse over 60 Minute(s), Stop After 1 Doses      LABS:                        14.0   7.52  )-----------( 248      ( 06 Jun 2025 07:29 )             40.6     06-06    140  |  106  |  13  ----------------------------<  138[H]  4.4   |  22  |  0.9    Ca    9.5      06 Jun 2025 07:29    TPro  6.8  /  Alb  4.3  /  TBili  0.4  /  DBili  x   /  AST  27  /  ALT  31  /  AlkPhos  50  06-06    PT/INR - ( 06 Jun 2025 07:29 )   PT: 10.50 sec;   INR: 0.89 ratio         PTT - ( 06 Jun 2025 07:29 )  PTT:32.1 sec  Urinalysis Basic - ( 06 Jun 2025 07:29 )    Color: x / Appearance: x / SG: x / pH: x  Gluc: 138 mg/dL / Ketone: x  / Bili: x / Urobili: x   Blood: x / Protein: x / Nitrite: x   Leuk Esterase: x / RBC: x / WBC x   Sq Epi: x / Non Sq Epi: x / Bacteria: x        Venous Blood Gas:  06-06 @ 10:41  7.35/49/60/27/90.3  VBG Lactate: 2.3  Venous Blood Gas:  06-06 @ 07:40  7.37/43/52/25/85.1  VBG Lactate: 1.9                      RADIOLOGY: Today I personally interpreted the latest CXR and other pertinent films.              
 Patient seen and evaluated this am, he is agitated, cursing and paranoid stating multiple people are out to kill him,      T(F): 96.1 (06-08-25 @ 07:04), Max: 97.6 (06-07-25 @ 15:14)  HR: 68 (06-08-25 @ 11:10)  BP: 129/74 (06-08-25 @ 11:10)  RR: 20  SpO2: 94% (06-08-25 @ 11:10) (93% - 98%)    PHYSICAL EXAM:  GENERAL: NAD  HEAD:  Atraumatic, Normocephalic  EYES: EOMI, PERRLA, conjunctiva and sclera clear  NERVOUS SYSTEM:   no focal deficits   CHEST/LUNG: Clear to percussion bilaterally; No rales, rhonchi, wheezing, or rubs  HEART: Regular rate and rhythm; No murmurs, rubs, or gallops  ABDOMEN: Soft, Nontender, Nondistended; Bowel sounds present  EXTREMITIES:  2+ Peripheral Pulses, No clubbing, cyanosis, or edema    LABS  06-07    143  |  105  |  15  ----------------------------<  95  4.4   |  25  |  1.1    Ca    9.1      07 Jun 2025 05:37  Phos  3.8     06-07  Mg     1.9     06-07    TPro  6.6  /  Alb  4.1  /  TBili  0.5  /  DBili  x   /  AST  30  /  ALT  40  /  AlkPhos  49  06-07                          14.0   7.68  )-----------( 229      ( 07 Jun 2025 05:37 )             41.2     Benzodiazepine, Urine (06.06.25 @ 16:30)   Benzodiazepine, Urine: Positive      MEDICATIONS  (STANDING):  chlorhexidine 2% Cloths 1 Application(s) Topical daily  enoxaparin Injectable 40 milliGRAM(s) SubCutaneous every 24 hours  insulin glargine Injectable (LANTUS) 30 Unit(s) SubCutaneous at bedtime  insulin lispro (ADMELOG) corrective regimen sliding scale   SubCutaneous three times a day before meals  insulin lispro Injectable (ADMELOG) 10 Unit(s) SubCutaneous three times a day before meals    MEDICATIONS  (PRN):  dextrose Oral Gel 15 Gram(s) Oral once PRN Blood Glucose LESS THAN 70 milliGRAM(s)/deciliter  haloperidol     Tablet 5 milliGRAM(s) Oral every 6 hours PRN agitation

## 2025-06-08 NOTE — DISCHARGE NOTE PROVIDER - NSDCMRMEDTOKEN_GEN_ALL_CORE_FT
metFORMIN 1000 mg oral tablet: 0.5 tab(s) orally 2 times a day  simvastatin 40 mg oral tablet: 1 orally

## 2025-06-08 NOTE — BH INPATIENT PSYCHIATRY ASSESSMENT NOTE - NSBHATTESTCOMMENTATTENDFT_PSY_A_CORE
Interviewed the patient with the resident Dr. Mullen. Patient presents with florid persecutory delusions and no insight. Currently denies SI and describes his SA as an "impulsive mistake". Agree with the assessment and plan.

## 2025-06-08 NOTE — DISCHARGE NOTE PROVIDER - NSDCCPCAREPLAN_GEN_ALL_CORE_FT
PRINCIPAL DISCHARGE DIAGNOSIS  Diagnosis: Overdose  Assessment and Plan of Treatment: You presented to the hospital after an overdose on klonopin in a asuicide attempt. You are medically stable to be discharged to inpatient psych where an optimal psych medication regimen will be worked on.        SECONDARY DISCHARGE DIAGNOSES  Diagnosis: Suicide threat or attempt  Assessment and Plan of Treatment:

## 2025-06-08 NOTE — BH INPATIENT PSYCHIATRY ASSESSMENT NOTE - DETAILS
Mother - bipolar  Sister - NSSIB, anxiety  Sister - hallucinations requiring IPP Wife reports trauma in his childhood See HPI

## 2025-06-08 NOTE — BH PATIENT PROFILE - FUNCTIONAL ASSESSMENT - DAILY ACTIVITY 6.
See message below from pt. From: Brynn Zamora  To: Gerhardt Romie Elizabeth  Sent: 3/29/2021 10:20 AM CDT  Subject: Medication Question    I have stopped taking Claritin D and have been taking Claritin for the last week and a half. I did not double the Metoprolol yet. These are my blood pressures readings since stopping the Claritin D:    3/19 129/87  pulse 68  3/20 136/93 pulse 68  3/22 119/88 pulse 83  3/23 132/90 pulse 73  3/24 121/92 pulse 67  3/25 120/84 pulse 80  3/26 124/88 pulse 71  3/27 117/81 pulse 78  3/28 128/88 pulse 76  3/29 117/83 pulse 75      Do you want me to double the metoprolol still or leave it at 50mg? 4 = No assist / stand by assistance

## 2025-06-08 NOTE — DISCHARGE NOTE PROVIDER - CARE PROVIDER_API CALL
Vinicius Chaudhari  Internal Medicine  [address] [place] [address]  Phone: [phone]  Fax: [phone]  Follow Up Time: 2 weeks

## 2025-06-08 NOTE — BH INPATIENT PSYCHIATRY ASSESSMENT NOTE - CURRENT MEDICATION
MEDICATIONS  (STANDING):  atorvastatin 20 milliGRAM(s) Oral at bedtime  insulin lispro (ADMELOG) corrective regimen sliding scale   SubCutaneous three times a day before meals  lurasidone 40 milliGRAM(s) Oral daily  metFORMIN 500 milliGRAM(s) Oral two times a day    MEDICATIONS  (PRN):  acetaminophen     Tablet .. 650 milliGRAM(s) Oral every 6 hours PRN Temp greater or equal to 38C (100.4F), Mild Pain (1 - 3), Moderate Pain (4 - 6)  aluminum hydroxide/magnesium hydroxide/simethicone Suspension 30 milliLiter(s) Oral every 6 hours PRN Dyspepsia  dextrose Oral Gel 15 Gram(s) Oral once PRN Blood Glucose LESS THAN 70 milliGRAM(s)/deciliter  diphenhydrAMINE 50 milliGRAM(s) Oral every 6 hours PRN Extrapyramidal prophylaxis  guaiFENesin Oral Liquid (Sugar-Free) 100 milliGRAM(s) Oral every 6 hours PRN cough  haloperidol     Tablet 5 milliGRAM(s) Oral every 6 hours PRN agitation  hydrOXYzine hydrochloride 50 milliGRAM(s) Oral every 6 hours PRN anxiety  LORazepam     Tablet 2 milliGRAM(s) Oral every 6 hours PRN Combative behavior  melatonin. 3 milliGRAM(s) Oral at bedtime PRN Insomnia  senna 2 Tablet(s) Oral at bedtime PRN Constipation

## 2025-06-08 NOTE — PROGRESS NOTE ADULT - ASSESSMENT
IMPRESSION:    Drug Overdose with Clonezapam, Intentional ?  HO T2DM  HO HLD  Delusion  HO Paranoid Schizophrenia  HO Depression        PLAN:  Etco2 monitor  Consult Psych  Urine and serum drug screen.   CXR  Advance diet as tolerated  Off abx , Monitor Vital sign  DVT ppx  Fall precautions,   constant observation    DG floor if stable in PM  
IMPRESSION:    Drug Overdose with Clonezapam, Intentional ?  HO T2DM  HO HLD  HO Paranoid Schizophrenia  HO Depression      PLAN:    CNS: avoid sedation, End Tidal C02 monitoring,   Consult Psych, One on one sit.   Urine and serum drug screen.     HEENT: Oral care    PULMONARY:  HOB @ 45 degrees.  Aspiration precautions, wean oxygen,   CXR.     CARDIOVASCULAR: avoid volume overload, MAP adequate, f/u EKG and troponin; gental IV hydration while NPO.     GI: GI prophylaxis. NPO for now, SLP eval     RENAL:  Follow up lytes. Correct as needed, f/u UO.      INFECTIOUS DISEASE: Follow up cultures, procalcitonin, RVP, Watch off Abx for now     HEMATOLOGICAL:  DVT prophylaxis. f/u US duplex LE     ENDOCRINE:  Follow up FS.  Insulin protocol if needed.    MUSCULOSKELETAL: Fall precautions    SDU for now; possible DG             
55 yo M with a medical history of Type 2 Diabetes mellitus, high blood cholesterol, paranoid schizophrenia presented to the ED for evaluation after taking 24 clonazepam tablets between 1 mg and 0.5. States that people are watching him. Patient found by his wife and brought to ED. Pt awakens to answer questions reports he took the pills as an "irrational act", he falls back asleep mid conversation    drug - benzodiazepine  overdose / paranoid delusions /  suicidal ideation     - pt is now awake and alert (paranoid and agitated)   - medically stable for DC to IPP   - I discussed plan with pts wife at bedside who agrees with IPP   - I discussed plan with psychiatrist   - continue 1:1 sit   - 50 minutes total spent today on patient care   - DVT prophylaxis   
IMPRESSION:    Drug Overdose with Clonezapam, Intentional ?  HO T2DM  HO HLD  HO Paranoid Schizophrenia  HO Depression      PLAN:    CNS: avoid sedation,  F/U Psych high risk for suicide refusing meds at present  One on one sit.   PRN Haldol PO IV for agitation  Urine and serum drug screen.     HEENT: Oral care    PULMONARY:  HOB @ 45 degrees.  Aspiration precautions  continue O2 as necessary to maintain sats > 90%<94      CARDIOVASCULAR: avoid volume overload, MAP adequate, f/u EKG and troponin;   gentle IV hydration     GI: GI prophylaxis.   diet as tolerated    RENAL:  Follow up lytes. Correct as needed, f/u UO.      INFECTIOUS DISEASE: Follow up cultures, procalcitonin, RVP, Watch off Abx for now     HEMATOLOGICAL:  DVT prophylaxis. f/u US duplex LE     ENDOCRINE:  Follow up FS.  Insulin protocol if needed.    MUSCULOSKELETAL: Fall precautions    SDU

## 2025-06-08 NOTE — DISCHARGE NOTE NURSING/CASE MANAGEMENT/SOCIAL WORK - PATIENT PORTAL LINK FT
You can access the FollowMyHealth Patient Portal offered by [facility] by registering at the following website: [email or web address]. By joining [facility]’s FollowMyHealth portal, you will also be able to view your health information using other applications (apps) compatible with our system.

## 2025-06-08 NOTE — BH INPATIENT PSYCHIATRY ASSESSMENT NOTE - RISK ASSESSMENT
Warning signs: isolation, anxiety  Static risk factors: male gender,  race, history of psychiatric hospitalizations, history of psychotic disorder   Modifiable risk factors: psychosis, psychiatric illness, impulsivity, recent psychosocial stressors, substance use/intoxication, unemployment,  lack of connection to care  Protective factors: social support, family connectiveness, no past SA, no past NSSIB, domiciled status, future-orientation, lack of current suicidality or homicidally, lack of urges to self-harm or engage in NSSIB,   Access to lethal: denies  Level of risk: moderate

## 2025-06-08 NOTE — BH INPATIENT PSYCHIATRY ASSESSMENT NOTE - NSBHMETABOLIC_PSY_ALL_CORE_FT
BMI: BMI (kg/m2): 32.8 (06-06-25 @ 13:35)  HbA1c: A1C with Estimated Average Glucose Result: 6.6 % (06-07-25 @ 05:37)    Glucose: POCT Blood Glucose.: 107 mg/dL (06-08-25 @ 11:24)    BP: --Vital Signs Last 24 Hrs  T(C): 36.3 (06-08-25 @ 13:09), Max: 36.4 (06-07-25 @ 15:14)  T(F): 97.4 (06-08-25 @ 13:09), Max: 97.6 (06-07-25 @ 15:14)  HR: 71 (06-08-25 @ 12:56) (54 - 71)  BP: 111/69 (06-08-25 @ 13:09) (111/69 - 129/74)  BP(mean): 84 (06-08-25 @ 13:09) (84 - 97)  RR: 17 (06-08-25 @ 12:56) (12 - 26)  SpO2: 94% (06-08-25 @ 12:56) (93% - 95%)      Lipid Panel:  BMI: BMI (kg/m2): 32.3 (06-08-25 @ 13:42)  HbA1c: A1C with Estimated Average Glucose Result: 6.6 % (06-07-25 @ 05:37)    Glucose: POCT Blood Glucose.: 107 mg/dL (06-08-25 @ 11:24)    BP: 130/84 (06-08-25 @ 13:42) (130/84 - 130/84)Vital Signs Last 24 Hrs  T(C): 36.6 (06-08-25 @ 13:42), Max: 36.6 (06-08-25 @ 13:42)  T(F): 97.8 (06-08-25 @ 13:42), Max: 97.8 (06-08-25 @ 13:42)  HR: 88 (06-08-25 @ 13:42) (54 - 88)  BP: 130/84 (06-08-25 @ 13:42) (111/69 - 130/84)  BP(mean): 84 (06-08-25 @ 13:09) (84 - 95)  RR: 17 (06-08-25 @ 12:56) (12 - 26)  SpO2: 94% (06-08-25 @ 12:56) (93% - 95%)      Lipid Panel:

## 2025-06-08 NOTE — DISCHARGE NOTE NURSING/CASE MANAGEMENT/SOCIAL WORK - FINANCIAL ASSISTANCE
[facility] provides services at a reduced cost to those who are determined to be eligible through [facility]’s financial assistance program. Information regarding [facility]’s financial assistance program can be found by going to [email or web address] or by calling [phone].

## 2025-06-08 NOTE — BH INPATIENT PSYCHIATRY ASSESSMENT NOTE - NSBHASSESSSUMMFT_PSY_ALL_CORE
Patient is a 54 year old male, , domiciled with wife and 2 children, on disability, with PMH of type II diabetes and hypercholesteremia, past psychiatric history of schizoaffective disorder, not in outpatient care (previously saw Dr. Kulkarni up until 10/2024, where he was taking Lamictal, Fluoxetine, Latuda, Clonazepam), 2 prior IPP stays in 2018 for paranoia, no prior SA/NSSIB, substance use of alcohol (monthly), admitted for SA attempt by overdose on Klonopin. Admitted to IPP under 9.27 for SA attempt and one month of worsening psychosis (paranoid delusions, irritability).    Upon evaluation, patient appears irritable, with thought content notable for paranoid delusions. Patient is presenting with about one months duration of worsening psychosis, evident by paranoid delusions, suicide attempt by OD, poor sleep, increasing irritability, in the context of known schizoaffective disorder, treatment nonadherence (patient self-discontinued Lamictal, Fluoxetine, Latuda, Clonazepam in 10/2024 and stopped following with psychiatrist), and substance use (was intoxicated prior to SA). Patient would benefit from restarting Latuda, which he previously tolerated well, for his acute psychosis. However, patient has extremely poor insight his condition, leading him to refuse medication, so will likely need TOO order if he doesn't improve. Due to the severity of patient's SA and acute psychosis, patient requires continued IPP admission for safety, stabilization and medication management.     Recommendations:  #Schizoaffective Disorder  - Start Latuda 40mg PO daily with food for his psychosis  - Follow up TSH, Lipid - a1c already done    #Diabetes  - Resume home medication of Metformin 500mg BID    #Hypercholesteremia  - Patient takes at home, but non-formulary  - Start Atorvastatin 20mg PO QHS while admitted    #Other PRNs  - Atarax 50mg PO q6h PRN for anxiety  - Melatonin 3mg PO QHS PRN for insomnia  - Senna 2 tablets PO QHS PRN for constipation  - Maalox 30mL PO q6h PRN for dyspepsia  - Tylenol 650mg PO q6h PRN for temp/pain  - Guaifenesin 100mg PO q6h PRN for cough  - For acute agitation not amenable to verbal redirection, can give Haldol 5mg PO q6h PRN, Benadryl 50mg PO q6h PRN, and Ativan 2mg PO q6h PRN with escalation to IM if patient is a danger to self/others. If IM antipsychotic is administered, please obtain EKG to check the QTC; if the QTC>500 please hold Haldol, as it can further prolong the QTC.

## 2025-06-08 NOTE — BH INPATIENT PSYCHIATRY ASSESSMENT NOTE - NSBHCHARTREVIEWVS_PSY_A_CORE FT
Vital Signs Last 24 Hrs  T(C): 36.3 (06-08-25 @ 13:09), Max: 36.4 (06-07-25 @ 15:14)  T(F): 97.4 (06-08-25 @ 13:09), Max: 97.6 (06-07-25 @ 15:14)  HR: 71 (06-08-25 @ 12:56) (54 - 71)  BP: 111/69 (06-08-25 @ 13:09) (111/69 - 129/74)  BP(mean): 84 (06-08-25 @ 13:09) (84 - 97)  RR: 17 (06-08-25 @ 12:56) (12 - 26)  SpO2: 94% (06-08-25 @ 12:56) (93% - 95%)     Vital Signs Last 24 Hrs  T(C): 36.6 (06-08-25 @ 13:42), Max: 36.6 (06-08-25 @ 13:42)  T(F): 97.8 (06-08-25 @ 13:42), Max: 97.8 (06-08-25 @ 13:42)  HR: 88 (06-08-25 @ 13:42) (54 - 88)  BP: 130/84 (06-08-25 @ 13:42) (111/69 - 130/84)  BP(mean): 84 (06-08-25 @ 13:09) (84 - 95)  RR: 17 (06-08-25 @ 12:56) (12 - 26)  SpO2: 94% (06-08-25 @ 12:56) (93% - 95%)

## 2025-06-08 NOTE — BH INPATIENT PSYCHIATRY ASSESSMENT NOTE - NSBHCHARTREVIEWINVESTIGATE_PSY_A_CORE FT
< from: 12 Lead ECG (06.06.25 @ 07:29) >    Ventricular Rate 68 BPM    Atrial Rate 68 BPM    P-R Interval 160 ms    QRS Duration 92 ms    Q-T Interval 386 ms    QTC Calculation(Bazett) 410 ms    P Axis 51 degrees    R Axis 24 degrees    T Axis 9 degrees    Diagnosis Line    Normal sinus rhythm  Nonspecific ST and T wave abnormality  Abnormal ECG    Confirmed by MD ALEIDA, MICHAELA ([id number]) on 6/6/2025 10:42:34 AM    < end of copied text >

## 2025-06-09 PROCEDURE — 99232 SBSQ HOSP IP/OBS MODERATE 35: CPT

## 2025-06-09 RX ADMIN — ATORVASTATIN CALCIUM 20 MILLIGRAM(S): 80 TABLET, FILM COATED ORAL at 20:03

## 2025-06-09 RX ADMIN — METFORMIN HYDROCHLORIDE 500 MILLIGRAM(S): 850 TABLET ORAL at 20:03

## 2025-06-09 RX ADMIN — LURASIDONE HYDROCHLORIDE 40 MILLIGRAM(S): 120 TABLET, FILM COATED ORAL at 08:35

## 2025-06-09 RX ADMIN — METFORMIN HYDROCHLORIDE 500 MILLIGRAM(S): 850 TABLET ORAL at 08:35

## 2025-06-09 NOTE — CONSULT NOTE ADULT - ASSESSMENT
Impression / Recommendations  54 year old male, , domiciled with wife and 2 children, on disability, with PMH of type II diabetes and hypercholesteremia, past psychiatric history of schizoaffective disorder, not in outpatient care (previously saw Dr. Kulkarni up until 10/2024, where he was taking Lamictal, Fluoxetine, Latuda, Clonazepam), 2 prior IPP stays in 2018 for paranoia, no prior SA/NSSIB, substance use of alcohol (monthly), admitted for SA attempt by overdose on Klonopin.    #Diabetes mellitus  Metformin 500mg twice daily  Hemoglobin A1C is 6.6.  His diabetes is well controlled with no hypoglycemic events.  Recommend discontinuing insulin, continue metformin.  Target blood sugar for inpatient monitoring is 140 to 180, patient has not been greater than 180 on his blood sugar checks.  May also discontinue POCT glucose checks or, if still concerned about blood glucose levels, consider decreasing POCT glucose checks to twice a day in the morning and night.    #Hyperlipidemia  Agree with checking lipid panel  Atorvastatin 20mg QHS    #Schizoaffective disorder  Defer to psychiatry as primary
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
## 2025-06-09 NOTE — BH INPATIENT PSYCHIATRY PROGRESS NOTE - PRN MEDS
MEDICATIONS  (PRN):  acetaminophen     Tablet .. 650 milliGRAM(s) Oral every 6 hours PRN Temp greater or equal to 38C (100.4F), Mild Pain (1 - 3), Moderate Pain (4 - 6)  aluminum hydroxide/magnesium hydroxide/simethicone Suspension 30 milliLiter(s) Oral every 6 hours PRN Dyspepsia  dextrose Oral Gel 15 Gram(s) Oral once PRN Blood Glucose LESS THAN 70 milliGRAM(s)/deciliter  diphenhydrAMINE 50 milliGRAM(s) Oral every 6 hours PRN Extrapyramidal prophylaxis  guaiFENesin Oral Liquid (Sugar-Free) 100 milliGRAM(s) Oral every 6 hours PRN cough  haloperidol     Tablet 5 milliGRAM(s) Oral every 6 hours PRN agitation  hydrOXYzine hydrochloride 50 milliGRAM(s) Oral every 6 hours PRN anxiety  LORazepam     Tablet 2 milliGRAM(s) Oral every 6 hours PRN Combative behavior  melatonin. 3 milliGRAM(s) Oral at bedtime PRN Insomnia  senna 2 Tablet(s) Oral at bedtime PRN Constipation

## 2025-06-09 NOTE — BH INPATIENT PSYCHIATRY PROGRESS NOTE - NSBHASSESSSUMMFT_PSY_ALL_CORE
Patient is a 54 year old male, , domiciled with wife and 2 children, on disability, with PMH of type II diabetes and hypercholesteremia, past psychiatric history of schizoaffective disorder, not in outpatient care (previously saw Dr. Kulkarni up until 10/2024, where he was taking Lamictal, Fluoxetine, Latuda, Clonazepam), 2 prior IPP stays in 2018 for paranoia, no prior SA/NSSIB, substance use of alcohol (monthly), admitted for SA attempt by overdose on Klonopin. Admitted to IPP under 9.27 for SA attempt and one month of worsening psychosis (paranoid delusions, irritability).    Upon evaluation, patient appears irritable, with thought content notable for paranoid delusions. Patient is presenting with about one months duration of worsening psychosis, evident by paranoid delusions, suicide attempt by OD, poor sleep, increasing irritability, in the context of known schizoaffective disorder, treatment nonadherence (patient self-discontinued Lamictal, Fluoxetine, Latuda, Clonazepam in 10/2024 and stopped following with psychiatrist), and substance use (was intoxicated prior to SA). Patient would benefit from restarting Latuda, which he previously tolerated well, for his acute psychosis. However, patient has extremely poor insight his condition, leading him to refuse medication, so will likely need TOO order if he doesn't improve. Due to the severity of patient's SA and acute psychosis, patient requires continued IPP admission for safety, stabilization and medication management.     6/9: Continues to express paranoid delusions with lack of insight into illness. Has taken Latuda so far during hospitalization but states he does not want to take it. May need TOO order as above. Will call wife (Pilar Bentley).    Recommendations:  #Schizoaffective Disorder  - Start Latuda 40mg PO daily with food for his psychosis  - Follow up TSH, Lipid - a1c already done    #Diabetes  - Resume home medication of Metformin 500mg BID    #Hypercholesteremia  - Patient takes at home, but non-formulary  - Start Atorvastatin 20mg PO QHS while admitted    #Other PRNs  - Atarax 50mg PO q6h PRN for anxiety  - Melatonin 3mg PO QHS PRN for insomnia  - Senna 2 tablets PO QHS PRN for constipation  - Maalox 30mL PO q6h PRN for dyspepsia  - Tylenol 650mg PO q6h PRN for temp/pain  - Guaifenesin 100mg PO q6h PRN for cough  - For acute agitation not amenable to verbal redirection, can give Haldol 5mg PO q6h PRN, Benadryl 50mg PO q6h PRN, and Ativan 2mg PO q6h PRN with escalation to IM if patient is a danger to self/others. If IM antipsychotic is administered, please obtain EKG to check the QTC; if the QTC>500 please hold Haldol, as it can further prolong the QTC.   Patient is a 54 year old male, , domiciled with wife and 2 children, on disability, with PMH of type II diabetes and hypercholesteremia, past psychiatric history of schizoaffective disorder, not in outpatient care (previously saw Dr. Kulkarni up until 10/2024, where he was taking Lamictal, Fluoxetine, Latuda, Clonazepam), 2 prior IPP stays in 2018 for paranoia, no prior SA/NSSIB, substance use of alcohol (monthly), admitted for SA attempt by overdose on Klonopin. Admitted to IPP under 9.27 for SA attempt and one month of worsening psychosis (paranoid delusions, irritability).    Upon evaluation, patient appears irritable, with thought content notable for paranoid delusions. Patient is presenting with about one months duration of worsening psychosis, evident by paranoid delusions, suicide attempt by OD, poor sleep, increasing irritability, in the context of known schizoaffective disorder, treatment nonadherence (patient self-discontinued Lamictal, Fluoxetine, Latuda, Clonazepam in 10/2024 and stopped following with psychiatrist), and substance use (was intoxicated prior to SA). Patient would benefit from restarting Latuda, which he previously tolerated well, for his acute psychosis. However, patient has extremely poor insight his condition, leading him to refuse medication, so will likely need TOO order if he doesn't improve. Due to the severity of patient's SA and acute psychosis, patient requires continued IPP admission for safety, stabilization and medication management.     6/9: Continues to express paranoid delusions with lack of insight into illness. Has taken Latuda intermittently so far during hospitalization but states he does not want to take it. Will call wife (Pilar Bentley) for collateral today.     Recommendations:  #Schizoaffective Disorder  - Continue Latuda 40mg PO daily with food for his psychosis  - Follow up TSH, Lipid - a1c already done    #Diabetes  - Resume home medication of Metformin 500mg BID    #Hypercholesteremia  - Patient takes at home, but non-formulary  - Start Atorvastatin 20mg PO QHS while admitted    #Other PRNs  - Atarax 50mg PO q6h PRN for anxiety  - Melatonin 3mg PO QHS PRN for insomnia  - Senna 2 tablets PO QHS PRN for constipation  - Maalox 30mL PO q6h PRN for dyspepsia  - Tylenol 650mg PO q6h PRN for temp/pain  - Guaifenesin 100mg PO q6h PRN for cough  - For acute agitation not amenable to verbal redirection, can give Haldol 5mg PO q6h PRN, Benadryl 50mg PO q6h PRN, and Ativan 2mg PO q6h PRN with escalation to IM if patient is a danger to self/others. If IM antipsychotic is administered, please obtain EKG to check the QTC; if the QTC>500 please hold Haldol, as it can further prolong the QTC.

## 2025-06-09 NOTE — BH INPATIENT PSYCHIATRY PROGRESS NOTE - NSBHMSETHTCONTENT_PSY_A_CORE
Delusions/Preoccupations Perseverative on paranoid ideations, not open to reality testing/Delusions/Preoccupations

## 2025-06-09 NOTE — BH INPATIENT PSYCHIATRY PROGRESS NOTE - NSBHMETABOLIC_PSY_ALL_CORE_FT
BMI: BMI (kg/m2): 32.3 (06-08-25 @ 13:42)  HbA1c: A1C with Estimated Average Glucose Result: 6.6 % (06-07-25 @ 05:37)    Glucose: POCT Blood Glucose.: 107 mg/dL (06-08-25 @ 11:24)    BP: 130/84 (06-08-25 @ 13:42) (130/84 - 130/84)Vital Signs Last 24 Hrs  T(C): 36.6 (06-08-25 @ 13:42), Max: 36.6 (06-08-25 @ 13:42)  T(F): 97.8 (06-08-25 @ 13:42), Max: 97.8 (06-08-25 @ 13:42)  HR: 88 (06-08-25 @ 13:42) (54 - 88)  BP: 130/84 (06-08-25 @ 13:42) (111/69 - 130/84)  BP(mean): 84 (06-08-25 @ 13:09) (84 - 95)  RR: 17 (06-08-25 @ 12:56) (12 - 26)  SpO2: 94% (06-08-25 @ 12:56) (93% - 95%)      Lipid Panel:  BMI: BMI (kg/m2): 32.3 (06-08-25 @ 13:42)  HbA1c: A1C with Estimated Average Glucose Result: 6.6 % (06-07-25 @ 05:37)    Glucose: POCT Blood Glucose.: 107 mg/dL (06-08-25 @ 11:24)    BP: 118/62 (06-09-25 @ 15:51) (95/62 - 130/84)Vital Signs Last 24 Hrs  T(C): 36.8 (06-09-25 @ 15:51), Max: 36.8 (06-09-25 @ 15:51)  T(F): 98.3 (06-09-25 @ 15:51), Max: 98.3 (06-09-25 @ 15:51)  HR: 80 (06-09-25 @ 15:51) (80 - 80)  BP: 118/62 (06-09-25 @ 15:51) (118/62 - 118/62)  BP(mean): --  RR: --  SpO2: --      Lipid Panel:

## 2025-06-09 NOTE — BH INPATIENT PSYCHIATRY PROGRESS NOTE - CURRENT MEDICATION
MEDICATIONS  (STANDING):  atorvastatin 20 milliGRAM(s) Oral at bedtime  insulin lispro (ADMELOG) corrective regimen sliding scale   SubCutaneous three times a day before meals  lurasidone 40 milliGRAM(s) Oral daily  metFORMIN 500 milliGRAM(s) Oral two times a day    MEDICATIONS  (PRN):  acetaminophen     Tablet .. 650 milliGRAM(s) Oral every 6 hours PRN Temp greater or equal to 38C (100.4F), Mild Pain (1 - 3), Moderate Pain (4 - 6)  aluminum hydroxide/magnesium hydroxide/simethicone Suspension 30 milliLiter(s) Oral every 6 hours PRN Dyspepsia  dextrose Oral Gel 15 Gram(s) Oral once PRN Blood Glucose LESS THAN 70 milliGRAM(s)/deciliter  diphenhydrAMINE 50 milliGRAM(s) Oral every 6 hours PRN Extrapyramidal prophylaxis  guaiFENesin Oral Liquid (Sugar-Free) 100 milliGRAM(s) Oral every 6 hours PRN cough  haloperidol     Tablet 5 milliGRAM(s) Oral every 6 hours PRN agitation  hydrOXYzine hydrochloride 50 milliGRAM(s) Oral every 6 hours PRN anxiety  LORazepam     Tablet 2 milliGRAM(s) Oral every 6 hours PRN Combative behavior  melatonin. 3 milliGRAM(s) Oral at bedtime PRN Insomnia  senna 2 Tablet(s) Oral at bedtime PRN Constipation   MEDICATIONS  (STANDING):  atorvastatin 20 milliGRAM(s) Oral at bedtime  lurasidone 40 milliGRAM(s) Oral daily  metFORMIN 500 milliGRAM(s) Oral two times a day    MEDICATIONS  (PRN):  acetaminophen     Tablet .. 650 milliGRAM(s) Oral every 6 hours PRN Temp greater or equal to 38C (100.4F), Mild Pain (1 - 3), Moderate Pain (4 - 6)  aluminum hydroxide/magnesium hydroxide/simethicone Suspension 30 milliLiter(s) Oral every 6 hours PRN Dyspepsia  dextrose Oral Gel 15 Gram(s) Oral once PRN Blood Glucose LESS THAN 70 milliGRAM(s)/deciliter  diphenhydrAMINE 50 milliGRAM(s) Oral every 6 hours PRN Extrapyramidal prophylaxis  guaiFENesin Oral Liquid (Sugar-Free) 100 milliGRAM(s) Oral every 6 hours PRN cough  haloperidol     Tablet 5 milliGRAM(s) Oral every 6 hours PRN agitation  hydrOXYzine hydrochloride 50 milliGRAM(s) Oral every 6 hours PRN anxiety  LORazepam     Tablet 2 milliGRAM(s) Oral every 6 hours PRN Combative behavior  melatonin. 3 milliGRAM(s) Oral at bedtime PRN Insomnia  senna 2 Tablet(s) Oral at bedtime PRN Constipation

## 2025-06-09 NOTE — BH INPATIENT PSYCHIATRY PROGRESS NOTE - NSBHCHARTREVIEWVS_PSY_A_CORE FT
Vital Signs Last 24 Hrs  T(C): 36.6 (06-08-25 @ 13:42), Max: 36.6 (06-08-25 @ 13:42)  T(F): 97.8 (06-08-25 @ 13:42), Max: 97.8 (06-08-25 @ 13:42)  HR: 88 (06-08-25 @ 13:42) (54 - 88)  BP: 130/84 (06-08-25 @ 13:42) (111/69 - 130/84)  BP(mean): 84 (06-08-25 @ 13:09) (84 - 95)  RR: 17 (06-08-25 @ 12:56) (12 - 26)  SpO2: 94% (06-08-25 @ 12:56) (93% - 95%)     Vital Signs Last 24 Hrs  T(C): 36.8 (06-09-25 @ 15:51), Max: 36.8 (06-09-25 @ 15:51)  T(F): 98.3 (06-09-25 @ 15:51), Max: 98.3 (06-09-25 @ 15:51)  HR: 80 (06-09-25 @ 15:51) (80 - 80)  BP: 118/62 (06-09-25 @ 15:51) (118/62 - 118/62)  BP(mean): --  RR: --  SpO2: --
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
## 2025-06-10 PROCEDURE — 99231 SBSQ HOSP IP/OBS SF/LOW 25: CPT

## 2025-06-10 RX ADMIN — METFORMIN HYDROCHLORIDE 500 MILLIGRAM(S): 850 TABLET ORAL at 08:07

## 2025-06-10 RX ADMIN — METFORMIN HYDROCHLORIDE 500 MILLIGRAM(S): 850 TABLET ORAL at 19:56

## 2025-06-10 RX ADMIN — LURASIDONE HYDROCHLORIDE 40 MILLIGRAM(S): 120 TABLET, FILM COATED ORAL at 08:07

## 2025-06-10 RX ADMIN — ATORVASTATIN CALCIUM 20 MILLIGRAM(S): 80 TABLET, FILM COATED ORAL at 19:56

## 2025-06-10 NOTE — BH INPATIENT PSYCHIATRY PROGRESS NOTE - CURRENT MEDICATION
MEDICATIONS  (STANDING):  atorvastatin 20 milliGRAM(s) Oral at bedtime  lurasidone 40 milliGRAM(s) Oral daily  metFORMIN 500 milliGRAM(s) Oral two times a day    MEDICATIONS  (PRN):  acetaminophen     Tablet .. 650 milliGRAM(s) Oral every 6 hours PRN Temp greater or equal to 38C (100.4F), Mild Pain (1 - 3), Moderate Pain (4 - 6)  aluminum hydroxide/magnesium hydroxide/simethicone Suspension 30 milliLiter(s) Oral every 6 hours PRN Dyspepsia  dextrose Oral Gel 15 Gram(s) Oral once PRN Blood Glucose LESS THAN 70 milliGRAM(s)/deciliter  diphenhydrAMINE 50 milliGRAM(s) Oral every 6 hours PRN Extrapyramidal prophylaxis  guaiFENesin Oral Liquid (Sugar-Free) 100 milliGRAM(s) Oral every 6 hours PRN cough  haloperidol     Tablet 5 milliGRAM(s) Oral every 6 hours PRN agitation  hydrOXYzine hydrochloride 50 milliGRAM(s) Oral every 6 hours PRN anxiety  LORazepam     Tablet 2 milliGRAM(s) Oral every 6 hours PRN Combative behavior  melatonin. 3 milliGRAM(s) Oral at bedtime PRN Insomnia  senna 2 Tablet(s) Oral at bedtime PRN Constipation

## 2025-06-10 NOTE — BH INPATIENT PSYCHIATRY PROGRESS NOTE - NSBHASSESSSUMMFT_PSY_ALL_CORE
Patient is a 54 year old male, , domiciled with wife and 2 children, on disability, with PMH of type II diabetes and hypercholesteremia, past psychiatric history of schizoaffective disorder, not in outpatient care (previously saw Dr. Kulkarni up until 10/2024, where he was taking Lamictal, Fluoxetine, Latuda, Clonazepam), 2 prior IPP stays in 2018 for paranoia, no prior SA/NSSIB, substance use of alcohol (monthly), admitted for SA attempt by overdose on Klonopin. Admitted to IPP under 9.27 for SA attempt and one month of worsening psychosis (paranoid delusions, irritability).    Upon evaluation, patient appears irritable, with thought content notable for paranoid delusions. Patient is presenting with about one months duration of worsening psychosis, evident by paranoid delusions, suicide attempt by OD, poor sleep, increasing irritability, in the context of known schizoaffective disorder, treatment nonadherence (patient self-discontinued Lamictal, Fluoxetine, Latuda, Clonazepam in 10/2024 and stopped following with psychiatrist), and substance use (was intoxicated prior to SA). Patient would benefit from restarting Latuda, which he previously tolerated well, for his acute psychosis. However, patient has extremely poor insight his condition, leading him to refuse medication, so will likely need TOO order if he doesn't improve. Due to the severity of patient's SA and acute psychosis, patient requires continued IPP admission for safety, stabilization and medication management.     6/10: Patient already shows improvement - holding less firmly onto paranoid delusions. Has agreed to continue taking medications after speaking with wife yesterday. Denies SI/HI/AVH. Continue current regimen - patient is improving.      Recommendations:  #Schizoaffective Disorder  - Continue Latuda 40mg PO daily with food for his psychosis  - Follow up TSH, Lipid - a1c already done    #Diabetes  - Resume home medication of Metformin 500mg BID    #Hypercholesteremia  - Patient takes at home, but non-formulary  - Start Atorvastatin 20mg PO QHS while admitted    #Other PRNs  - Atarax 50mg PO q6h PRN for anxiety  - Melatonin 3mg PO QHS PRN for insomnia  - Senna 2 tablets PO QHS PRN for constipation  - Maalox 30mL PO q6h PRN for dyspepsia  - Tylenol 650mg PO q6h PRN for temp/pain  - Guaifenesin 100mg PO q6h PRN for cough  - For acute agitation not amenable to verbal redirection, can give Haldol 5mg PO q6h PRN, Benadryl 50mg PO q6h PRN, and Ativan 2mg PO q6h PRN with escalation to IM if patient is a danger to self/others. If IM antipsychotic is administered, please obtain EKG to check the QTC; if the QTC>500 please hold Haldol, as it can further prolong the QTC.

## 2025-06-10 NOTE — BH INPATIENT PSYCHIATRY PROGRESS NOTE - NSBHCHARTREVIEWVS_PSY_A_CORE FT
Vital Signs Last 24 Hrs  T(C): 36.8 (06-09-25 @ 15:51), Max: 36.8 (06-09-25 @ 15:51)  T(F): 98.3 (06-09-25 @ 15:51), Max: 98.3 (06-09-25 @ 15:51)  HR: 80 (06-09-25 @ 15:51) (80 - 80)  BP: 118/62 (06-09-25 @ 15:51) (118/62 - 118/62)  BP(mean): --  RR: --  SpO2: --

## 2025-06-10 NOTE — BH INPATIENT PSYCHIATRY PROGRESS NOTE - NSBHMETABOLIC_PSY_ALL_CORE_FT
BMI: BMI (kg/m2): 32.3 (06-08-25 @ 13:42)  HbA1c: A1C with Estimated Average Glucose Result: 6.6 % (06-07-25 @ 05:37)    Glucose: POCT Blood Glucose.: 107 mg/dL (06-08-25 @ 11:24)    BP: 118/62 (06-09-25 @ 15:51) (95/62 - 130/84)Vital Signs Last 24 Hrs  T(C): 36.8 (06-09-25 @ 15:51), Max: 36.8 (06-09-25 @ 15:51)  T(F): 98.3 (06-09-25 @ 15:51), Max: 98.3 (06-09-25 @ 15:51)  HR: 80 (06-09-25 @ 15:51) (80 - 80)  BP: 118/62 (06-09-25 @ 15:51) (118/62 - 118/62)  BP(mean): --  RR: --  SpO2: --      Lipid Panel:
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
## 2025-06-10 NOTE — BH INPATIENT PSYCHIATRY PROGRESS NOTE - NSBHMSETHTCONTENT_PSY_A_CORE
Perseverative on paranoid ideations, not open to reality testing/Delusions/Preoccupations Delusions/Preoccupations

## 2025-06-11 LAB
DRUG SCREEN, SERUM: SIGNIFICANT CHANGE UP
GLUCOSE BLDC GLUCOMTR-MCNC: 127 MG/DL — HIGH (ref 70–99)
GLUCOSE BLDC GLUCOMTR-MCNC: 160 MG/DL — HIGH (ref 70–99)

## 2025-06-11 PROCEDURE — 99232 SBSQ HOSP IP/OBS MODERATE 35: CPT

## 2025-06-11 RX ADMIN — ATORVASTATIN CALCIUM 20 MILLIGRAM(S): 80 TABLET, FILM COATED ORAL at 20:35

## 2025-06-11 RX ADMIN — LURASIDONE HYDROCHLORIDE 40 MILLIGRAM(S): 120 TABLET, FILM COATED ORAL at 08:05

## 2025-06-11 RX ADMIN — METFORMIN HYDROCHLORIDE 500 MILLIGRAM(S): 850 TABLET ORAL at 08:05

## 2025-06-11 RX ADMIN — METFORMIN HYDROCHLORIDE 500 MILLIGRAM(S): 850 TABLET ORAL at 20:36

## 2025-06-11 NOTE — BH INPATIENT PSYCHIATRY PROGRESS NOTE - NSBHMETABOLIC_PSY_ALL_CORE_FT
BMI: BMI (kg/m2): 32.3 (06-08-25 @ 13:42)  HbA1c: A1C with Estimated Average Glucose Result: 6.6 % (06-07-25 @ 05:37)    Glucose: POCT Blood Glucose.: 107 mg/dL (06-08-25 @ 11:24)    BP: 118/62 (06-09-25 @ 15:51) (95/62 - 130/84)Vital Signs Last 24 Hrs  T(C): 36.8 (06-09-25 @ 15:51), Max: 36.8 (06-09-25 @ 15:51)  T(F): 98.3 (06-09-25 @ 15:51), Max: 98.3 (06-09-25 @ 15:51)  HR: 80 (06-09-25 @ 15:51) (80 - 80)  BP: 118/62 (06-09-25 @ 15:51) (118/62 - 118/62)  BP(mean): --  RR: --  SpO2: --      Lipid Panel:  BMI: BMI (kg/m2): 32.3 (06-08-25 @ 13:42)  HbA1c: A1C with Estimated Average Glucose Result: 6.6 % (06-07-25 @ 05:37)    Glucose: POCT Blood Glucose.: 127 mg/dL (06-08-25 @ 19:38)    BP: 126/83 (06-11-25 @ 16:14) (94/63 - 126/83)Vital Signs Last 24 Hrs  T(C): 36.4 (06-11-25 @ 16:14), Max: 36.6 (06-11-25 @ 08:48)  T(F): 97.5 (06-11-25 @ 16:14), Max: 97.8 (06-11-25 @ 08:48)  HR: 72 (06-11-25 @ 16:14) (65 - 72)  BP: 126/83 (06-11-25 @ 16:14) (94/63 - 126/83)  BP(mean): --  RR: --  SpO2: --      Lipid Panel:

## 2025-06-11 NOTE — BH INPATIENT PSYCHIATRY PROGRESS NOTE - NSBHASSESSSUMMFT_PSY_ALL_CORE
Patient is a 54 year old male, , domiciled with wife and 2 children, on disability, with PMH of type II diabetes and hypercholesteremia, past psychiatric history of schizoaffective disorder, not in outpatient care (previously saw Dr. Kulkarni up until 10/2024, where he was taking Lamictal, Fluoxetine, Latuda, Clonazepam), 2 prior IPP stays in 2018 for paranoia, no prior SA/NSSIB, substance use of alcohol (monthly), admitted for SA attempt by overdose on Klonopin. Admitted to IPP under 9.27 for SA attempt and one month of worsening psychosis (paranoid delusions, irritability).    Upon evaluation, patient appears irritable, with thought content notable for paranoid delusions. Patient is presenting with about one months duration of worsening psychosis, evident by paranoid delusions, suicide attempt by OD, poor sleep, increasing irritability, in the context of known schizoaffective disorder, treatment nonadherence (patient self-discontinued Lamictal, Fluoxetine, Latuda, Clonazepam in 10/2024 and stopped following with psychiatrist), and substance use (was intoxicated prior to SA). Patient would benefit from restarting Latuda, which he previously tolerated well, for his acute psychosis. However, patient has extremely poor insight his condition, leading him to refuse medication, so will likely need TOO order if he doesn't improve. Due to the severity of patient's SA and acute psychosis, patient requires continued IPP admission for safety, stabilization and medication management.     6/11: Again continues to improve - continues to hold less firmly onto paranoid delusions. Continues to agree to taking medications. Denies SI/HI/AVH. Continue current regimen - patient is improving.      Recommendations:  #Schizoaffective Disorder  - Continue Latuda 40mg PO daily with food for his psychosis  - Follow up TSH, Lipid - a1c already done    #Diabetes  - Resume home medication of Metformin 500mg BID    #Hypercholesteremia  - Patient takes at home, but non-formulary  - Start Atorvastatin 20mg PO QHS while admitted    #Other PRNs  - Atarax 50mg PO q6h PRN for anxiety  - Melatonin 3mg PO QHS PRN for insomnia  - Senna 2 tablets PO QHS PRN for constipation  - Maalox 30mL PO q6h PRN for dyspepsia  - Tylenol 650mg PO q6h PRN for temp/pain  - Guaifenesin 100mg PO q6h PRN for cough  - For acute agitation not amenable to verbal redirection, can give Haldol 5mg PO q6h PRN, Benadryl 50mg PO q6h PRN, and Ativan 2mg PO q6h PRN with escalation to IM if patient is a danger to self/others. If IM antipsychotic is administered, please obtain EKG to check the QTC; if the QTC>500 please hold Haldol, as it can further prolong the QTC.

## 2025-06-11 NOTE — BH INPATIENT PSYCHIATRY PROGRESS NOTE - NSBHCHARTREVIEWVS_PSY_A_CORE FT
Vital Signs Last 24 Hrs  T(C): 36.8 (06-09-25 @ 15:51), Max: 36.8 (06-09-25 @ 15:51)  T(F): 98.3 (06-09-25 @ 15:51), Max: 98.3 (06-09-25 @ 15:51)  HR: 80 (06-09-25 @ 15:51) (80 - 80)  BP: 118/62 (06-09-25 @ 15:51) (118/62 - 118/62)  BP(mean): --  RR: --  SpO2: --     Vital Signs Last 24 Hrs  T(C): 36.4 (06-11-25 @ 16:14), Max: 36.6 (06-11-25 @ 08:48)  T(F): 97.5 (06-11-25 @ 16:14), Max: 97.8 (06-11-25 @ 08:48)  HR: 72 (06-11-25 @ 16:14) (65 - 72)  BP: 126/83 (06-11-25 @ 16:14) (94/63 - 126/83)  BP(mean): --  RR: --  SpO2: --
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
## 2025-06-12 PROCEDURE — 99232 SBSQ HOSP IP/OBS MODERATE 35: CPT

## 2025-06-12 RX ADMIN — LURASIDONE HYDROCHLORIDE 40 MILLIGRAM(S): 120 TABLET, FILM COATED ORAL at 08:04

## 2025-06-12 RX ADMIN — METFORMIN HYDROCHLORIDE 500 MILLIGRAM(S): 850 TABLET ORAL at 20:02

## 2025-06-12 RX ADMIN — ATORVASTATIN CALCIUM 20 MILLIGRAM(S): 80 TABLET, FILM COATED ORAL at 20:02

## 2025-06-12 RX ADMIN — METFORMIN HYDROCHLORIDE 500 MILLIGRAM(S): 850 TABLET ORAL at 08:04
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
## 2025-06-12 NOTE — BH INPATIENT PSYCHIATRY PROGRESS NOTE - NSBHASSESSSUMMFT_PSY_ALL_CORE
Patient is a 54 year old male, , domiciled with wife and 2 children, on disability, with PMH of type II diabetes and hypercholesteremia, past psychiatric history of schizoaffective disorder, not in outpatient care (previously saw Dr. Kulkarni up until 10/2024, where he was taking Lamictal, Fluoxetine, Latuda, Clonazepam), 2 prior IPP stays in 2018 for paranoia, no prior SA/NSSIB, substance use of alcohol (monthly), admitted for SA attempt by overdose on Klonopin. Admitted to IPP under 9.27 for SA attempt and one month of worsening psychosis (paranoid delusions, irritability).    Upon evaluation, patient appears irritable, with thought content notable for paranoid delusions. Patient is presenting with about one months duration of worsening psychosis, evident by paranoid delusions, suicide attempt by OD, poor sleep, increasing irritability, in the context of known schizoaffective disorder, treatment nonadherence (patient self-discontinued Lamictal, Fluoxetine, Latuda, Clonazepam in 10/2024 and stopped following with psychiatrist), and substance use (was intoxicated prior to SA). Patient would benefit from restarting Latuda, which he previously tolerated well, for his acute psychosis. However, patient has extremely poor insight his condition, leading him to refuse medication, so will likely need TOO order if he doesn't improve. Due to the severity of patient's SA and acute psychosis, patient requires continued IPP admission for safety, stabilization and medication management.     6/12: Similar to prior. Again continues to improve - continues to hold less firmly onto paranoid delusions. Continues to agree to taking medications. Denies SI/HI/AVH. Continue current regimen - patient is improving.      Recommendations:  #Schizoaffective Disorder  - Continue Latuda 40mg PO daily with food for his psychosis  - Follow up TSH, Lipid - a1c already done    #Diabetes  - Resume home medication of Metformin 500mg BID    #Hypercholesteremia  - Patient takes at home, but non-formulary  - Start Atorvastatin 20mg PO QHS while admitted    #Other PRNs  - Atarax 50mg PO q6h PRN for anxiety  - Melatonin 3mg PO QHS PRN for insomnia  - Senna 2 tablets PO QHS PRN for constipation  - Maalox 30mL PO q6h PRN for dyspepsia  - Tylenol 650mg PO q6h PRN for temp/pain  - Guaifenesin 100mg PO q6h PRN for cough  - For acute agitation not amenable to verbal redirection, can give Haldol 5mg PO q6h PRN, Benadryl 50mg PO q6h PRN, and Ativan 2mg PO q6h PRN with escalation to IM if patient is a danger to self/others. If IM antipsychotic is administered, please obtain EKG to check the QTC; if the QTC>500 please hold Haldol, as it can further prolong the QTC.

## 2025-06-12 NOTE — BH INPATIENT PSYCHIATRY PROGRESS NOTE - CURRENT MEDICATION
RN ED Mental Health Handoff Note    DK    Does patient require 1:1? Yes    Hold and rights been given and documented for patient: Yes    Is the patient in  scrubs? Yes    Has the patient been searched? Yes    Is the 15 minute observation tool up to date? Yes    Was patient issued a welcome folder? Yes    Room check completed this shift: Yes    PSS3 and [place] Assessment/Reassessment this shift:    C-SSRS ([place])      Date and Time Q1 Wished to be Dead (Past Month) Q2 Suicidal Thoughts (Past Month) Q3 Suicidal Thought Method Q4 Suicidal Intent without Specific Plan Q5 Suicide Intent with Specific Plan Q6 Suicide Behavior (Lifetime) Within the Past 3 Months? Level of Risk per Screen Level of Risk per Screen User   05/22/24 1750 0-->no 0-->no -- -- -- -- -- -- no risks indicated BAQ   05/22/24 1631 0-->no 0-->no -- -- -- 0-->no -- -- no risks indicated RAT   05/22/24 1528 0-->no 0-->no -- -- -- 0-->no -- -- no risks indicated VENKATESH            Behavioral status of patient: Green    Code 21 called this shift? No    Use of restraints/seclusion this shift? No    Most recent vital signs:  Temp: 98.9  F (37.2  C) Temp src: Oral BP: 107/56 Pulse: 91   Resp: 18 SpO2: 97 % O2 Device: None (Room air)      Medications:  Scheduled medication compliance? Yes    PRN Meds administered this shift? No    Medications   acetaminophen (TYLENOL) tablet 650 mg (has no administration in time range)   LORazepam (ATIVAN) tablet 1 mg (has no administration in time range)   OLANZapine zydis (zyPREXA) ODT tab 10 mg (has no administration in time range)   melatonin tablet 3 mg (has no administration in time range)   hydrOXYzine HCl (ATARAX) tablet 25 mg (has no administration in time range)   albuterol (PROVENTIL HFA/VENTOLIN HFA) inhaler (has no administration in time range)   apixaban ANTICOAGULANT (ELIQUIS) tablet 5 mg (5 mg Oral $Given 5/25/24 3526)   lisinopril (ZESTRIL) tablet 5 mg (5 mg Oral $Given 5/25/24 2509)   methylphenidate HCl  ER (OSM) (CONCERTA) CR tablet 36 mg (36 mg Oral $Given 5/25/24 0944)   sertraline (ZOLOFT) tablet 150 mg (150 mg Oral $Given 5/25/24 0943)   tolterodine ER (DETROL LA) 24 hr capsule 4 mg (4 mg Oral $Given 5/25/24 1003)   Vitamin D3 (CHOLECALCIFEROL) tablet 25 mcg (25 mcg Oral $Given 5/25/24 0944)   QUEtiapine (SEROquel XR) 24 hr tablet 150 mg (150 mg Oral $Given 5/25/24 0002)         ADLs    Meal Provided this shift? No    Hygiene items provided? Yes    ADLs completed? No    Date of last shower: Unknown    Any significant events this shift? No    Any information that would be helpful in caring for this patient?  No    Family present/updated? No    Location of patient's belongings: Room and office    Critical Care Minutes:  Does the patient need critical care minutes documented? No                    MEDICATIONS  (STANDING):  atorvastatin 20 milliGRAM(s) Oral at bedtime  lurasidone 40 milliGRAM(s) Oral daily  metFORMIN 500 milliGRAM(s) Oral two times a day    MEDICATIONS  (PRN):  acetaminophen     Tablet .. 650 milliGRAM(s) Oral every 6 hours PRN Temp greater or equal to 38C (100.4F), Mild Pain (1 - 3), Moderate Pain (4 - 6)  aluminum hydroxide/magnesium hydroxide/simethicone Suspension 30 milliLiter(s) Oral every 6 hours PRN Dyspepsia  dextrose Oral Gel 15 Gram(s) Oral once PRN Blood Glucose LESS THAN 70 milliGRAM(s)/deciliter  diphenhydrAMINE 50 milliGRAM(s) Oral every 6 hours PRN Extrapyramidal prophylaxis  guaiFENesin Oral Liquid (Sugar-Free) 100 milliGRAM(s) Oral every 6 hours PRN cough  haloperidol     Tablet 5 milliGRAM(s) Oral every 6 hours PRN agitation  hydrOXYzine hydrochloride 50 milliGRAM(s) Oral every 6 hours PRN anxiety  LORazepam     Tablet 2 milliGRAM(s) Oral every 6 hours PRN Combative behavior  melatonin. 3 milliGRAM(s) Oral at bedtime PRN Insomnia  senna 2 Tablet(s) Oral at bedtime PRN Constipation   MEDICATIONS  (STANDING):  atorvastatin 20 milliGRAM(s) Oral at bedtime  lurasidone 20 milliGRAM(s) Oral at bedtime  metFORMIN 500 milliGRAM(s) Oral two times a day    MEDICATIONS  (PRN):  acetaminophen     Tablet .. 650 milliGRAM(s) Oral every 6 hours PRN Temp greater or equal to 38C (100.4F), Mild Pain (1 - 3), Moderate Pain (4 - 6)  aluminum hydroxide/magnesium hydroxide/simethicone Suspension 30 milliLiter(s) Oral every 6 hours PRN Dyspepsia  dextrose Oral Gel 15 Gram(s) Oral once PRN Blood Glucose LESS THAN 70 milliGRAM(s)/deciliter  diphenhydrAMINE 50 milliGRAM(s) Oral every 6 hours PRN Extrapyramidal prophylaxis  guaiFENesin Oral Liquid (Sugar-Free) 100 milliGRAM(s) Oral every 6 hours PRN cough  haloperidol     Tablet 5 milliGRAM(s) Oral every 6 hours PRN agitation  hydrOXYzine hydrochloride 50 milliGRAM(s) Oral every 6 hours PRN anxiety  LORazepam     Tablet 2 milliGRAM(s) Oral every 6 hours PRN Combative behavior  melatonin. 3 milliGRAM(s) Oral at bedtime PRN Insomnia  senna 2 Tablet(s) Oral at bedtime PRN Constipation

## 2025-06-12 NOTE — BH INPATIENT PSYCHIATRY PROGRESS NOTE - NSBHMETABOLIC_PSY_ALL_CORE_FT
BMI: BMI (kg/m2): 32.3 (06-08-25 @ 13:42)  HbA1c: A1C with Estimated Average Glucose Result: 6.6 % (06-07-25 @ 05:37)    Glucose: POCT Blood Glucose.: 127 mg/dL (06-08-25 @ 19:38)    BP: 126/83 (06-11-25 @ 16:14) (94/63 - 126/83)Vital Signs Last 24 Hrs  T(C): 36.4 (06-11-25 @ 16:14), Max: 36.6 (06-11-25 @ 08:48)  T(F): 97.5 (06-11-25 @ 16:14), Max: 97.8 (06-11-25 @ 08:48)  HR: 72 (06-11-25 @ 16:14) (65 - 72)  BP: 126/83 (06-11-25 @ 16:14) (94/63 - 126/83)  BP(mean): --  RR: --  SpO2: --      Lipid Panel:  BMI: BMI (kg/m2): 32.3 (06-08-25 @ 13:42)  HbA1c: A1C with Estimated Average Glucose Result: 6.6 % (06-07-25 @ 05:37)    Glucose: POCT Blood Glucose.: 127 mg/dL (06-08-25 @ 19:38)    BP: 149/90 (06-12-25 @ 16:31) (94/63 - 149/90)Vital Signs Last 24 Hrs  T(C): 36.7 (06-12-25 @ 16:31), Max: 36.7 (06-12-25 @ 16:31)  T(F): 98 (06-12-25 @ 16:31), Max: 98 (06-12-25 @ 16:31)  HR: 81 (06-12-25 @ 16:31) (81 - 81)  BP: 149/90 (06-12-25 @ 16:31) (149/90 - 149/90)  BP(mean): --  RR: --  SpO2: --      Lipid Panel:

## 2025-06-12 NOTE — BH INPATIENT PSYCHIATRY PROGRESS NOTE - NSBHCHARTREVIEWVS_PSY_A_CORE FT
Vital Signs Last 24 Hrs  T(C): 36.4 (06-11-25 @ 16:14), Max: 36.6 (06-11-25 @ 08:48)  T(F): 97.5 (06-11-25 @ 16:14), Max: 97.8 (06-11-25 @ 08:48)  HR: 72 (06-11-25 @ 16:14) (65 - 72)  BP: 126/83 (06-11-25 @ 16:14) (94/63 - 126/83)  BP(mean): --  RR: --  SpO2: --     Vital Signs Last 24 Hrs  T(C): 36.7 (06-12-25 @ 16:31), Max: 36.7 (06-12-25 @ 16:31)  T(F): 98 (06-12-25 @ 16:31), Max: 98 (06-12-25 @ 16:31)  HR: 81 (06-12-25 @ 16:31) (81 - 81)  BP: 149/90 (06-12-25 @ 16:31) (149/90 - 149/90)  BP(mean): --  RR: --  SpO2: --

## 2025-06-13 DIAGNOSIS — F32.A DEPRESSION, UNSPECIFIED: ICD-10-CM

## 2025-06-13 DIAGNOSIS — E78.5 HYPERLIPIDEMIA, UNSPECIFIED: ICD-10-CM

## 2025-06-13 DIAGNOSIS — E11.9 TYPE 2 DIABETES MELLITUS WITHOUT COMPLICATIONS: ICD-10-CM

## 2025-06-13 DIAGNOSIS — F25.9 SCHIZOAFFECTIVE DISORDER, UNSPECIFIED: ICD-10-CM

## 2025-06-13 DIAGNOSIS — R45.851 SUICIDAL IDEATIONS: ICD-10-CM
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
## 2025-06-13 DIAGNOSIS — Z79.84 LONG TERM (CURRENT) USE OF ORAL HYPOGLYCEMIC DRUGS: ICD-10-CM

## 2025-06-13 PROCEDURE — 99232 SBSQ HOSP IP/OBS MODERATE 35: CPT

## 2025-06-13 RX ORDER — LURASIDONE HYDROCHLORIDE 120 MG/1
60 TABLET, FILM COATED ORAL AT BEDTIME
Refills: 0 | Status: DISCONTINUED | OUTPATIENT
Start: 2025-06-14 | End: 2025-06-18

## 2025-06-13 RX ORDER — LURASIDONE HYDROCHLORIDE 120 MG/1
20 TABLET, FILM COATED ORAL AT BEDTIME
Refills: 0 | Status: COMPLETED | OUTPATIENT
Start: 2025-06-13 | End: 2025-06-13

## 2025-06-13 RX ADMIN — METFORMIN HYDROCHLORIDE 500 MILLIGRAM(S): 850 TABLET ORAL at 08:04

## 2025-06-13 RX ADMIN — LURASIDONE HYDROCHLORIDE 20 MILLIGRAM(S): 120 TABLET, FILM COATED ORAL at 20:32

## 2025-06-13 RX ADMIN — ATORVASTATIN CALCIUM 20 MILLIGRAM(S): 80 TABLET, FILM COATED ORAL at 20:32

## 2025-06-13 RX ADMIN — LURASIDONE HYDROCHLORIDE 40 MILLIGRAM(S): 120 TABLET, FILM COATED ORAL at 08:04

## 2025-06-13 RX ADMIN — METFORMIN HYDROCHLORIDE 500 MILLIGRAM(S): 850 TABLET ORAL at 20:32

## 2025-06-13 NOTE — BH INPATIENT PSYCHIATRY PROGRESS NOTE - CURRENT MEDICATION
MEDICATIONS  (STANDING):  atorvastatin 20 milliGRAM(s) Oral at bedtime  lurasidone 40 milliGRAM(s) Oral daily  metFORMIN 500 milliGRAM(s) Oral two times a day    MEDICATIONS  (PRN):  acetaminophen     Tablet .. 650 milliGRAM(s) Oral every 6 hours PRN Temp greater or equal to 38C (100.4F), Mild Pain (1 - 3), Moderate Pain (4 - 6)  aluminum hydroxide/magnesium hydroxide/simethicone Suspension 30 milliLiter(s) Oral every 6 hours PRN Dyspepsia  dextrose Oral Gel 15 Gram(s) Oral once PRN Blood Glucose LESS THAN 70 milliGRAM(s)/deciliter  diphenhydrAMINE 50 milliGRAM(s) Oral every 6 hours PRN Extrapyramidal prophylaxis  guaiFENesin Oral Liquid (Sugar-Free) 100 milliGRAM(s) Oral every 6 hours PRN cough  haloperidol     Tablet 5 milliGRAM(s) Oral every 6 hours PRN agitation  hydrOXYzine hydrochloride 50 milliGRAM(s) Oral every 6 hours PRN anxiety  LORazepam     Tablet 2 milliGRAM(s) Oral every 6 hours PRN Combative behavior  melatonin. 3 milliGRAM(s) Oral at bedtime PRN Insomnia  senna 2 Tablet(s) Oral at bedtime PRN Constipation   MEDICATIONS  (STANDING):  atorvastatin 20 milliGRAM(s) Oral at bedtime  lurasidone 20 milliGRAM(s) Oral at bedtime  metFORMIN 500 milliGRAM(s) Oral two times a day    MEDICATIONS  (PRN):  acetaminophen     Tablet .. 650 milliGRAM(s) Oral every 6 hours PRN Temp greater or equal to 38C (100.4F), Mild Pain (1 - 3), Moderate Pain (4 - 6)  aluminum hydroxide/magnesium hydroxide/simethicone Suspension 30 milliLiter(s) Oral every 6 hours PRN Dyspepsia  dextrose Oral Gel 15 Gram(s) Oral once PRN Blood Glucose LESS THAN 70 milliGRAM(s)/deciliter  diphenhydrAMINE 50 milliGRAM(s) Oral every 6 hours PRN Extrapyramidal prophylaxis  guaiFENesin Oral Liquid (Sugar-Free) 100 milliGRAM(s) Oral every 6 hours PRN cough  haloperidol     Tablet 5 milliGRAM(s) Oral every 6 hours PRN agitation  hydrOXYzine hydrochloride 50 milliGRAM(s) Oral every 6 hours PRN anxiety  LORazepam     Tablet 2 milliGRAM(s) Oral every 6 hours PRN Combative behavior  melatonin. 3 milliGRAM(s) Oral at bedtime PRN Insomnia  senna 2 Tablet(s) Oral at bedtime PRN Constipation

## 2025-06-13 NOTE — BH INPATIENT PSYCHIATRY PROGRESS NOTE - NSBHFUPINTERVALHXFT_PSY_A_CORE
Chart reviewed and patient seen.    Patient is found in milieu and prefers to complete psychiatry interview in hallway. Patient is alert and cooperative and engages with interview. He continues to do well, again reporting good mood, no issues with eating/sleeping, and denies SI/HI/AVH. He again describes positive/supportive visits from his wife/children. His plans to continue taking his medications have not changed. We discuss increasing patient's Latuda from 40 to 60mg given that patient is currently doing well in a controlled environment in [facility] and will likely need a higher dose for when he is discharged home and the environment is less controlled with more stressor/stimulation. Patient is in agreement. He would like Latuda switched to QHS instead of morning as it makes him somewhat sleepy. We agree we will add 20mg tonight and start 60mg QHS tomorrow night.

## 2025-06-13 NOTE — BH INPATIENT PSYCHIATRY PROGRESS NOTE - NSBHASSESSSUMMFT_PSY_ALL_CORE
Patient is a 54 year old male, , domiciled with wife and 2 children, on disability, with PMH of type II diabetes and hypercholesteremia, past psychiatric history of schizoaffective disorder, not in outpatient care (previously saw Dr. Kulkarni up until 10/2024, where he was taking Lamictal, Fluoxetine, Latuda, Clonazepam), 2 prior IPP stays in 2018 for paranoia, no prior SA/NSSIB, substance use of alcohol (monthly), admitted for SA attempt by overdose on Klonopin. Admitted to IPP under 9.27 for SA attempt and one month of worsening psychosis (paranoid delusions, irritability).    Upon evaluation, patient appears irritable, with thought content notable for paranoid delusions. Patient is presenting with about one months duration of worsening psychosis, evident by paranoid delusions, suicide attempt by OD, poor sleep, increasing irritability, in the context of known schizoaffective disorder, treatment nonadherence (patient self-discontinued Lamictal, Fluoxetine, Latuda, Clonazepam in 10/2024 and stopped following with psychiatrist), and substance use (was intoxicated prior to SA). Patient would benefit from restarting Latuda, which he previously tolerated well, for his acute psychosis. However, patient has extremely poor insight his condition, leading him to refuse medication, so will likely need TOO order if he doesn't improve. Due to the severity of patient's SA and acute psychosis, patient requires continued IPP admission for safety, stabilization and medication management.     6/13: Doing well and continues to improve. Continues to agree to taking medications. Denies SI/HI/AVH. Increase Latuda from 40mg PO daily to 60mg PO QHS.      Recommendations:  #Schizoaffective Disorder  - Increase Latuda from 40mg PO daily to 60mg PO QHS with food for his psychosis  - Follow up TSH, Lipid - a1c already done    #Diabetes  - Resume home medication of Metformin 500mg BID    #Hypercholesteremia  - Patient takes at home, but non-formulary  - Start Atorvastatin 20mg PO QHS while admitted    #Other PRNs  - Atarax 50mg PO q6h PRN for anxiety  - Melatonin 3mg PO QHS PRN for insomnia  - Senna 2 tablets PO QHS PRN for constipation  - Maalox 30mL PO q6h PRN for dyspepsia  - Tylenol 650mg PO q6h PRN for temp/pain  - Guaifenesin 100mg PO q6h PRN for cough  - For acute agitation not amenable to verbal redirection, can give Haldol 5mg PO q6h PRN, Benadryl 50mg PO q6h PRN, and Ativan 2mg PO q6h PRN with escalation to IM if patient is a danger to self/others. If IM antipsychotic is administered, please obtain EKG to check the QTC; if the QTC>500 please hold Haldol, as it can further prolong the QTC.

## 2025-06-14 RX ADMIN — METFORMIN HYDROCHLORIDE 500 MILLIGRAM(S): 850 TABLET ORAL at 20:04

## 2025-06-14 RX ADMIN — METFORMIN HYDROCHLORIDE 500 MILLIGRAM(S): 850 TABLET ORAL at 08:04

## 2025-06-14 RX ADMIN — LURASIDONE HYDROCHLORIDE 60 MILLIGRAM(S): 120 TABLET, FILM COATED ORAL at 20:04

## 2025-06-14 RX ADMIN — ATORVASTATIN CALCIUM 20 MILLIGRAM(S): 80 TABLET, FILM COATED ORAL at 20:04

## 2025-06-14 RX ADMIN — Medication 3 MILLIGRAM(S): at 22:34

## 2025-06-15 RX ADMIN — METFORMIN HYDROCHLORIDE 500 MILLIGRAM(S): 850 TABLET ORAL at 08:04

## 2025-06-15 RX ADMIN — ATORVASTATIN CALCIUM 20 MILLIGRAM(S): 80 TABLET, FILM COATED ORAL at 20:18

## 2025-06-15 RX ADMIN — Medication 3 MILLIGRAM(S): at 22:58

## 2025-06-15 RX ADMIN — METFORMIN HYDROCHLORIDE 500 MILLIGRAM(S): 850 TABLET ORAL at 20:18

## 2025-06-15 RX ADMIN — LURASIDONE HYDROCHLORIDE 60 MILLIGRAM(S): 120 TABLET, FILM COATED ORAL at 20:18

## 2025-06-15 NOTE — BH SAFETY PLAN - ENVIRONMENT SAFETY 2:
Another way I would make my environment safe would be to try and stay away from certain people, places and things that may trigger me.

## 2025-06-15 NOTE — BH SAFETY PLAN - WARNING SIGN 3
Also, another warning sign that a crisis is developing would be when my behavior can be inappropriate.

## 2025-06-15 NOTE — BH SAFETY PLAN - WARNING SIGN 2
Another warning sign that a crisis is developing would be when I feel like I am misunderstood all the time.

## 2025-06-15 NOTE — BH SAFETY PLAN - DISTRACTION PLACE 2
Another place that is safe and will provide a distraction for me would be outside for some fresh air.

## 2025-06-15 NOTE — BH SAFETY PLAN - THE ONE THING THAT IS MOST IMPORTANT TO ME AND WORTH LIVING FOR IS:
The one thing that is most important to me and worth living for would be for me to want to live and love again.

## 2025-06-16 PROCEDURE — 99231 SBSQ HOSP IP/OBS SF/LOW 25: CPT

## 2025-06-16 RX ADMIN — Medication 3 MILLIGRAM(S): at 22:16

## 2025-06-16 RX ADMIN — METFORMIN HYDROCHLORIDE 500 MILLIGRAM(S): 850 TABLET ORAL at 08:18

## 2025-06-16 RX ADMIN — LURASIDONE HYDROCHLORIDE 60 MILLIGRAM(S): 120 TABLET, FILM COATED ORAL at 20:14

## 2025-06-16 RX ADMIN — METFORMIN HYDROCHLORIDE 500 MILLIGRAM(S): 850 TABLET ORAL at 20:14

## 2025-06-16 RX ADMIN — ATORVASTATIN CALCIUM 20 MILLIGRAM(S): 80 TABLET, FILM COATED ORAL at 20:15

## 2025-06-16 NOTE — BH INPATIENT PSYCHIATRY PROGRESS NOTE - NSBHFUPINTERVALHXFT_PSY_A_CORE
Chart reviewed and patient seen.    Patient is found in milieu and agrees to return to room for interview. Patient is alert and cooperative and engages with interview. He continues to do well, again reporting good mood, no issues with eating/sleeping, and denies SI/HI/AVH. He again describes positive/supportive visits from his wife/children, pointing to letters they have given him. His plans to continue taking his medications have not changed, referencing his wife as a reinforcing factor. Insight into paranoid delusions continues to improve. Tolerated increased dose of Latuda from 40 to 60mg with no overt side effects noted. We discuss outpatient follow up options as discharge is tentatively set for Wednesday. Chart reviewed and patient seen.    Patient is found in milieu and agrees to return to room for interview. Patient is alert and cooperative and engages with interview. He continues to do well, again reporting good mood, no issues with eating/sleeping, and denies SI/HI/AVH. He again describes positive/supportive visits from his wife/children, pointing to letters they have given him. His plans to continue taking his medications have not changed, referencing his wife as a reinforcing factor. Insight into paranoid delusions continues to improve. Tolerated increased dose of Latuda from 40 to 60mg with no overt side effects noted.

## 2025-06-16 NOTE — BH TREATMENT PLAN - NSDCCRITERIA_PSY_ALL_CORE
Patient is no longer acutely psychotic nor having suicidal ideations. Able to safety plan.
Patient is no longer acutely psychotic nor having suicidal ideations. Able to safety plan.

## 2025-06-16 NOTE — BH INPATIENT PSYCHIATRY PROGRESS NOTE - NSBHASSESSSUMMFT_PSY_ALL_CORE
Patient is a 54 year old male, , domiciled with wife and 2 children, on disability, with PMH of type II diabetes and hypercholesteremia, past psychiatric history of schizoaffective disorder, not in outpatient care (previously saw Dr. Kulkarni up until 10/2024, where he was taking Lamictal, Fluoxetine, Latuda, Clonazepam), 2 prior IPP stays in 2018 for paranoia, no prior SA/NSSIB, substance use of alcohol (monthly), admitted for SA attempt by overdose on Klonopin. Admitted to IPP under 9.27 for SA attempt and one month of worsening psychosis (paranoid delusions, irritability).    Upon evaluation, patient appears irritable, with thought content notable for paranoid delusions. Patient is presenting with about one months duration of worsening psychosis, evident by paranoid delusions, suicide attempt by OD, poor sleep, increasing irritability, in the context of known schizoaffective disorder, treatment nonadherence (patient self-discontinued Lamictal, Fluoxetine, Latuda, Clonazepam in 10/2024 and stopped following with psychiatrist), and substance use (was intoxicated prior to SA). Patient would benefit from restarting Latuda, which he previously tolerated well, for his acute psychosis. However, patient has extremely poor insight his condition, leading him to refuse medication, so will likely need TOO order if he doesn't improve. Due to the severity of patient's SA and acute psychosis, patient requires continued IPP admission for safety, stabilization and medication management.     6/16: Continues to do well. Continues to agree to taking medications. Denies SI/HI/AVH. Tolerated increased dose of Latuda from 40mg PO daily to 60mg PO QHS without overt side effects noted. Tentative discharge Wednesday.    Recommendations:  #Schizoaffective Disorder  - Increase Latuda from 40mg PO daily to 60mg PO QHS with food for his psychosis  - Follow up TSH, Lipid - a1c already done    #Diabetes  - Resume home medication of Metformin 500mg BID    #Hypercholesteremia  - Patient takes at home, but non-formulary  - Start Atorvastatin 20mg PO QHS while admitted    #Other PRNs  - Atarax 50mg PO q6h PRN for anxiety  - Melatonin 3mg PO QHS PRN for insomnia  - Senna 2 tablets PO QHS PRN for constipation  - Maalox 30mL PO q6h PRN for dyspepsia  - Tylenol 650mg PO q6h PRN for temp/pain  - Guaifenesin 100mg PO q6h PRN for cough  - For acute agitation not amenable to verbal redirection, can give Haldol 5mg PO q6h PRN, Benadryl 50mg PO q6h PRN, and Ativan 2mg PO q6h PRN with escalation to IM if patient is a danger to self/others. If IM antipsychotic is administered, please obtain EKG to check the QTC; if the QTC>500 please hold Haldol, as it can further prolong the QTC.   Patient is a 54 year old male, , domiciled with wife and 2 children, on disability, with PMH of type II diabetes and hypercholesteremia, past psychiatric history of schizoaffective disorder, not in outpatient care (previously saw Dr. Kulkarni up until 10/2024, where he was taking Lamictal, Fluoxetine, Latuda, Clonazepam), 2 prior IPP stays in 2018 for paranoia, no prior SA/NSSIB, substance use of alcohol (monthly), admitted for SA attempt by overdose on Klonopin. Admitted to IPP under 9.27 for SA attempt and one month of worsening psychosis (paranoid delusions, irritability).    Upon evaluation, patient appears irritable, with thought content notable for paranoid delusions. Patient is presenting with about one months duration of worsening psychosis, evident by paranoid delusions, suicide attempt by OD, poor sleep, increasing irritability, in the context of known schizoaffective disorder, treatment nonadherence (patient self-discontinued Lamictal, Fluoxetine, Latuda, Clonazepam in 10/2024 and stopped following with psychiatrist), and substance use (was intoxicated prior to SA). Patient would benefit from restarting Latuda, which he previously tolerated well, for his acute psychosis. However, patient has extremely poor insight his condition, leading him to refuse medication, so will likely need TOO order if he doesn't improve. Due to the severity of patient's SA and acute psychosis, patient requires continued IPP admission for safety, stabilization and medication management.     6/16: Continues to do well. Continues to agree to taking medications. Denies SI/HI/AVH. Tolerated increased dose of Latuda from 40mg PO daily to 60mg PO QHS without overt side effects noted.    Recommendations:  #Schizoaffective Disorder  - Increase Latuda from 40mg PO daily to 60mg PO QHS with food for his psychosis  - Follow up TSH, Lipid - a1c already done    #Diabetes  - Resume home medication of Metformin 500mg BID    #Hypercholesteremia  - Patient takes at home, but non-formulary  - Start Atorvastatin 20mg PO QHS while admitted    #Other PRNs  - Atarax 50mg PO q6h PRN for anxiety  - Melatonin 3mg PO QHS PRN for insomnia  - Senna 2 tablets PO QHS PRN for constipation  - Maalox 30mL PO q6h PRN for dyspepsia  - Tylenol 650mg PO q6h PRN for temp/pain  - Guaifenesin 100mg PO q6h PRN for cough  - For acute agitation not amenable to verbal redirection, can give Haldol 5mg PO q6h PRN, Benadryl 50mg PO q6h PRN, and Ativan 2mg PO q6h PRN with escalation to IM if patient is a danger to self/others. If IM antipsychotic is administered, please obtain EKG to check the QTC; if the QTC>500 please hold Haldol, as it can further prolong the QTC.

## 2025-06-16 NOTE — BH TREATMENT PLAN - NSTXPATIENTPARTICIPATE_PSY_ALL_CORE
No, patient unwilling to participate
Patient participated in identification of needs/problems/goals for treatment

## 2025-06-16 NOTE — BH INPATIENT PSYCHIATRY PROGRESS NOTE - NSBHCHARTREVIEWVS_PSY_A_CORE FT
Vital Signs Last 24 Hrs  T(C): 36.7 (06-12-25 @ 16:31), Max: 36.7 (06-12-25 @ 16:31)  T(F): 98 (06-12-25 @ 16:31), Max: 98 (06-12-25 @ 16:31)  HR: 81 (06-12-25 @ 16:31) (81 - 81)  BP: 149/90 (06-12-25 @ 16:31) (149/90 - 149/90)  BP(mean): --  RR: --  SpO2: --     Vital Signs Last 24 Hrs  T(C): 36.6 (06-16-25 @ 05:35), Max: 37 (06-15-25 @ 15:56)  T(F): 97.8 (06-16-25 @ 05:35), Max: 98.6 (06-15-25 @ 15:56)  HR: 79 (06-16-25 @ 08:36) (79 - 89)  BP: 127/84 (06-16-25 @ 08:36) (116/82 - 131/81)  BP(mean): --  RR: --  SpO2: --

## 2025-06-16 NOTE — BH TREATMENT PLAN - NSCMSPTSTRENGTHS_PSY_ALL_CORE
Expressive of emotions/Future/goal oriented/Supportive family
Expressive of emotions/Future/goal oriented/Supportive family

## 2025-06-16 NOTE — BH TREATMENT PLAN - NSTXDISORGINTERPR_PSY_ALL_CORE
WA will offer support and provide encouragement to attend RT sessions to develop coping skills and explore leisure interests while recovering on the unit

## 2025-06-16 NOTE — BH INPATIENT PSYCHIATRY PROGRESS NOTE - CURRENT MEDICATION
MEDICATIONS  (STANDING):  atorvastatin 20 milliGRAM(s) Oral at bedtime  lurasidone 20 milliGRAM(s) Oral at bedtime  metFORMIN 500 milliGRAM(s) Oral two times a day    MEDICATIONS  (PRN):  acetaminophen     Tablet .. 650 milliGRAM(s) Oral every 6 hours PRN Temp greater or equal to 38C (100.4F), Mild Pain (1 - 3), Moderate Pain (4 - 6)  aluminum hydroxide/magnesium hydroxide/simethicone Suspension 30 milliLiter(s) Oral every 6 hours PRN Dyspepsia  dextrose Oral Gel 15 Gram(s) Oral once PRN Blood Glucose LESS THAN 70 milliGRAM(s)/deciliter  diphenhydrAMINE 50 milliGRAM(s) Oral every 6 hours PRN Extrapyramidal prophylaxis  guaiFENesin Oral Liquid (Sugar-Free) 100 milliGRAM(s) Oral every 6 hours PRN cough  haloperidol     Tablet 5 milliGRAM(s) Oral every 6 hours PRN agitation  hydrOXYzine hydrochloride 50 milliGRAM(s) Oral every 6 hours PRN anxiety  LORazepam     Tablet 2 milliGRAM(s) Oral every 6 hours PRN Combative behavior  melatonin. 3 milliGRAM(s) Oral at bedtime PRN Insomnia  senna 2 Tablet(s) Oral at bedtime PRN Constipation   MEDICATIONS  (STANDING):  atorvastatin 20 milliGRAM(s) Oral at bedtime  lurasidone 60 milliGRAM(s) Oral at bedtime  metFORMIN 500 milliGRAM(s) Oral two times a day    MEDICATIONS  (PRN):  acetaminophen     Tablet .. 650 milliGRAM(s) Oral every 6 hours PRN Temp greater or equal to 38C (100.4F), Mild Pain (1 - 3), Moderate Pain (4 - 6)  aluminum hydroxide/magnesium hydroxide/simethicone Suspension 30 milliLiter(s) Oral every 6 hours PRN Dyspepsia  dextrose Oral Gel 15 Gram(s) Oral once PRN Blood Glucose LESS THAN 70 milliGRAM(s)/deciliter  diphenhydrAMINE 50 milliGRAM(s) Oral every 6 hours PRN Extrapyramidal prophylaxis  guaiFENesin Oral Liquid (Sugar-Free) 100 milliGRAM(s) Oral every 6 hours PRN cough  haloperidol     Tablet 5 milliGRAM(s) Oral every 6 hours PRN agitation  hydrOXYzine hydrochloride 50 milliGRAM(s) Oral every 6 hours PRN anxiety  melatonin. 3 milliGRAM(s) Oral at bedtime PRN Insomnia  senna 2 Tablet(s) Oral at bedtime PRN Constipation

## 2025-06-16 NOTE — BH INPATIENT PSYCHIATRY PROGRESS NOTE - PRN MEDS
MEDICATIONS  (PRN):  acetaminophen     Tablet .. 650 milliGRAM(s) Oral every 6 hours PRN Temp greater or equal to 38C (100.4F), Mild Pain (1 - 3), Moderate Pain (4 - 6)  aluminum hydroxide/magnesium hydroxide/simethicone Suspension 30 milliLiter(s) Oral every 6 hours PRN Dyspepsia  dextrose Oral Gel 15 Gram(s) Oral once PRN Blood Glucose LESS THAN 70 milliGRAM(s)/deciliter  diphenhydrAMINE 50 milliGRAM(s) Oral every 6 hours PRN Extrapyramidal prophylaxis  guaiFENesin Oral Liquid (Sugar-Free) 100 milliGRAM(s) Oral every 6 hours PRN cough  haloperidol     Tablet 5 milliGRAM(s) Oral every 6 hours PRN agitation  hydrOXYzine hydrochloride 50 milliGRAM(s) Oral every 6 hours PRN anxiety  LORazepam     Tablet 2 milliGRAM(s) Oral every 6 hours PRN Combative behavior  melatonin. 3 milliGRAM(s) Oral at bedtime PRN Insomnia  senna 2 Tablet(s) Oral at bedtime PRN Constipation   MEDICATIONS  (PRN):  acetaminophen     Tablet .. 650 milliGRAM(s) Oral every 6 hours PRN Temp greater or equal to 38C (100.4F), Mild Pain (1 - 3), Moderate Pain (4 - 6)  aluminum hydroxide/magnesium hydroxide/simethicone Suspension 30 milliLiter(s) Oral every 6 hours PRN Dyspepsia  dextrose Oral Gel 15 Gram(s) Oral once PRN Blood Glucose LESS THAN 70 milliGRAM(s)/deciliter  diphenhydrAMINE 50 milliGRAM(s) Oral every 6 hours PRN Extrapyramidal prophylaxis  guaiFENesin Oral Liquid (Sugar-Free) 100 milliGRAM(s) Oral every 6 hours PRN cough  haloperidol     Tablet 5 milliGRAM(s) Oral every 6 hours PRN agitation  hydrOXYzine hydrochloride 50 milliGRAM(s) Oral every 6 hours PRN anxiety  melatonin. 3 milliGRAM(s) Oral at bedtime PRN Insomnia  senna 2 Tablet(s) Oral at bedtime PRN Constipation

## 2025-06-16 NOTE — BH INPATIENT PSYCHIATRY PROGRESS NOTE - NSBHMETABOLIC_PSY_ALL_CORE_FT
BMI: BMI (kg/m2): 32.3 (06-08-25 @ 13:42)  HbA1c: A1C with Estimated Average Glucose Result: 6.6 % (06-07-25 @ 05:37)    Glucose: POCT Blood Glucose.: 127 mg/dL (06-08-25 @ 19:38)    BP: 149/90 (06-12-25 @ 16:31) (94/63 - 149/90)Vital Signs Last 24 Hrs  T(C): 36.7 (06-12-25 @ 16:31), Max: 36.7 (06-12-25 @ 16:31)  T(F): 98 (06-12-25 @ 16:31), Max: 98 (06-12-25 @ 16:31)  HR: 81 (06-12-25 @ 16:31) (81 - 81)  BP: 149/90 (06-12-25 @ 16:31) (149/90 - 149/90)  BP(mean): --  RR: --  SpO2: --      Lipid Panel:  BMI: BMI (kg/m2): 32.3 (06-08-25 @ 13:42)  HbA1c: A1C with Estimated Average Glucose Result: 6.6 % (06-07-25 @ 05:37)    Glucose: POCT Blood Glucose.: 127 mg/dL (06-08-25 @ 19:38)    BP: 127/84 (06-16-25 @ 08:36) (116/82 - 142/82)Vital Signs Last 24 Hrs  T(C): 36.6 (06-16-25 @ 05:35), Max: 37 (06-15-25 @ 15:56)  T(F): 97.8 (06-16-25 @ 05:35), Max: 98.6 (06-15-25 @ 15:56)  HR: 79 (06-16-25 @ 08:36) (79 - 89)  BP: 127/84 (06-16-25 @ 08:36) (116/82 - 131/81)  BP(mean): --  RR: --  SpO2: --      Lipid Panel:

## 2025-06-16 NOTE — BH DISCHARGE NOTE NURSING/SOCIAL WORK/PSYCH REHAB - NSCDUDCCRISIS_PSY_A_CORE
[facility]  1 [phone]) [facility] ([phone][facility]  Text "WELL" to [phone]  Website: [email or web address] Suicide Prevention Lifeline 4 [phone] [phone]/988 Suicide and Crisis Lifeline

## 2025-06-16 NOTE — BH TREATMENT PLAN - NSTXPLANTHERAPYSESSIONSFT_PSY_ALL_CORE
06-10-25  Type of therapy: Coping skills, Creative arts therapy, Inspiration and motiviation, Leisure development, Self esteem, Social skills training, Stress management, Symptom management  Type of session: Individual  Level of patient participation: Resistance to participation  Duration of participation: 15 minutes  Therapy conducted by: Psych rehab  Therapy Summary: Pt is usually spending time in his room but was seen ambulating the hallways and asking staff for assistance throughout the shift. Pt has not been irritable with writer thus far and was receptive of soothing items    06-11-25  Type of therapy: Dialectical behavior therapy, Coping skills  Type of session: Group  Level of patient participation: Attentive, Engaged, Participates  Duration of participation: 45 minutes  Therapy conducted by: Social work  Therapy Summary: Patient attended the Crisis Skills Group with  and peers. The PLEASE skill was reviewed which focuses on taking care of basic needs so that one can make healthier decisions and be less vulnerable to emotional disruptions. Skills reviewed include treating physical illness, getting adequate sleep, avoiding illicit substances, eating a healthy diet and getting exercise. Patients offered support and feedback while  facilitated the group.    Wilder participated in the dialogue. He appeared open to the therapeutic intervention. He shared some of the details of his admission with peers. He remained in good behavioral control for the duration of the group.    06-11-25  Type of therapy: Coping skills, Transition planning  Type of session: Group  Level of patient participation: Engaged  Duration of participation: 15 minutes  Therapy conducted by: Social work  Therapy Summary: Patient attended the discharge planning group with  and peers. Topics explored include: Plans for discharge (follow up appointments), effective coping skills and social supports. The importance of medication management was discussed.  answered questions and provided support.     Wilder participated and appeared open to learning more about the discharge planning process. He left the group early due to a (family) visit. He is encouraged to attend future sessions.    06-13-25  Type of therapy: Dialectical behavior therapy, Coping skills  Type of session: Group  Level of patient participation: Attentive, Engaged, Participates  Duration of participation: 60 minutes  Therapy conducted by: Social work  Therapy Summary: Patient attended the Crisis Skills Group with  and peers. “Urge Surfing” was taught which helps with managing unwanted behaviors by recognizing the urge, riding it out, and acknowledging thoughts and feelings without trying to change or suppress them. Managing triggers and using delay and distraction skills were also reviewed. Patients offered support and feedback while  facilitated the group.    Wilder was an active participant. He appeared open to the therapeutic intervention. He was able to identify healthy distraction skills such as exercise, watching television and listening to music. He engaged well with peers, remaining in good behavioral control.    06-16-25  Type of therapy: Addiction education  Type of session: Group  Level of patient participation: Attentive, Participates  Duration of participation: 30 minutes  Therapy conducted by: Nursing  --  
  06-09-25  Type of therapy: Other, nursing discussion group  Type of session: Group  Level of patient participation: Participated with encouragement  Duration of participation: 30 minutes  Therapy conducted by: Nursing  --

## 2025-06-16 NOTE — BH DISCHARGE NOTE NURSING/SOCIAL WORK/PSYCH REHAB - PATIENT PORTAL LINK FT
You can access the FollowMyHealth Patient Portal offered by [facility] by registering at the following website: [email or web address]. By joining [employer]’s FollowMyHealth portal, you will also be able to view your health information using other applications (apps) compatible with our system.

## 2025-06-16 NOTE — BH TREATMENT PLAN - NSTXDISORGINTERRN_PSY_ALL_CORE
RN to assess patients behavior and be alert for increased signs of impulsivity/agitation.  RN to redirect patient and provide low stimulating and calming activites  RN to encourage medication compliance and provide support and education as needed on Dx, coping skills, medication, and safety planning.  RN to Encourage daily ADL's  RN to Encourage group attendance  RN will assess and intervene for any disorganized behavior
RN to assess patients behavior and be alert for increased signs of impulsivity/agitation.  RN to redirect patient and provide low stimulating and calming activites  RN to encourage medication compliance and provide support and education as needed on Dx, coping skills, medication, and safety planning.  RN to Encourage daily ADL's  RN to Encourage group attendance  RN will assess and intervene for any disorganized behavior

## 2025-06-16 NOTE — BH TREATMENT PLAN - NSTXDISORGINTERSW_PSY_ALL_CORE
SW will continue to provide support, contact, education and referrals for healthy coping skills and be able to name and utilize coping skills. SW will encourage patients to be visible on the unit and participate in groups.
SW will continue to provide support, contact, education and referrals for healthy coping skills and be able to name and utilize coping skills. SW will encourage patients to be visible on the unit and participate in groups.

## 2025-06-16 NOTE — BH DISCHARGE NOTE NURSING/SOCIAL WORK/PSYCH REHAB - FINANCIAL ASSISTANCE
[facility] provides services at a reduced cost to those who are determined to be eligible through [facility]’s financial assistance program. Information regarding [facility]’s financial assistance program can be found by going to [email or web address] or by calling [phone].

## 2025-06-17 PROCEDURE — 99231 SBSQ HOSP IP/OBS SF/LOW 25: CPT

## 2025-06-17 RX ORDER — LURASIDONE HYDROCHLORIDE 120 MG/1
1 TABLET, FILM COATED ORAL
Qty: 15 | Refills: 0
Start: 2025-06-17 | End: 2025-07-01

## 2025-06-17 RX ORDER — HYDROXYZINE HYDROCHLORIDE 25 MG/1
1 TABLET, FILM COATED ORAL
Qty: 60 | Refills: 0
Start: 2025-06-17 | End: 2025-07-01

## 2025-06-17 RX ADMIN — Medication 3 MILLIGRAM(S): at 20:10

## 2025-06-17 RX ADMIN — HYDROXYZINE HYDROCHLORIDE 50 MILLIGRAM(S): 25 TABLET, FILM COATED ORAL at 20:10

## 2025-06-17 RX ADMIN — ATORVASTATIN CALCIUM 20 MILLIGRAM(S): 80 TABLET, FILM COATED ORAL at 20:06

## 2025-06-17 RX ADMIN — LURASIDONE HYDROCHLORIDE 60 MILLIGRAM(S): 120 TABLET, FILM COATED ORAL at 20:06

## 2025-06-17 RX ADMIN — METFORMIN HYDROCHLORIDE 500 MILLIGRAM(S): 850 TABLET ORAL at 20:06

## 2025-06-17 RX ADMIN — METFORMIN HYDROCHLORIDE 500 MILLIGRAM(S): 850 TABLET ORAL at 08:04

## 2025-06-17 NOTE — BH INPATIENT PSYCHIATRY DISCHARGE NOTE - HPI (INCLUDE ILLNESS QUALITY, SEVERITY, DURATION, TIMING, CONTEXT, MODIFYING FACTORS, ASSOCIATED SIGNS AND SYMPTOMS)
Patient is a 54 year old male, , domiciled with wife and 2 children, on disability, with PMH of type II diabetes and hypercholesteremia, past psychiatric history of schizoaffective disorder, not in outpatient care (previously saw Dr. Kulkarni up until 10/2024, where he was taking Lamictal, Fluoxetine, Latuda, Clonazepam), 2 prior IPP stays in 2018 for paranoia, no prior SA/NSSIB, substance use of alcohol (monthly), admitted for SA attempt by overdose on Klonopin. Admitted to IPP under 9.27 for SA attempt and one month of worsening psychosis (paranoid delusions, irritability).    Upon approach, patient appears irritable, with thought content notable for paranoid delusions. Patient explains that he only has a diagnosis of depression, denying any history of "hallucinations." He said he has been stable, so wanted to go off his psychotropics but his outpatient psychiatrist was hesitant. He self-discontinued lamital, latuda, fluoxetine, and clonazepam in 10/2024. He reports doing well until May 5th to May 26th, where he was driving his wife to work, and noticed 14-15 events of "different cars, different people, some blinking their lights at me, other making a hand motion for hanging/ slitting throat/taking pictures/gun." He is adamant that "someone is out to get [him," mentioning people in his past that may be the culprit. He says that this all got too much for him and decided to take an entire old bottle of clonazepam. When asked if he wanted to die, he says "yeah, maybe. or just to relax." he frames this as an impulsive act that was "stupid." He refuses to hear about medications, reiterating that the person who is doing this to him should be put in a psychiatric mckeon, not him.     Spoke to wife, Pilar, #[phone]. She explains that she did not know patient stopped seeing his psychiatrist or taking his medications. She says he has history of Schizoaffective Disorder, 2 prior hospitalizations in 2018 for paranoia, was seeing Dr. Kulkarni and stablized on above mention med regimen for many years. 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 She says patient is very irritable, but not physically aggressive. She says patient also admitted to her that this was a suicide attempt.    Patient's wife, Pilar, showed provider two notes that the patient wrote to her. One was a yellow post-it notes that writes, "show this letter to Dr. Meza, Dr. Kulkarni, + Dr. Platt, for there may be a time I mentioned something similar that I just don't remember." The main note says, "6/6/25. Pilar, From the three plus weeks I drove you to work, which was from Monday May 5 to Monday May 25, I have had so many harassing things done to me while waiting for your session to end. I would have cars intentionally come near me as I sat in [facility] waiting for you as if they are going to hit my car. During this time I had cars, multiple times, drive by my car and as they drove by pretending to take a picture of me, pointing their index finger at me as if they are going to fire a gun at me, making a hanging sign as if they are going to hang me, and a cut throat sign as if they are going to cut my neck. I don't hallucinate so this is not paranoia. I've also had car flashing their lights at me as I drive and they drive on the other side of the road. I don't know who is behind these harassing tactics, which is about 15 times in total but I am having problems with these threat as parsimony doesn't apply here. I am having problems and I am now consumed with fear for someone is sending me a message. Multiple cars with drivers I don't recognize. This is not paranoia as I don't hallucinate. I cant handle and you were correct in noticing a change in me these last few weeks. I love you."

## 2025-06-17 NOTE — BH INPATIENT PSYCHIATRY DISCHARGE NOTE - NSBHMSETHTCONTENT_PSY_A_CORE
Perseverative on paranoid ideations, not open to reality testing/Delusions Perseverative on paranoid ideations, not open to reality testing/Unremarkable

## 2025-06-17 NOTE — BH INPATIENT PSYCHIATRY DISCHARGE NOTE - NSBHFUPINTERVALCCFT_PSY_A_CORE
"I'm disappointed that I'm not going home today but I'm okay." "I'm doing well. My mood is good. Happy to be going home today. My wife picked up all of my medications yesterday."

## 2025-06-17 NOTE — BH INPATIENT PSYCHIATRY DISCHARGE NOTE - REASON FOR ADMISSION
You (Wilder Bentley) were admitted with a one month history of paranoid thoughts which became overwhelming to the point that you tried to kill yourself by overdosing on your pills (Klonopin). This was in the context of you stopping all of your medications for your schizoaffective disorder and stopping seeing your outpatient psychiatrist in October 2024. You were admitted under 9.27 legal status due to the severity of your suicide attempt and acute paranoia for safety, stabilization, and medication management. You were admitted with a one month history of paranoid thoughts which became overwhelming to the point that you tried to kill yourself by overdosing on your pills (Klonopin). This was in the context of you stopping all of your medications for your schizoaffective disorder and stopping seeing your outpatient psychiatrist in October 2024. You were admitted under 9.27 legal status due to the severity of your suicide attempt and acute paranoia for safety, stabilization, and medication management.

## 2025-06-17 NOTE — BH INPATIENT PSYCHIATRY PROGRESS NOTE - NSBHFUPINTERVALHXFT_PSY_A_CORE
Chart reviewed and patient seen.    Patient is found in milieu and agrees to return to room for interview. Patient is alert and cooperative and engages with interview. He continues to do well, again reporting good mood, no issues with eating/sleeping, and denies SI/HI/AVH. We discuss that patient's discharge is planned for tomorrow - patient states, "I'm disappointed that I'm not going home today but I'm okay." Continues to tolerate increased dose of Latuda from 40 to 60mg with no overt side effects noted. Chart reviewed and patient seen.    Patient is found in milieu and agrees to return to room for interview. Patient is alert and cooperative and engages with interview. He continues to do well, again reporting good mood, no issues with eating/sleeping, and denies SI/HI/AVH, paranoia. We discuss that patient's discharge is planned for tomorrow - patient states, "I'm disappointed that I'm not going home today but I'm okay." Continues to tolerate increased dose of Latuda from 40 to 60mg with no overt side effects noted.

## 2025-06-17 NOTE — BH INPATIENT PSYCHIATRY PROGRESS NOTE - CURRENT MEDICATION
MEDICATIONS  (STANDING):  atorvastatin 20 milliGRAM(s) Oral at bedtime  lurasidone 60 milliGRAM(s) Oral at bedtime  metFORMIN 500 milliGRAM(s) Oral two times a day    MEDICATIONS  (PRN):  acetaminophen     Tablet .. 650 milliGRAM(s) Oral every 6 hours PRN Temp greater or equal to 38C (100.4F), Mild Pain (1 - 3), Moderate Pain (4 - 6)  aluminum hydroxide/magnesium hydroxide/simethicone Suspension 30 milliLiter(s) Oral every 6 hours PRN Dyspepsia  dextrose Oral Gel 15 Gram(s) Oral once PRN Blood Glucose LESS THAN 70 milliGRAM(s)/deciliter  diphenhydrAMINE 50 milliGRAM(s) Oral every 6 hours PRN Extrapyramidal prophylaxis  guaiFENesin Oral Liquid (Sugar-Free) 100 milliGRAM(s) Oral every 6 hours PRN cough  haloperidol     Tablet 5 milliGRAM(s) Oral every 6 hours PRN agitation  hydrOXYzine hydrochloride 50 milliGRAM(s) Oral every 6 hours PRN anxiety  melatonin. 3 milliGRAM(s) Oral at bedtime PRN Insomnia  senna 2 Tablet(s) Oral at bedtime PRN Constipation

## 2025-06-17 NOTE — BH INPATIENT PSYCHIATRY DISCHARGE NOTE - NSDCMRMEDTOKEN_GEN_ALL_CORE_FT
hydrOXYzine hydrochloride 50 mg oral tablet: 1 tab(s) orally every 6 hours as needed for anxiety  lurasidone 60 mg oral tablet: 1 tab(s) orally once a day (at bedtime)  metFORMIN 1000 mg oral tablet: 0.5 tab(s) orally 2 times a day  simvastatin 40 mg oral tablet: 1 orally

## 2025-06-17 NOTE — BH INPATIENT PSYCHIATRY PROGRESS NOTE - NSBHATTESTBILLING_PSY_A_CORE
73421-Cfrtvxouqi OBS or IP - moderate complexity OR 35-49 mins
65398-Fghxneujoc OBS or IP - low complexity OR 25-34 mins
03875-Aabaidhrki OBS or IP - low complexity OR 25-34 mins
47520-Yyzvcmlqws OBS or IP - moderate complexity OR 35-49 mins
83078-Qzbnrbvizo OBS or IP - moderate complexity OR 35-49 mins
24469-Scgdjdhibj OBS or IP - low complexity OR 25-34 mins

## 2025-06-17 NOTE — BH INPATIENT PSYCHIATRY DISCHARGE NOTE - NSBHDCHANDOFF_PSY_ALL_CORE
Patient evaluated when came to ICU and then with Dr. Cardoso at bedside. Pain free on NG gtt.   NSTEMI on heparin and NG gtt.  Plan for [facility] today, consent obtained.    Noman Vega MD  Cardiovascular Disease Fellow/ PGY VI    Continued discomfort  KRYSTYNA Risk score 7  Uncontrolled BP  Uptitrate nitro  Beta Blockade    FLORIDA FULTON, MD  [phone]         Yes...

## 2025-06-17 NOTE — BH INPATIENT PSYCHIATRY DISCHARGE NOTE - ATTENDING DISCHARGE PHYSICAL EXAMINATION:
I directly observed the history and MSE performed by the resident. I reviewed the note and edited where appropriate. I agree with the assessment and plan as written. Patient is euthymic and full range of affect. No objective signs/symptoms of psychosis, patient denying paranoia, ideas of reference, thought insertion/withdrawal, AVH. He continues to deny passive or active SI. He has been in good behavioral control and has not had any unsafe behaviors. He is appropriate for discharge at this time.

## 2025-06-17 NOTE — BH INPATIENT PSYCHIATRY DISCHARGE NOTE - NSBHMETABOLIC_PSY_ALL_CORE_FT
BMI: BMI (kg/m2): 32.3 (06-08-25 @ 13:42)  HbA1c: A1C with Estimated Average Glucose Result: 6.6 % (06-07-25 @ 05:37)    Glucose: POCT Blood Glucose.: 127 mg/dL (06-08-25 @ 19:38)    BP: 127/84 (06-16-25 @ 08:36) (116/82 - 142/82)Vital Signs Last 24 Hrs  T(C): 36.6 (06-16-25 @ 05:35), Max: 37 (06-15-25 @ 15:56)  T(F): 97.8 (06-16-25 @ 05:35), Max: 98.6 (06-15-25 @ 15:56)  HR: 79 (06-16-25 @ 08:36) (79 - 89)  BP: 127/84 (06-16-25 @ 08:36) (116/82 - 131/81)  BP(mean): --  RR: --  SpO2: --      Lipid Panel:

## 2025-06-17 NOTE — BH INPATIENT PSYCHIATRY DISCHARGE NOTE - HOSPITAL COURSE
Patient is a 54 year old male, , domiciled with wife and 2 children, on disability, with PMH of type II diabetes and hypercholesteremia, past psychiatric history of schizoaffective disorder, not in outpatient care (previously saw Dr. Kulkarni up until 10/2024, where he was taking Lamictal, Fluoxetine, Latuda, Clonazepam), 2 prior IPP stays in 2018 for paranoia, no prior SA/NSSIB, substance use of alcohol (monthly), admitted for SA attempt by overdose on Klonopin. Admitted to IPP under 9.27 for SA attempt and one month of worsening psychosis (paranoid delusions, irritability).    Upon evaluation, patient appears irritable, with thought content notable for paranoid delusions. Patient is presenting with about one months duration of worsening psychosis, evident by paranoid delusions, suicide attempt by OD, poor sleep, increasing irritability, in the context of known schizoaffective disorder, treatment nonadherence (patient self-discontinued Lamictal, Fluoxetine, Latuda, Clonazepam in 10/2024 and stopped following with psychiatrist), and substance use (was intoxicated prior to SA). Patient would benefit from restarting Latuda, which he previously tolerated well, for his acute psychosis. However, patient has extremely poor insight his condition, leading him to refuse medication, so will likely need TOO order if he doesn't improve. Due to the severity of patient's SA and acute psychosis, patient requires continued IPP admission for safety, stabilization and medication management.     Overall, patient improved significantly during his hospital stay on IPP. He was reluctant to take medications at first but with the support of his wife/family he agreed to taking medications. He was started on Latuda 40mg PO daily which was increased to 60mg PO QHS without overt side effects noted. Over the course of his hospitalization patient held onto paranoid delusions progressively less firmly. Denied SI/HI/AVH for duration of hospital stay and mood remained good with no issues with eating/sleeping. Patient and family informed of discharge follow up, safety plan complete, all questions answered.    Patient's treatment plan in hospital:    Recommendations:  #Schizoaffective Disorder  - Increase Latuda from 40mg PO daily to 60mg PO QHS with food for his psychosis    #Diabetes  - Resume home medication of Metformin 500mg BID    #Hypercholesteremia  - Patient takes at home, but non-formulary  - Start Atorvastatin 20mg PO QHS while admitted    #Other PRNs  - Atarax 50mg PO q6h PRN for anxiety  - Melatonin 3mg PO QHS PRN for insomnia  - Senna 2 tablets PO QHS PRN for constipation  - Maalox 30mL PO q6h PRN for dyspepsia  - Tylenol 650mg PO q6h PRN for temp/pain  - Guaifenesin 100mg PO q6h PRN for cough  - For acute agitation not amenable to verbal redirection, can give Haldol 5mg PO q6h PRN, Benadryl 50mg PO q6h PRN, and Ativan 2mg PO q6h PRN with escalation to IM if patient is a danger to self/others. If IM antipsychotic is administered, please obtain EKG to check the QTC; if the QTC>500 please hold Haldol, as it can further prolong the QTC.     Patient is a 54 year old male, , domiciled with wife and 2 children, on disability, with PMH of type II diabetes and hypercholesteremia, past psychiatric history of schizoaffective disorder, not in outpatient care (previously saw Dr. Kulkarni up until 10/2024, where he was taking Lamictal, Fluoxetine, Latuda, Clonazepam), 2 prior IPP stays in 2018 for paranoia, no prior SA/NSSIB, substance use of alcohol (monthly), admitted for SA attempt by overdose on Klonopin. Admitted to IPP under 9.27 for SA attempt and one month of worsening psychosis (paranoid delusions, irritability).    Upon evaluation, patient appears irritable, with thought content notable for paranoid delusions. Patient is presenting with about one months duration of worsening psychosis, evident by paranoid delusions, suicide attempt by OD, poor sleep, increasing irritability, in the context of known schizoaffective disorder, treatment nonadherence (patient self-discontinued Lamictal, Fluoxetine, Latuda, Clonazepam in 10/2024 and stopped following with psychiatrist), and substance use (was intoxicated prior to SA). Patient would benefit from restarting Latuda, which he previously tolerated well, for his acute psychosis.     Overall, patient improved significantly during his hospital stay on IPP. He was reluctant to take medications at first but with the support of his wife/family he agreed to taking medications. He was started on Latuda 40mg PO daily which was increased to 60mg PO QHS without overt side effects noted. Over the course of his hospitalization patient held onto paranoid delusions progressively less firmly. Denied SI/HI/AVH for duration of hospital stay and mood remained good with no issues with eating/sleeping. Patient and family informed of discharge follow up, safety plan complete, all questions answered.    Patient's treatment plan in hospital:    Recommendations:  #Schizoaffective Disorder  - Latuda 60mg PO QHS with food for his psychosis    #Diabetes  - Resume home medication of Metformin 500mg BID    #Hypercholesteremia  - Patient takes at home, but non-formulary  - Start Atorvastatin 20mg PO QHS while admitted    #Other PRNs  - Atarax 50mg PO q6h PRN for anxiety  - Melatonin 3mg PO QHS PRN for insomnia  - Senna 2 tablets PO QHS PRN for constipation  - Maalox 30mL PO q6h PRN for dyspepsia  - Tylenol 650mg PO q6h PRN for temp/pain  - Guaifenesin 100mg PO q6h PRN for cough  - For acute agitation not amenable to verbal redirection, can give Haldol 5mg PO q6h PRN, Benadryl 50mg PO q6h PRN, and Ativan 2mg PO q6h PRN with escalation to IM if patient is a danger to self/others. If IM antipsychotic is administered, please obtain EKG to check the QTC; if the QTC>500 please hold Haldol, as it can further prolong the QTC.

## 2025-06-17 NOTE — BH INPATIENT PSYCHIATRY PROGRESS NOTE - NSBHCHARTREVIEWVS_PSY_A_CORE FT
Vital Signs Last 24 Hrs  T(C): 36.6 (06-16-25 @ 05:35), Max: 37 (06-15-25 @ 15:56)  T(F): 97.8 (06-16-25 @ 05:35), Max: 98.6 (06-15-25 @ 15:56)  HR: 79 (06-16-25 @ 08:36) (79 - 89)  BP: 127/84 (06-16-25 @ 08:36) (116/82 - 131/81)  BP(mean): --  RR: --  SpO2: --     Vital Signs Last 24 Hrs  T(C): 36.8 (06-18-25 @ 07:30), Max: 36.8 (06-18-25 @ 07:30)  T(F): 98.3 (06-18-25 @ 07:30), Max: 98.3 (06-18-25 @ 07:30)  HR: 88 (06-18-25 @ 07:30) (85 - 88)  BP: 115/66 (06-18-25 @ 07:30) (115/66 - 149/90)  BP(mean): --  RR: --  SpO2: --

## 2025-06-17 NOTE — BH INPATIENT PSYCHIATRY DISCHARGE NOTE - NSBHFUPINTERVALHXFT_PSY_A_CORE
Chart reviewed and patient seen.    Patient is found in milieu and agrees to return to room for interview. Patient is alert and cooperative and engages with interview. He continues to do well, again reporting good mood, no issues with eating/sleeping, and denies SI/HI/AVH. We discuss that patient's discharge is planned for tomorrow - patient states, "I'm disappointed that I'm not going home today but I'm okay." Continues to tolerate increased dose of Latuda from 40 to 60mg with no overt side effects noted. Chart reviewed and patient seen.    Patient is found in room, where interview is conducted. Patient is alert and cooperative and engages with interview. He continues to do well, again reporting good mood, no issues with eating/sleeping, and denies SI/HI/AVH. Continues to tolerate increased dose of Latuda from 40 to 60mg with no overt side effects noted. Patient informs psychiatry team that his wife picked up all of his medications yesterday. Patient will be discharged today - discharge paperwork reviewed, safety planning complete, and all questions answered.

## 2025-06-17 NOTE — BH INPATIENT PSYCHIATRY PROGRESS NOTE - NSBHMETABOLIC_PSY_ALL_CORE_FT
BMI: BMI (kg/m2): 32.3 (06-08-25 @ 13:42)  HbA1c: A1C with Estimated Average Glucose Result: 6.6 % (06-07-25 @ 05:37)    Glucose: POCT Blood Glucose.: 127 mg/dL (06-08-25 @ 19:38)    BP: 127/84 (06-16-25 @ 08:36) (116/82 - 142/82)Vital Signs Last 24 Hrs  T(C): 36.6 (06-16-25 @ 05:35), Max: 37 (06-15-25 @ 15:56)  T(F): 97.8 (06-16-25 @ 05:35), Max: 98.6 (06-15-25 @ 15:56)  HR: 79 (06-16-25 @ 08:36) (79 - 89)  BP: 127/84 (06-16-25 @ 08:36) (116/82 - 131/81)  BP(mean): --  RR: --  SpO2: --      Lipid Panel:  BMI: BMI (kg/m2): 32.3 (06-08-25 @ 13:42)  HbA1c: A1C with Estimated Average Glucose Result: 6.6 % (06-07-25 @ 05:37)    Glucose: POCT Blood Glucose.: 127 mg/dL (06-08-25 @ 19:38)    BP: 115/66 (06-18-25 @ 07:30) (115/66 - 149/90)Vital Signs Last 24 Hrs  T(C): 36.8 (06-18-25 @ 07:30), Max: 36.8 (06-18-25 @ 07:30)  T(F): 98.3 (06-18-25 @ 07:30), Max: 98.3 (06-18-25 @ 07:30)  HR: 88 (06-18-25 @ 07:30) (85 - 88)  BP: 115/66 (06-18-25 @ 07:30) (115/66 - 149/90)  BP(mean): --  RR: --  SpO2: --      Lipid Panel:

## 2025-06-17 NOTE — BH INPATIENT PSYCHIATRY PROGRESS NOTE - PRN MEDS
MEDICATIONS  (PRN):  acetaminophen     Tablet .. 650 milliGRAM(s) Oral every 6 hours PRN Temp greater or equal to 38C (100.4F), Mild Pain (1 - 3), Moderate Pain (4 - 6)  aluminum hydroxide/magnesium hydroxide/simethicone Suspension 30 milliLiter(s) Oral every 6 hours PRN Dyspepsia  dextrose Oral Gel 15 Gram(s) Oral once PRN Blood Glucose LESS THAN 70 milliGRAM(s)/deciliter  diphenhydrAMINE 50 milliGRAM(s) Oral every 6 hours PRN Extrapyramidal prophylaxis  guaiFENesin Oral Liquid (Sugar-Free) 100 milliGRAM(s) Oral every 6 hours PRN cough  haloperidol     Tablet 5 milliGRAM(s) Oral every 6 hours PRN agitation  hydrOXYzine hydrochloride 50 milliGRAM(s) Oral every 6 hours PRN anxiety  melatonin. 3 milliGRAM(s) Oral at bedtime PRN Insomnia  senna 2 Tablet(s) Oral at bedtime PRN Constipation

## 2025-06-17 NOTE — BH INPATIENT PSYCHIATRY PROGRESS NOTE - NSBHASSESSSUMMFT_PSY_ALL_CORE
Patient is a 54 year old male, , domiciled with wife and 2 children, on disability, with PMH of type II diabetes and hypercholesteremia, past psychiatric history of schizoaffective disorder, not in outpatient care (previously saw Dr. Kulkarni up until 10/2024, where he was taking Lamictal, Fluoxetine, Latuda, Clonazepam), 2 prior IPP stays in 2018 for paranoia, no prior SA/NSSIB, substance use of alcohol (monthly), admitted for SA attempt by overdose on Klonopin. Admitted to IPP under 9.27 for SA attempt and one month of worsening psychosis (paranoid delusions, irritability).    Upon evaluation, patient appears irritable, with thought content notable for paranoid delusions. Patient is presenting with about one months duration of worsening psychosis, evident by paranoid delusions, suicide attempt by OD, poor sleep, increasing irritability, in the context of known schizoaffective disorder, treatment nonadherence (patient self-discontinued Lamictal, Fluoxetine, Latuda, Clonazepam in 10/2024 and stopped following with psychiatrist), and substance use (was intoxicated prior to SA). Patient would benefit from restarting Latuda, which he previously tolerated well, for his acute psychosis. However, patient has extremely poor insight his condition, leading him to refuse medication, so will likely need TOO order if he doesn't improve. Due to the severity of patient's SA and acute psychosis, patient requires continued IPP admission for safety, stabilization and medication management.     6/17: Continues to do well. Continues to agree to taking medications. Denies SI/HI/AVH. Continues to tolerate increased dose of Latuda from 40mg PO daily to 60mg PO QHS without overt side effects noted. Discharge planned for tomorrow.    Recommendations:  #Schizoaffective Disorder  - Increase Latuda from 40mg PO daily to 60mg PO QHS with food for his psychosis    #Diabetes  - Resume home medication of Metformin 500mg BID    #Hypercholesteremia  - Patient takes at home, but non-formulary  - Start Atorvastatin 20mg PO QHS while admitted    #Other PRNs  - Atarax 50mg PO q6h PRN for anxiety  - Melatonin 3mg PO QHS PRN for insomnia  - Senna 2 tablets PO QHS PRN for constipation  - Maalox 30mL PO q6h PRN for dyspepsia  - Tylenol 650mg PO q6h PRN for temp/pain  - Guaifenesin 100mg PO q6h PRN for cough  - For acute agitation not amenable to verbal redirection, can give Haldol 5mg PO q6h PRN, Benadryl 50mg PO q6h PRN, and Ativan 2mg PO q6h PRN with escalation to IM if patient is a danger to self/others. If IM antipsychotic is administered, please obtain EKG to check the QTC; if the QTC>500 please hold Haldol, as it can further prolong the QTC.   Patient is a 54 year old male, , domiciled with wife and 2 children, on disability, with PMH of type II diabetes and hypercholesteremia, past psychiatric history of schizoaffective disorder, not in outpatient care (previously saw Dr. Kulkarni up until 10/2024, where he was taking Lamictal, Fluoxetine, Latuda, Clonazepam), 2 prior IPP stays in 2018 for paranoia, no prior SA/NSSIB, substance use of alcohol (monthly), admitted for SA attempt by overdose on Klonopin. Admitted to IPP under 9.27 for SA attempt and one month of worsening psychosis (paranoid delusions, irritability).    Upon evaluation, patient appears irritable, with thought content notable for paranoid delusions. Patient is presenting with about one months duration of worsening psychosis, evident by paranoid delusions, suicide attempt by OD, poor sleep, increasing irritability, in the context of known schizoaffective disorder, treatment nonadherence (patient self-discontinued Lamictal, Fluoxetine, Latuda, Clonazepam in 10/2024 and stopped following with psychiatrist), and substance use (was intoxicated prior to SA). Patient would benefit from restarting Latuda, which he previously tolerated well, for his acute psychosis. Due to the severity of patient's SA and acute psychosis, patient requires continued IPP admission for safety, stabilization and medication management. He has had gradual improvement in paranoia with resumption of lurasidone.     6/17: Continues to do well. Continues to agree to taking medications. Denies SI/HI/AVH. Continues to tolerate increased dose of Latuda from 40mg PO daily to 60mg PO QHS without overt side effects noted. Discharge planned for tomorrow.    Recommendations:  #Schizoaffective Disorder  - Increase Latuda from 40mg PO daily to 60mg PO QHS with food for his psychosis    #Diabetes  - Resume home medication of Metformin 500mg BID    #Hypercholesteremia  - Patient takes at home, but non-formulary  - Start Atorvastatin 20mg PO QHS while admitted    #Other PRNs  - Atarax 50mg PO q6h PRN for anxiety  - Melatonin 3mg PO QHS PRN for insomnia  - Senna 2 tablets PO QHS PRN for constipation  - Maalox 30mL PO q6h PRN for dyspepsia  - Tylenol 650mg PO q6h PRN for temp/pain  - Guaifenesin 100mg PO q6h PRN for cough  - For acute agitation not amenable to verbal redirection, can give Haldol 5mg PO q6h PRN, Benadryl 50mg PO q6h PRN, and Ativan 2mg PO q6h PRN with escalation to IM if patient is a danger to self/others. If IM antipsychotic is administered, please obtain EKG to check the QTC; if the QTC>500 please hold Haldol, as it can further prolong the QTC.

## 2025-06-17 NOTE — BH INPATIENT PSYCHIATRY DISCHARGE NOTE - DESCRIPTION
Previously worked at [employer], on disability for the past few years due to mental health condition

## 2025-06-17 NOTE — BH INPATIENT PSYCHIATRY DISCHARGE NOTE - NSBHASSESSSUMMFT_PSY_ALL_CORE
Patient is a 54 year old male, , domiciled with wife and 2 children, on disability, with PMH of type II diabetes and hypercholesteremia, past psychiatric history of schizoaffective disorder, not in outpatient care (previously saw Dr. Kulkarni up until 10/2024, where he was taking Lamictal, Fluoxetine, Latuda, Clonazepam), 2 prior IPP stays in 2018 for paranoia, no prior SA/NSSIB, substance use of alcohol (monthly), admitted for SA attempt by overdose on Klonopin. Admitted to IPP under 9.27 for SA attempt and one month of worsening psychosis (paranoid delusions, irritability).    Upon evaluation, patient appears irritable, with thought content notable for paranoid delusions. Patient is presenting with about one months duration of worsening psychosis, evident by paranoid delusions, suicide attempt by OD, poor sleep, increasing irritability, in the context of known schizoaffective disorder, treatment nonadherence (patient self-discontinued Lamictal, Fluoxetine, Latuda, Clonazepam in 10/2024 and stopped following with psychiatrist), and substance use (was intoxicated prior to SA). Patient would benefit from restarting Latuda, which he previously tolerated well, for his acute psychosis. However, patient has extremely poor insight his condition, leading him to refuse medication, so will likely need TOO order if he doesn't improve. Due to the severity of patient's SA and acute psychosis, patient requires continued IPP admission for safety, stabilization and medication management.     6/17: Continues to do well. Continues to agree to taking medications. Denies SI/HI/AVH. Continues to tolerate increased dose of Latuda from 40mg PO daily to 60mg PO QHS without overt side effects noted. Discharge planned for tomorrow.    Recommendations:  #Schizoaffective Disorder  - Increase Latuda from 40mg PO daily to 60mg PO QHS with food for his psychosis    #Diabetes  - Resume home medication of Metformin 500mg BID    #Hypercholesteremia  - Patient takes at home, but non-formulary  - Start Atorvastatin 20mg PO QHS while admitted    #Other PRNs  - Atarax 50mg PO q6h PRN for anxiety  - Melatonin 3mg PO QHS PRN for insomnia  - Senna 2 tablets PO QHS PRN for constipation  - Maalox 30mL PO q6h PRN for dyspepsia  - Tylenol 650mg PO q6h PRN for temp/pain  - Guaifenesin 100mg PO q6h PRN for cough  - For acute agitation not amenable to verbal redirection, can give Haldol 5mg PO q6h PRN, Benadryl 50mg PO q6h PRN, and Ativan 2mg PO q6h PRN with escalation to IM if patient is a danger to self/others. If IM antipsychotic is administered, please obtain EKG to check the QTC; if the QTC>500 please hold Haldol, as it can further prolong the QTC.   Patient is a 54 year old male, , domiciled with wife and 2 children, on disability, with PMH of type II diabetes and hypercholesteremia, past psychiatric history of schizoaffective disorder, not in outpatient care (previously saw Dr. Kulkarni up until 10/2024, where he was taking Lamictal, Fluoxetine, Latuda, Clonazepam), 2 prior IPP stays in 2018 for paranoia, no prior SA/NSSIB, substance use of alcohol (monthly), admitted for SA attempt by overdose on Klonopin. Admitted to IPP under 9.27 for SA attempt and one month of worsening psychosis (paranoid delusions, irritability).    Upon evaluation, patient appears irritable, with thought content notable for paranoid delusions. Patient is presenting with about one months duration of worsening psychosis, evident by paranoid delusions, suicide attempt by OD, poor sleep, increasing irritability, in the context of known schizoaffective disorder, treatment nonadherence (patient self-discontinued Lamictal, Fluoxetine, Latuda, Clonazepam in 10/2024 and stopped following with psychiatrist), and substance use (was intoxicated prior to SA). Patient would benefit from restarting Latuda, which he previously tolerated well, for his acute psychosis. However, patient has extremely poor insight his condition, leading him to refuse medication, so will likely need TOO order if he doesn't improve. Due to the severity of patient's SA and acute psychosis, patient requires continued IPP admission for safety, stabilization and medication management.  Patient is a 54 year old male, , domiciled with wife and 2 children, on disability, with PMH of type II diabetes and hypercholesteremia, past psychiatric history of schizoaffective disorder, not in outpatient care (previously saw Dr. Kulkarni up until 10/2024, where he was taking Lamictal, Fluoxetine, Latuda, Clonazepam), 2 prior IPP stays in 2018 for paranoia, no prior SA/NSSIB, substance use of alcohol (monthly), admitted for SA attempt by overdose on Klonopin. Admitted to IP under 9.27 for SA attempt and one month of worsening psychosis (paranoid delusions, irritability).    Upon evaluation, patient appears irritable, with thought content notable for paranoid delusions. Patient is presenting with about one months duration of worsening psychosis, evident by paranoid delusions, suicide attempt by OD, poor sleep, increasing irritability, in the context of known schizoaffective disorder, treatment nonadherence (patient self-discontinued Lamictal, Fluoxetine, Latuda, Clonazepam in 10/2024 and stopped following with psychiatrist), and substance use (was intoxicated prior to SA). Patient would benefit from restarting Latuda, which he previously tolerated well, for his acute psychosis. Patient agreed to resume lurasidone, which he tolerated well. He had gradual improvement in paranoia. No suicidal ideation during hospitalization. He is appropriate for discharge at this time.

## 2025-06-18 VITALS — SYSTOLIC BLOOD PRESSURE: 115 MMHG | DIASTOLIC BLOOD PRESSURE: 66 MMHG | TEMPERATURE: 98 F | HEART RATE: 88 BPM

## 2025-06-18 PROCEDURE — 99238 HOSP IP/OBS DSCHRG MGMT 30/<: CPT

## 2025-06-18 RX ADMIN — METFORMIN HYDROCHLORIDE 500 MILLIGRAM(S): 850 TABLET ORAL at 08:16

## 2025-06-18 RX ADMIN — HYDROXYZINE HYDROCHLORIDE 50 MILLIGRAM(S): 25 TABLET, FILM COATED ORAL at 05:22

## 2025-06-18 NOTE — BH SCALES AND SCREENS - NSAIMSDENTUREWORN_PSY_ALL_CORE
Dehydration: Care Instructions  Your Care Instructions  Dehydration happens when your body loses too much fluid. This might happen when you do not drink enough water or you lose large amounts of fluids from your body because of diarrhea, vomiting, or sweating. Severe dehydration can be life-threatening. Water and minerals called electrolytes help put your body fluids back in balance. Learn the early signs of fluid loss, and drink more fluids to prevent dehydration. Follow-up care is a key part of your treatment and safety. Be sure to make and go to all appointments, and call your doctor if you are having problems. It's also a good idea to know your test results and keep a list of the medicines you take. How can you care for yourself at home? · To prevent dehydration, drink plenty of fluids, enough so that your urine is light yellow or clear like water. Choose water and other caffeine-free clear liquids until you feel better. If you have kidney, heart, or liver disease and have to limit fluids, talk with your doctor before you increase the amount of fluids you drink. · If you do not feel like eating or drinking, try taking small sips of water, sports drinks, or other rehydration drinks. · Get plenty of rest.  To prevent dehydration  · Add more fluids to your diet and daily routine, unless your doctor has told you not to. · During hot weather, drink more fluids. Drink even more fluids if you exercise a lot. Stay away from drinks with alcohol or caffeine. · Watch for the symptoms of dehydration. These include:  ¨ A dry, sticky mouth. ¨ Dark yellow urine, and not much of it. ¨ Dry and sunken eyes. ¨ Feeling very tired. · Learn what problems can lead to dehydration. These include:  ¨ Diarrhea, fever, and vomiting. ¨ Any illness with a fever, such as pneumonia or the flu. ¨ Activities that cause heavy sweating, such as endurance races and heavy outdoor work in hot or humid weather.   ¨ Alcohol or drug abuse or withdrawal.  ¨ Certain medicines, such as cold and allergy pills (antihistamines), diet pills (diuretics), and laxatives. ¨ Certain diseases, such as diabetes, cancer, and heart or kidney disease. When should you call for help? Call 911 anytime you think you may need emergency care. For example, call if:  · You passed out (lost consciousness). Call your doctor now or seek immediate medical care if:  · You are confused and cannot think clearly. · You are dizzy or lightheaded, or you feel like you may faint. · You have signs of needing more fluids. You have sunken eyes and a dry mouth, and you pass only a little dark urine. · You cannot keep fluids down. Watch closely for changes in your health, and be sure to contact your doctor if:  · You are not making tears. · Your skin is very dry and sags slowly back into place after you pinch it. · Your mouth and eyes are very dry. Where can you learn more? Go to http://elise-jaylen.info/. Enter T700 in the search box to learn more about \"Dehydration: Care Instructions. \"  Current as of: May 27, 2016  Content Version: 11.2  © 8897-9678 [employer]. Care instructions adapted under license by [employer] (which disclaims liability or warranty for this information). If you have questions about a medical condition or this instruction, always ask your healthcare professional. [place] [address] any warranty or liability for your use of this information. Diarrhea: Care Instructions  Your Care Instructions    Diarrhea is loose, watery stools (bowel movements). The exact cause is often hard to find. Sometimes diarrhea is your body's way of getting rid of what caused an upset stomach. Viruses, food poisoning, and many medicines can cause diarrhea. Some people get diarrhea in response to emotional stress, anxiety, or certain foods. Almost everyone has diarrhea now and then.  It usually isn't serious, and your stools will return to normal soon. The important thing to do is replace the fluids you have lost, so you can prevent dehydration. The doctor has checked you carefully, but problems can develop later. If you notice any problems or new symptoms, get medical treatment right away. Follow-up care is a key part of your treatment and safety. Be sure to make and go to all appointments, and call your doctor if you are having problems. It's also a good idea to know your test results and keep a list of the medicines you take. How can you care for yourself at home? · Watch for signs of dehydration, which means your body has lost too much water. Dehydration is a serious condition and should be treated right away. Signs of dehydration are:  ¨ Increasing thirst and dry eyes and mouth. ¨ Feeling faint or lightheaded. ¨ Darker urine, and a smaller amount of urine than normal.  · To prevent dehydration, drink plenty of fluids, enough so that your urine is light yellow or clear like water. Choose water and other caffeine-free clear liquids until you feel better. If you have kidney, heart, or liver disease and have to limit fluids, talk with your doctor before you increase the amount of fluids you drink. · Begin eating small amounts of mild foods the next day, if you feel like it. ¨ Try yogurt that has live cultures of Lactobacillus. (Check the label.)  ¨ Avoid spicy foods, fruits, alcohol, and caffeine until 48 hours after all symptoms are gone. ¨ Avoid chewing gum that contains sorbitol. ¨ Avoid dairy products (except for yogurt with Lactobacillus) while you have diarrhea and for 3 days after symptoms are gone. · The doctor may recommend that you take over-the-counter medicine, such as loperamide (Imodium), if you still have diarrhea after 6 hours. Read and follow all instructions on the label. Do not use this medicine if you have bloody diarrhea, a high fever, or other signs of serious illness.  Call your doctor if you think you are having a problem with your medicine. When should you call for help? Call 911 anytime you think you may need emergency care. For example, call if:  · You passed out (lost consciousness). · Your stools are maroon or very bloody. Call your doctor now or seek immediate medical care if:  · You are dizzy or lightheaded, or you feel like you may faint. · Your stools are black and look like tar, or they have streaks of blood. · You have new or worse belly pain. · You have symptoms of dehydration, such as:  ¨ Dry eyes and a dry mouth. ¨ Passing only a little dark urine. ¨ Feeling thirstier than usual.  · You have a new or higher fever. Watch closely for changes in your health, and be sure to contact your doctor if:  · Your diarrhea is getting worse. · You see pus in the diarrhea. · You are not getting better after 2 days (48 hours). Where can you learn more? Go to http://elise-jaylen.info/. Enter N172 in the search box to learn more about \"Diarrhea: Care Instructions. \"  Current as of: May 27, 2016  Content Version: 11.2  © 3851-7782 [employer]. Care instructions adapted under license by [employer] (which disclaims liability or warranty for this information). If you have questions about a medical condition or this instruction, always ask your healthcare professional. [place] [address] any warranty or liability for your use of this information. Lightheadedness or Faintness: Care Instructions  Your Care Instructions  Lightheadedness is a feeling that you are about to faint or \"pass out. \" You do not feel as if you or your surroundings are moving. It is different from vertigo, which is the feeling that you or things around you are spinning or tilting. Lightheadedness usually goes away or gets better when you lie down. If lightheadedness gets worse, it can lead to a fainting spell.   It is common to feel lightheaded from time to time. Lightheadedness usually is not caused by a serious problem. It often is caused by a short-lasting drop in blood pressure and blood flow to your head that occurs when you get up too quickly from a seated or lying position. Follow-up care is a key part of your treatment and safety. Be sure to make and go to all appointments, and call your doctor if you are having problems. It's also a good idea to know your test results and keep a list of the medicines you take. How can you care for yourself at home? · Lie down for 1 or 2 minutes when you feel lightheaded. After lying down, sit up slowly and remain sitting for 1 to 2 minutes before slowly standing up. · Avoid movements, positions, or activities that have made you lightheaded in the past.  · Get plenty of rest, especially if you have a cold or flu, which can cause lightheadedness. · Make sure you drink plenty of fluids, especially if you have a fever or have been sweating. · Do not drive or put yourself and others in danger while you feel lightheaded. When should you call for help? Call 911 anytime you think you may need emergency care. For example, call if:  · You have symptoms of a stroke. These may include:  ¨ Sudden numbness, tingling, weakness, or loss of movement in your face, arm, or leg, especially on only one side of your body. ¨ Sudden vision changes. ¨ Sudden trouble speaking. ¨ Sudden confusion or trouble understanding simple statements. ¨ Sudden problems with walking or balance. ¨ A sudden, severe headache that is different from past headaches. · You have symptoms of a heart attack. These may include:  ¨ Chest pain or pressure, or a strange feeling in the chest.  ¨ Sweating. ¨ Shortness of breath. ¨ Nausea or vomiting. ¨ Pain, pressure, or a strange feeling in the back, neck, jaw, or upper belly or in one or both shoulders or arms. ¨ Lightheadedness or sudden weakness. ¨ A fast or irregular heartbeat.   After you call 911, the  may tell you to chew 1 adult-strength or 2 to 4 low-dose aspirin. Wait for an ambulance. Do not try to drive yourself. Watch closely for changes in your health, and be sure to contact your doctor if:  · Your lightheadedness gets worse or does not get better with home care. Where can you learn more? Go to http://elise-jaylen.info/. Enter H864 in the search box to learn more about \"Lightheadedness or Faintness: Care Instructions. \"  Current as of: May 27, 2016  Content Version: 11.2  © 8169-2265 [employer]. Care instructions adapted under license by [employer] (which disclaims liability or warranty for this information). If you have questions about a medical condition or this instruction, always ask your healthcare professional. [address] any warranty or liability for your use of this information. Hypokalemia: Care Instructions  Your Care Instructions  Hypokalemia (say \"lc-pq-ony-SENG-nia-uh\") is a low level of potassium. The heart, muscles, kidneys, and nervous system all need potassium to work well. This problem has many different causes. Kidney problems, diet, and medicines like diuretics and laxatives can cause it. So can vomiting or diarrhea. In some cases, cancer is the cause. Your doctor may do tests to find the cause of your low potassium levels. You may need medicines to bring your potassium levels back to normal. You may also need regular blood tests to check your potassium. If you have very low potassium, you may need intravenous (IV) medicines. You also may need tests to check the electrical activity of your heart. Heart problems caused by low potassium levels can be very serious. Follow-up care is a key part of your treatment and safety. Be sure to make and go to all appointments, and call your doctor if you are having problems.  It's also a good idea to know your test results and keep a list of the medicines you take.  How can you care for yourself at home? · If your doctor recommends it, eat foods that have a lot of potassium. These include fresh fruits, juices, and vegetables. They also include nuts, beans, and milk. · Be safe with medicines. If your doctor prescribes medicines or potassium supplements, take them exactly as directed. Call your doctor if you have any problems with your medicines. · Get your potassium levels tested as often as your doctor tells you. When should you call for help? Call 911 anytime you think you may need emergency care. For example, call if:  · You feel like your heart is missing beats. Heart problems caused by low potassium can cause death. · You passed out (lost consciousness). · You have a seizure. Call your doctor now or seek immediate medical care if:  · You feel weak or unusually tired. · You have severe arm or leg cramps. · You have tingling or numbness. · You feel sick to your stomach, or you vomit. · You have belly cramps. · You feel bloated or constipated. · You have to urinate a lot. · You feel very thirsty most of the time. · You are dizzy or lightheaded, or you feel like you may faint. · You feel depressed, or you lose touch with reality. Watch closely for changes in your health, and be sure to contact your doctor if:  · You do not get better as expected. Where can you learn more? Go to http://elise-jaylen.info/. Enter G358 in the search box to learn more about \"Hypokalemia: Care Instructions. \"  Current as of: July 28, 2016  Content Version: 11.2  © 5697-5027 [employer]. Care instructions adapted under license by [employer] (which disclaims liability or warranty for this information). If you have questions about a medical condition or this instruction, always ask your healthcare professional. [address] any warranty or liability for your use of this information.          Urinary Tract Infections in Men: Care Instructions  Your Care Instructions    A urinary tract infection, or UTI, is a general term for an infection anywhere between the kidneys and the tip of the penis. UTIs can also be a result of a prostate problem. Most cause pain or burning when you urinate. Most UTIs are caused by bacteria and can be cured with antibiotics. It is important to complete your treatment so that the infection does not get worse. Follow-up care is a key part of your treatment and safety. Be sure to make and go to all appointments, and call your doctor if you are having problems. It's also a good idea to know your test results and keep a list of the medicines you take. How can you care for yourself at home? · Take your antibiotics as prescribed. Do not stop taking them just because you feel better. You need to take the full course of antibiotics. · Take your medicines exactly as prescribed. Your doctor may have prescribed a medicine, such as phenazopyridine (Pyridium), to help relieve pain when you urinate. This turns your urine orange. You may stop taking it when your symptoms get better. But be sure to take all of your antibiotics, which treat the infection. · Drink extra water for the next day or two. This will help make the urine less concentrated and help wash out the bacteria causing the infection. (If you have kidney, heart, or liver disease and have to limit your fluids, talk with your doctor before you increase your fluid intake.)  · Avoid drinks that are carbonated or have caffeine. They can irritate the bladder. · Urinate often. Try to empty your bladder each time. · To relieve pain, take a hot bath or lay a heating pad (set on low) over your lower belly or genital area. Never go to sleep with a heating pad in place. To help prevent UTIs  · Drink plenty of fluids, enough so that your urine is light yellow or clear like water.  If you have kidney, heart, or liver disease and have to limit fluids, talk with your doctor before you increase the amount of fluids you drink. · Urinate when you have the urge. Do not hold your urine for a long time. Urinate before you go to sleep. · Keep your penis clean. Catheter care  If you have a drainage tube (catheter) in place, the following steps will help you care for it. · Always wash your hands before and after touching your catheter. · Check the area around the urethra for inflammation or signs of infection. Signs of infection include irritated, swollen, red, or tender skin, or pus around the catheter. · Clean the area around the catheter with soap and water two times a day. Dry with a clean towel afterward. · Do not apply powder or lotion to the skin around the catheter. To empty the urine collection bag  · Wash your hands with soap and water. · Without touching the drain spout, remove the spout from its sleeve at the bottom of the collection bag. Open the valve on the spout. · Let the urine flow out of the bag and into the toilet or a container. Do not let the tubing or drain spout touch anything. · After you empty the bag, clean the end of the drain spout with tissue and water. Close the valve and put the drain spout back into its sleeve at the bottom of the collection bag. · Wash your hands with soap and water. When should you call for help? Call your doctor now or seek immediate medical care if:  · Symptoms such as a fever, chills, nausea, or vomiting get worse or happen for the first time. · You have new pain in your back just below your rib cage. This is called flank pain. · There is new blood or pus in your urine. · You are not able to take or keep down your antibiotics. Watch closely for changes in your health, and be sure to contact your doctor if:  · You are not getting better after taking an antibiotic for 2 days. · Your symptoms go away but then come back. Where can you learn more?   Go to http://elise-jaylen.info/. Enter L585 in the search box to learn more about \"Urinary Tract Infections in Men: Care Instructions. \"  Current as of: November 28, 2016  Content Version: 11.2  © 0688-6592 [employer]. Care instructions adapted under license by [employer] (which disclaims liability or warranty for this information). If you have questions about a medical condition or this instruction, always ask your healthcare professional. [address] any warranty or liability for your use of this information. No

## 2025-06-18 NOTE — BH INPATIENT PSYCHIATRY PROGRESS NOTE - NSDCCRITERIA_PSY_ALL_CORE
Patient is no longer acutely psychotic nor having suicidal ideations. Able to safety plan.

## 2025-06-18 NOTE — BH SCALES AND SCREENS - NSAIMSSEVERABNMOV_PSY_ALL_CORE
Pt remains in paper scrubs, resting in stretcher comfortably. No signs of distress noted. Sitter remains at bedside in direct visual contact, charting per protocol every 15 minutes. No equipment or belongings are in the patients room. Pt aware of plan of care. Will continue to monitor.      None

## 2025-06-18 NOTE — BH CHART NOTE - RISK ASSESSMENT
Patient is at an elevated chronic risk of suicide due to non-modifiable risk factors of recent SI/SA, hx of depression and schizoaffective disorder with paranoid delusions, age, and gender. Modifiable risk factors at the time included psychosis (now much improved) and acute lack of connection to care and stopping medications (patient now restarted on medications, agrees to continue them long-term, and is connected with outpatient follow up). Protective factors include family support, domiciled status, no past SA, no past NSSIB, engagement in treatment, future orientation, and lack of endorsement of passive or active SI upon discharge. Patient was started on medications for his schizoaffective disorder. He engaged in group and milieu therapy. Patient was in good behavioral control throughout hospitalization. With these interventions, SI resolved and did not recur. After care was arranged. His acute risk of suicide is low and he is appropriate to transition to a lower level of care.

## 2025-06-18 NOTE — BH INPATIENT PSYCHIATRY PROGRESS NOTE - NSTXDISORGINTERMD_PSY_ALL_CORE
Psychiatry to manage medications
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
## 2025-06-18 NOTE — BH CHART NOTE - CURRENT INTENT
Yes S/P     S/P cataract surgery  bilateral  S/P cholecystectomy    S/P drug eluting coronary stent placement  2016 RAINE x 1 to mLAD (99% stenosis)

## 2025-06-18 NOTE — BH INPATIENT PSYCHIATRY PROGRESS NOTE - NSBHMSETHTPROC_PSY_A_CORE
Perseverative/Impaired reasoning
Linear/Impaired reasoning
Perseverative/Impaired reasoning
Linear/Impaired reasoning
Perseverative/Impaired reasoning

## 2025-06-18 NOTE — BH INPATIENT PSYCHIATRY PROGRESS NOTE - NSBHMETABOLICLABS_PSY_ALL_CORE
All labs not within last 12 months, ordered
All labs not within last 12 months, ordered
Labs within last 12 months
All labs not within last 12 months, ordered

## 2025-06-18 NOTE — BH INPATIENT PSYCHIATRY PROGRESS NOTE - NSBHCHARTREVIEWVS_PSY_A_CORE FT
Vital Signs Last 24 Hrs  T(C): 36.6 (17 Jun 2025 16:13), Max: 36.6 (17 Jun 2025 16:13)  T(F): 97.9 (17 Jun 2025 16:13), Max: 97.9 (17 Jun 2025 16:13)  HR: 85 (17 Jun 2025 16:13) (85 - 85)  BP: 149/90 (17 Jun 2025 16:13) (149/90 - 149/90)  BP(mean): --  RR: --  SpO2: --     Vital Signs Last 24 Hrs  T(C): 36.8 (06-18-25 @ 07:30), Max: 36.8 (06-18-25 @ 07:30)  T(F): 98.3 (06-18-25 @ 07:30), Max: 98.3 (06-18-25 @ 07:30)  HR: 88 (06-18-25 @ 07:30) (85 - 88)  BP: 115/66 (06-18-25 @ 07:30) (115/66 - 149/90)  BP(mean): --  RR: --  SpO2: --

## 2025-06-18 NOTE — BH INPATIENT PSYCHIATRY PROGRESS NOTE - NSBHCONTPROVIDER_PSY_ALL_CORE
Yes...
Not applicable

## 2025-06-18 NOTE — BH INPATIENT PSYCHIATRY PROGRESS NOTE - NSBHFUPINTERVALHXFT_PSY_A_CORE
Chart reviewed and patient seen.    Patient is found in room, where interview is conducted. Patient is alert and cooperative and engages with interview. He continues to do well, again reporting good mood, no issues with eating/sleeping, and denies SI/HI/AVH. Continues to tolerate increased dose of Latuda from 40 to 60mg with no overt side effects noted. Patient informs psychiatry team that his wife picked up all of his medications yesterday. Patient will be discharged today - discharge paperwork reviewed, safety planning complete, and all questions answered.

## 2025-06-18 NOTE — BH INPATIENT PSYCHIATRY PROGRESS NOTE - NSBHFUPINTERVALCCFT_PSY_A_CORE
"I'm doing really well. I saw my wife again yesterday."
"I feel really good. Excellent."
"I'm doing really well. I'm okay with that." (WRT increasing Latuda dose)
"My mood is excellent."
"I don't hallucinate and what happened in May is real." 
"I'm doing well. My mood is good. Happy to be going home today. My wife picked up all of my medications yesterday."
"I'm disappointed that I'm not going home today but I'm okay."
"I decided to take the medications." (What made you change your mind?) "My wife - speaking with her."

## 2025-06-18 NOTE — BH INPATIENT PSYCHIATRY PROGRESS NOTE - CURRENT MEDICATION
MEDICATIONS  (STANDING):  atorvastatin 20 milliGRAM(s) Oral at bedtime  lurasidone 60 milliGRAM(s) Oral at bedtime  metFORMIN 500 milliGRAM(s) Oral two times a day     MEDICATIONS  (STANDING):  atorvastatin 20 milliGRAM(s) Oral at bedtime  lurasidone 60 milliGRAM(s) Oral at bedtime  metFORMIN 500 milliGRAM(s) Oral two times a day    MEDICATIONS  (PRN):  acetaminophen     Tablet .. 650 milliGRAM(s) Oral every 6 hours PRN Temp greater or equal to 38C (100.4F), Mild Pain (1 - 3), Moderate Pain (4 - 6)  aluminum hydroxide/magnesium hydroxide/simethicone Suspension 30 milliLiter(s) Oral every 6 hours PRN Dyspepsia  dextrose Oral Gel 15 Gram(s) Oral once PRN Blood Glucose LESS THAN 70 milliGRAM(s)/deciliter  diphenhydrAMINE 50 milliGRAM(s) Oral every 6 hours PRN Extrapyramidal prophylaxis  guaiFENesin Oral Liquid (Sugar-Free) 100 milliGRAM(s) Oral every 6 hours PRN cough  haloperidol     Tablet 5 milliGRAM(s) Oral every 6 hours PRN agitation  hydrOXYzine hydrochloride 50 milliGRAM(s) Oral every 6 hours PRN anxiety  melatonin. 3 milliGRAM(s) Oral at bedtime PRN Insomnia  senna 2 Tablet(s) Oral at bedtime PRN Constipation

## 2025-06-18 NOTE — BH INPATIENT PSYCHIATRY PROGRESS NOTE - NSTXSUICIDDATETRGT_PSY_ALL_CORE
10-Hari-2025

## 2025-06-18 NOTE — BH INPATIENT PSYCHIATRY PROGRESS NOTE - NSBHASSESSSUMMFT_PSY_ALL_CORE
Patient is a 54 year old male, , domiciled with wife and 2 children, on disability, with PMH of type II diabetes and hypercholesteremia, past psychiatric history of schizoaffective disorder, not in outpatient care (previously saw Dr. Kulkarni up until 10/2024, where he was taking Lamictal, Fluoxetine, Latuda, Clonazepam), 2 prior IPP stays in 2018 for paranoia, no prior SA/NSSIB, substance use of alcohol (monthly), admitted for SA attempt by overdose on Klonopin. Admitted to IPP under 9.27 for SA attempt and one month of worsening psychosis (paranoid delusions, irritability).    Upon evaluation, patient appears irritable, with thought content notable for paranoid delusions. Patient is presenting with about one months duration of worsening psychosis, evident by paranoid delusions, suicide attempt by OD, poor sleep, increasing irritability, in the context of known schizoaffective disorder, treatment nonadherence (patient self-discontinued Lamictal, Fluoxetine, Latuda, Clonazepam in 10/2024 and stopped following with psychiatrist), and substance use (was intoxicated prior to SA). Patient would benefit from restarting Latuda, which he previously tolerated well, for his acute psychosis. However, patient has extremely poor insight his condition, leading him to refuse medication, so will likely need TOO order if he doesn't improve. Due to the severity of patient's SA and acute psychosis, patient requires continued IPP admission for safety, stabilization and medication management.     6/18: Continues to do well. Continues to agree to taking medications. Denies SI/HI/AVH. Continues to tolerate increased dose of Latuda from 40mg PO daily to 60mg PO QHS without overt side effects noted. Patient ready for discharge today - discharge follow up reviewed, safety plan complete, all questions answered.    Recommendations:  #Schizoaffective Disorder  - Increase Latuda from 40mg PO daily to 60mg PO QHS with food for his psychosis    #Diabetes  - Resume home medication of Metformin 500mg BID    #Hypercholesteremia  - Patient takes at home, but non-formulary  - Start Atorvastatin 20mg PO QHS while admitted    #Other PRNs  - Atarax 50mg PO q6h PRN for anxiety  - Melatonin 3mg PO QHS PRN for insomnia  - Senna 2 tablets PO QHS PRN for constipation  - Maalox 30mL PO q6h PRN for dyspepsia  - Tylenol 650mg PO q6h PRN for temp/pain  - Guaifenesin 100mg PO q6h PRN for cough  - For acute agitation not amenable to verbal redirection, can give Haldol 5mg PO q6h PRN, Benadryl 50mg PO q6h PRN, and Ativan 2mg PO q6h PRN with escalation to IM if patient is a danger to self/others. If IM antipsychotic is administered, please obtain EKG to check the QTC; if the QTC>500 please hold Haldol, as it can further prolong the QTC.   Patient is a 54 year old male, , domiciled with wife and 2 children, on disability, with PMH of type II diabetes and hypercholesteremia, past psychiatric history of schizoaffective disorder, not in outpatient care (previously saw Dr. Kulkarni up until 10/2024, where he was taking Lamictal, Fluoxetine, Latuda, Clonazepam), 2 prior IPP stays in 2018 for paranoia, no prior SA/NSSIB, substance use of alcohol (monthly), admitted for SA attempt by overdose on Klonopin. Admitted to IPP under 9.27 for SA attempt and one month of worsening psychosis (paranoid delusions, irritability).    Upon evaluation, patient appears irritable, with thought content notable for paranoid delusions. Patient is presenting with about one months duration of worsening psychosis, evident by paranoid delusions, suicide attempt by OD, poor sleep, increasing irritability, in the context of known schizoaffective disorder, treatment nonadherence (patient self-discontinued Lamictal, Fluoxetine, Latuda, Clonazepam in 10/2024 and stopped following with psychiatrist), and substance use (was intoxicated prior to SA). Patient would benefit from restarting Latuda, which he previously tolerated well, for his acute psychosis. Patient demonstrated improvement in paranoia with resumption of lurasidone, no suicidal ideation while on IPP.     6/18: Continues to do well. Continues to agree to taking medications. Denies SI/HI/AVH. Continues to tolerate increased dose of Latuda from 40mg PO daily to 60mg PO QHS without overt side effects noted. Patient ready for discharge today - discharge follow up reviewed, safety plan complete, all questions answered.    Recommendations:  #Schizoaffective Disorder  - Latuda 60mg PO QHS with food for his psychosis    #Diabetes  - Resume home medication of Metformin 500mg BID    #Hypercholesteremia  - Patient takes at home, but non-formulary  - Start Atorvastatin 20mg PO QHS while admitted    #Other PRNs  - Atarax 50mg PO q6h PRN for anxiety  - Melatonin 3mg PO QHS PRN for insomnia  - Senna 2 tablets PO QHS PRN for constipation  - Maalox 30mL PO q6h PRN for dyspepsia  - Tylenol 650mg PO q6h PRN for temp/pain  - Guaifenesin 100mg PO q6h PRN for cough  - For acute agitation not amenable to verbal redirection, can give Haldol 5mg PO q6h PRN, Benadryl 50mg PO q6h PRN, and Ativan 2mg PO q6h PRN with escalation to IM if patient is a danger to self/others. If IM antipsychotic is administered, please obtain EKG to check the QTC; if the QTC>500 please hold Haldol, as it can further prolong the QTC.

## 2025-06-24 ENCOUNTER — APPOINTMENT (OUTPATIENT)
Dept: PSYCHIATRY | Facility: CLINIC | Age: 55
End: 2025-06-24

## 2025-06-25 DIAGNOSIS — E78.00 PURE HYPERCHOLESTEROLEMIA, UNSPECIFIED: ICD-10-CM
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
## 2025-06-25 DIAGNOSIS — E11.9 TYPE 2 DIABETES MELLITUS WITHOUT COMPLICATIONS: ICD-10-CM

## 2025-06-25 DIAGNOSIS — Z79.899 OTHER LONG TERM (CURRENT) DRUG THERAPY: ICD-10-CM

## 2025-06-25 DIAGNOSIS — Z79.84 LONG TERM (CURRENT) USE OF ORAL HYPOGLYCEMIC DRUGS: ICD-10-CM
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
## 2025-06-25 DIAGNOSIS — F25.9 SCHIZOAFFECTIVE DISORDER, UNSPECIFIED: ICD-10-CM

## 2025-06-26 ENCOUNTER — APPOINTMENT (OUTPATIENT)
Dept: PSYCHIATRY | Facility: CLINIC | Age: 55
End: 2025-06-26

## 2025-07-02 ENCOUNTER — APPOINTMENT (OUTPATIENT)
Dept: PSYCHIATRY | Facility: CLINIC | Age: 55
End: 2025-07-02